# Patient Record
Sex: FEMALE | Race: WHITE | NOT HISPANIC OR LATINO | Employment: FULL TIME
[De-identification: names, ages, dates, MRNs, and addresses within clinical notes are randomized per-mention and may not be internally consistent; named-entity substitution may affect disease eponyms.]

---

## 2017-05-27 ENCOUNTER — HEALTH MAINTENANCE LETTER (OUTPATIENT)
Age: 32
End: 2017-05-27

## 2017-12-29 ENCOUNTER — TRANSFERRED RECORDS (OUTPATIENT)
Dept: MULTI SPECIALTY CLINIC | Facility: CLINIC | Age: 32
End: 2017-12-29

## 2017-12-29 LAB
HPV ABSTRACT: NORMAL
PAP-ABSTRACT: NORMAL

## 2019-09-28 ENCOUNTER — HEALTH MAINTENANCE LETTER (OUTPATIENT)
Age: 34
End: 2019-09-28

## 2020-02-26 ENCOUNTER — OFFICE VISIT (OUTPATIENT)
Dept: FAMILY MEDICINE | Facility: CLINIC | Age: 35
End: 2020-02-26
Payer: COMMERCIAL

## 2020-02-26 VITALS
SYSTOLIC BLOOD PRESSURE: 130 MMHG | WEIGHT: 186 LBS | RESPIRATION RATE: 14 BRPM | OXYGEN SATURATION: 97 % | TEMPERATURE: 98.7 F | DIASTOLIC BLOOD PRESSURE: 78 MMHG | BODY MASS INDEX: 25.76 KG/M2

## 2020-02-26 DIAGNOSIS — B02.9 HERPES ZOSTER WITHOUT COMPLICATION: Primary | ICD-10-CM

## 2020-02-26 PROCEDURE — 99203 OFFICE O/P NEW LOW 30 MIN: CPT | Performed by: FAMILY MEDICINE

## 2020-02-26 NOTE — PROGRESS NOTES
"Subjective     Coty Doyle is a 35 year old female who presents to clinic today for the following health issues:    HPI   Shingles evaluation           {additonal problems for provider to add (Optional):797743}    {HIST REVIEW/ LINKS 2 (Optional):149500}    {Additional problems for the provider to add (optional):527950}  Reviewed and updated as needed this visit by Provider         Review of Systems   {ROS COMP (Optional):802406}      Objective    There were no vitals taken for this visit.  There is no height or weight on file to calculate BMI.  Physical Exam   {Exam List (Optional):214007}    {Diagnostic Test Results (Optional):588475::\"Diagnostic Test Results:\",\"Labs reviewed in Epic\"}        {PROVIDER CHARTING PREFERENCE:439903}      "

## 2020-02-26 NOTE — PROGRESS NOTES
Subjective:   Coty Doyle is a 35 year old female who presents for   Chief Complaint   Patient presents with     Shingles     Was in [place] when this started.  had a rash coincidentally but that was diagnosed as staph.   This started as a painful rash a week ago then a rash showed up the following day. Saw at an urgent care the day following on Thursday. Small raised bumps have since lowered. Some color changes.     Present on right side and does wrap to the back. Would like exam to ensure this has improved.   Has taken all her doses of the valtrex (TID x 7 days 1 gram)     She had chills at times but no current fevers.     She did have some fatigue for first couple days. Also associated ear ache that is mild.       Patient Active Problem List    Diagnosis Date Noted     Plantar warts 07/06/2012     Priority: Medium     Contraceptive management 04/18/2012     Priority: Medium     CARDIOVASCULAR SCREENING; LDL GOAL LESS THAN 160 04/17/2012     Priority: Medium       Current Outpatient Medications   Medication     calcium carb 1250 mg, 500 mg [place],/vitamin D 200 units (OSCAL WITH D) 500-200 MG-UNIT per tablet     Cyanocobalamin (B-12) 1000 MCG TBCR     fish oil-omega-3 fatty acids (FISH OIL) 1000 MG capsule     Miconazole Nitrate 2 % GEL     Multiple Vitamins-Iron (MULTIVITAMIN/IRON PO)     norgestim-eth estrad triphasic (ORTHO TRI-CYCLEN, 28,) 0.18/0.215/0.25 MG-35 MCG TABS tablet     polyethylene glycol (MIRALAX) powder     VITAMIN E     No current facility-administered medications for this visit.      ROS:  As above per HPI    Objective:   /78   Temp 98.7  F (37.1  C)   Resp 14   Wt 84.4 kg (186 lb)   SpO2 97%   BMI 25.76 kg/m  , Body mass index is 25.76 kg/m .  Gen:  NAD, well-nourished, sitting in chair comfortably  HEENT: EOMI, sclera anicteric, Head normocephalic, ; nares patent; moist mucous membranes, normal TMs  Neck: trachea midline, no thyromegaly  CV:  Hemodynamically  stable  Pulm:  no increased work of breathing   ABD: soft, non-distended  Extrem: no cyanosis, edema or clubbing  Skin: rash of the right flank posterior and anterior - affecting T11-T12 level  Psych: Euthymic, linear thoughts, normal rate of speech    No results found for any visits on 02/26/20.             Assessment & Plan:   Coty Doyle, 35 year old female who presents with:  Herpes zoster without complication  Patient was encouraged to take her medication as prescribed, has missed doses on many days. She has no new pain/swelling/drainage. Most of the lesions have resolved but a few lesions seen < 0.5x 0.75cm that are still slightly elevated but are beginning to deflate. No superimposed infection believed at this time - she has concerns as prior to this rash starting her significant other developed a staph infection. Close monitoring of symptoms - return as needed if worsening.       Barrie Meyers MD   [facility]    The use of Dragon/GreenBytes dictation services may have been used to construct the content in this note; any grammatical or spelling errors are non-intentional. Please contact the author of this note directly if you are in need of any clarification.

## 2020-03-23 ENCOUNTER — TELEPHONE (OUTPATIENT)
Dept: FAMILY MEDICINE | Facility: CLINIC | Age: 35
End: 2020-03-23

## 2020-03-23 ENCOUNTER — VIRTUAL VISIT (OUTPATIENT)
Dept: FAMILY MEDICINE | Facility: CLINIC | Age: 35
End: 2020-03-23
Payer: COMMERCIAL

## 2020-03-23 DIAGNOSIS — J45.20 MILD INTERMITTENT ASTHMA WITHOUT COMPLICATION: Primary | ICD-10-CM

## 2020-03-23 PROCEDURE — 99441 ZZC PHYSICIAN TELEPHONE EVALUATION 5-10 MIN: CPT | Performed by: PHYSICIAN ASSISTANT

## 2020-03-23 RX ORDER — ALBUTEROL SULFATE 90 UG/1
2 AEROSOL, METERED RESPIRATORY (INHALATION) EVERY 6 HOURS PRN
Qty: 1 INHALER | Refills: 4 | Status: SHIPPED | OUTPATIENT
Start: 2020-03-23 | End: 2021-12-31

## 2020-03-23 NOTE — PROGRESS NOTES
"Coty Doyle is a 35 year old female who is being evaluated via a billable telephone visit.      The patient has been notified of following:     \"This telephone visit will be conducted via a call between you and your physician/provider. We have found that certain health care needs can be provided without the need for a physical exam.  This service lets us provide the care you need with a short phone conversation.  If a prescription is necessary we can send it directly to your pharmacy.  If lab work is needed we can place an order for that and you can then stop by our lab to have the test done at a later time.    If during the course of the call the physician/provider feels a telephone visit is not appropriate, you will not be charged for this service.\"     Coty Doyle complains of    Chief Complaint   Patient presents with     Asthma       I have reviewed and updated the patient's Past Medical History, Social History, Family History and Medication List.    ALLERGIES  Sulfa drugs     35 year old female with a history of asthma  Current albuterol inhaler is   Would like refills today  Using sparingly, primarily needs when cold air triggers cough/wheeze    Assessment/Plan:  1. Mild intermittent asthma without complication  - Well controlled at present. Using albuterol sparingly. Primarily triggered by cold air.   - albuterol (PROAIR HFA/PROVENTIL HFA/VENTOLIN HFA) 108 (90 Base) MCG/ACT inhaler; Inhale 2 puffs into the lungs every 6 hours as needed for shortness of breath / dyspnea or wheezing  Dispense: 1 Inhaler; Refill: 4    Phone call duration:  7 minutes    Case Patrick PA-C      "

## 2020-07-15 ENCOUNTER — OFFICE VISIT (OUTPATIENT)
Dept: FAMILY MEDICINE | Facility: CLINIC | Age: 35
End: 2020-07-15
Payer: COMMERCIAL

## 2020-07-15 VITALS
HEART RATE: 115 BPM | BODY MASS INDEX: 25.62 KG/M2 | RESPIRATION RATE: 18 BRPM | SYSTOLIC BLOOD PRESSURE: 110 MMHG | DIASTOLIC BLOOD PRESSURE: 79 MMHG | OXYGEN SATURATION: 98 % | WEIGHT: 183 LBS | HEIGHT: 71 IN | TEMPERATURE: 97.6 F

## 2020-07-15 DIAGNOSIS — J45.20 MILD INTERMITTENT ASTHMA WITHOUT COMPLICATION: ICD-10-CM

## 2020-07-15 DIAGNOSIS — Z00.00 ROUTINE GENERAL MEDICAL EXAMINATION AT A HEALTH CARE FACILITY: Primary | ICD-10-CM

## 2020-07-15 DIAGNOSIS — Z11.59 SCREENING FOR VIRAL DISEASE: ICD-10-CM

## 2020-07-15 DIAGNOSIS — Z13.29 SCREENING FOR THYROID DISORDER: ICD-10-CM

## 2020-07-15 DIAGNOSIS — E61.1 LOW IRON: ICD-10-CM

## 2020-07-15 DIAGNOSIS — Z13.1 SCREENING FOR DIABETES MELLITUS: ICD-10-CM

## 2020-07-15 DIAGNOSIS — K90.49 DAIRY PRODUCT INTOLERANCE: ICD-10-CM

## 2020-07-15 DIAGNOSIS — Z13.220 SCREENING FOR HYPERLIPIDEMIA: ICD-10-CM

## 2020-07-15 LAB
BASOPHILS # BLD AUTO: 0 10E9/L (ref 0–0.2)
BASOPHILS NFR BLD AUTO: 0.6 %
CHOLEST SERPL-MCNC: 202 MG/DL
DIFFERENTIAL METHOD BLD: NORMAL
EOSINOPHIL # BLD AUTO: 0.3 10E9/L (ref 0–0.7)
EOSINOPHIL NFR BLD AUTO: 4.6 %
ERYTHROCYTE [DISTWIDTH] IN BLOOD BY AUTOMATED COUNT: 13.5 % (ref 10–15)
GLUCOSE SERPL-MCNC: 95 MG/DL (ref 70–99)
HCT VFR BLD AUTO: 40.8 % (ref 35–47)
HDLC SERPL-MCNC: 52 MG/DL
HGB BLD-MCNC: 13.4 G/DL (ref 11.7–15.7)
IRON SATN MFR SERPL: 20 % (ref 15–46)
IRON SERPL-MCNC: 54 UG/DL (ref 35–180)
LDLC SERPL CALC-MCNC: 137 MG/DL
LYMPHOCYTES # BLD AUTO: 1.3 10E9/L (ref 0.8–5.3)
LYMPHOCYTES NFR BLD AUTO: 18.7 %
MCH RBC QN AUTO: 29.5 PG (ref 26.5–33)
MCHC RBC AUTO-ENTMCNC: 32.8 G/DL (ref 31.5–36.5)
MCV RBC AUTO: 90 FL (ref 78–100)
MONOCYTES # BLD AUTO: 0.5 10E9/L (ref 0–1.3)
MONOCYTES NFR BLD AUTO: 6.9 %
NEUTROPHILS # BLD AUTO: 4.8 10E9/L (ref 1.6–8.3)
NEUTROPHILS NFR BLD AUTO: 69.2 %
NONHDLC SERPL-MCNC: 150 MG/DL
PLATELET # BLD AUTO: 213 10E9/L (ref 150–450)
RBC # BLD AUTO: 4.54 10E12/L (ref 3.8–5.2)
TIBC SERPL-MCNC: 264 UG/DL (ref 240–430)
TRIGL SERPL-MCNC: 64 MG/DL
TSH SERPL DL<=0.005 MIU/L-ACNC: 2.05 MU/L (ref 0.4–4)
WBC # BLD AUTO: 6.9 10E9/L (ref 4–11)

## 2020-07-15 PROCEDURE — 99000 SPECIMEN HANDLING OFFICE-LAB: CPT | Performed by: PHYSICIAN ASSISTANT

## 2020-07-15 PROCEDURE — 36415 COLL VENOUS BLD VENIPUNCTURE: CPT | Performed by: PHYSICIAN ASSISTANT

## 2020-07-15 PROCEDURE — 83550 IRON BINDING TEST: CPT | Performed by: PHYSICIAN ASSISTANT

## 2020-07-15 PROCEDURE — 84443 ASSAY THYROID STIM HORMONE: CPT | Performed by: PHYSICIAN ASSISTANT

## 2020-07-15 PROCEDURE — 85025 COMPLETE CBC W/AUTO DIFF WBC: CPT | Performed by: PHYSICIAN ASSISTANT

## 2020-07-15 PROCEDURE — 99214 OFFICE O/P EST MOD 30 MIN: CPT | Mod: 25 | Performed by: PHYSICIAN ASSISTANT

## 2020-07-15 PROCEDURE — 82947 ASSAY GLUCOSE BLOOD QUANT: CPT | Performed by: PHYSICIAN ASSISTANT

## 2020-07-15 PROCEDURE — 83540 ASSAY OF IRON: CPT | Performed by: PHYSICIAN ASSISTANT

## 2020-07-15 PROCEDURE — 86769 SARS-COV-2 COVID-19 ANTIBODY: CPT | Mod: 90 | Performed by: PHYSICIAN ASSISTANT

## 2020-07-15 PROCEDURE — 80061 LIPID PANEL: CPT | Performed by: PHYSICIAN ASSISTANT

## 2020-07-15 PROCEDURE — 99395 PREV VISIT EST AGE 18-39: CPT | Performed by: PHYSICIAN ASSISTANT

## 2020-07-15 ASSESSMENT — MIFFLIN-ST. JEOR: SCORE: 1622.24

## 2020-07-15 NOTE — PROGRESS NOTES
SUBJECTIVE:   CC: Coty Blankenship Yanira is an 35 year old woman who presents for preventive health visit.     Healthy Habits:    Do you get at least three servings of calcium containing foods daily (dairy, green leafy vegetables, etc.)? yes    Amount of exercise or daily activities, outside of work: waling every other day     Problems taking medications regularly No    Medication side effects: No    Have you had an eye exam in the past two years? yes    Do you see a dentist twice per year? Yes, next appt is delay     Do you have sleep apnea, excessive snoring or daytime drowsiness?yes, snoring     Additional: thyroid  Testing, and vaccine. Had shingles 2018 and have couple scars from it. Allergy testing      Here for routine health physical.   Overall feeling good about health.  Staying healthy during pandemic. She and  working from home.   Stress has been higher but trying to remedy with wellness activities.   She has a history of asthma that is triggered by seasonal allergies -- managed with PRN albuterol.   She would like a referral to allergist for food allergy evaluation     She had  are planning to try to conceive first child   No history of abnormal pap. Last pap done in [facility] 12/29/17 results normal.  Menstrual periods regular. Last cycle heavy which she attributes due to stress.  Mother had no problems with pregnancies. Aunts have had some fertility issues.   There is a family history of thyroid dysfunction.    Would like COVID-19 antibody testing.       Today's PHQ-2 Score:   PHQ-2 ( 1999 Pfizer) 7/15/2020 7/6/2012   Q1: Little interest or pleasure in doing things 0 0   Q2: Feeling down, depressed or hopeless 0 0   PHQ-2 Score 0 0     Abuse: Current or Past(Physical, Sexual or Emotional)- No  Do you feel safe in your environment? Yes    Social History     Tobacco Use     Smoking status: Never Smoker     Smokeless tobacco: Never Used   Substance Use Topics     Alcohol use: Yes      "Comment: rarely     If you drink alcohol do you typically have >3 drinks per day or >7 drinks per week? No                     Reviewed orders with patient.  Reviewed health maintenance and updated orders accordingly - Yes        Pertinent mammograms are reviewed under the imaging tab.  History of abnormal Pap smear: NO - age 30-65 PAP every 5 years with negative HPV co-testing recommended  PAP / HPV 4/17/2012   PAP NIL     Reviewed and updated as needed this visit by clinical staff  Tobacco  Allergies  Meds  Med Hx  Surg Hx  Fam Hx  Soc Hx        Reviewed and updated as needed this visit by Provider            ROS:  CONSTITUTIONAL: NEGATIVE for fever, chills, change in weight  INTEGUMENTARU/SKIN: NEGATIVE for worrisome rashes, moles or lesions  EYES: NEGATIVE for vision changes or irritation  ENT: NEGATIVE for ear, mouth and throat problems  RESP: NEGATIVE for significant cough or SOB  BREAST: NEGATIVE for masses, tenderness or discharge  CV: NEGATIVE for chest pain, palpitations or peripheral edema  GI: NEGATIVE for nausea, abdominal pain, heartburn, or change in bowel habits  : NEGATIVE for unusual urinary or vaginal symptoms. Periods are regular.  MUSCULOSKELETAL: NEGATIVE for significant arthralgias or myalgia  NEURO: NEGATIVE for weakness, dizziness or paresthesias  PSYCHIATRIC: NEGATIVE for changes in mood or affect    OBJECTIVE:   /79 (BP Location: Left arm, Patient Position: Sitting, Cuff Size: Adult Regular)   Pulse 115   Temp 97.6  F (36.4  C) (Oral)   Resp 18   Ht 1.797 m (5' 10.75\")   Wt 83 kg (183 lb)   LMP 07/01/2020 (Approximate)   SpO2 98%   BMI 25.70 kg/m    EXAM:  GENERAL: healthy, alert and no distress  EYES: Eyes grossly normal to inspection, PERRL and conjunctivae and sclerae normal  HENT: ear canals and TM's normal, nose and mouth without ulcers or lesions  NECK: no adenopathy, no asymmetry, masses, or scars and thyroid normal to palpation  RESP: lungs clear to " auscultation - no rales, rhonchi or wheezes  BREAST: normal without masses, tenderness or nipple discharge and no palpable axillary masses or adenopathy  CV: regular rate and rhythm, normal S1 S2, no S3 or S4, no murmur, click or rub, no peripheral edema and peripheral pulses strong  ABDOMEN: soft, nontender, no hepatosplenomegaly, no masses and bowel sounds normal  MS: no gross musculoskeletal defects noted, no edema  SKIN: no suspicious lesions or rashes  NEURO: Normal strength and tone, mentation intact and speech normal  PSYCH: mentation appears normal, affect normal/bright    Diagnostic Test Results:  Labs reviewed in Epic  Results for orders placed or performed in visit on 07/15/20 (from the past 24 hour(s))   CBC with platelets and differential   Result Value Ref Range    WBC 6.9 4.0 - 11.0 10e9/L    RBC Count 4.54 3.8 - 5.2 10e12/L    Hemoglobin 13.4 11.7 - 15.7 g/dL    Hematocrit 40.8 35.0 - 47.0 %    MCV 90 78 - 100 fl    MCH 29.5 26.5 - 33.0 pg    MCHC 32.8 31.5 - 36.5 g/dL    RDW 13.5 10.0 - 15.0 %    Platelet Count 213 150 - 450 10e9/L    Diff Method Automated Method     % Neutrophils 69.2 %    % Lymphocytes 18.7 %    % Monocytes 6.9 %    % Eosinophils 4.6 %    % Basophils 0.6 %    Absolute Neutrophil 4.8 1.6 - 8.3 10e9/L    Absolute Lymphocytes 1.3 0.8 - 5.3 10e9/L    Absolute Monocytes 0.5 0.0 - 1.3 10e9/L    Absolute Eosinophils 0.3 0.0 - 0.7 10e9/L    Absolute Basophils 0.0 0.0 - 0.2 10e9/L   Iron and iron binding capacity   Result Value Ref Range    Iron 54 35 - 180 ug/dL    Iron Binding Cap 264 240 - 430 ug/dL    Iron Saturation Index 20 15 - 46 %   Glucose   Result Value Ref Range    Glucose 95 70 - 99 mg/dL       ASSESSMENT/PLAN:   (Z00.00) Routine general medical examination at a health care facility  (primary encounter diagnosis)  Comment: routine health physical today. Overall doing well. Family planning in process.  Plan: Routine labs today. Continue to get regular exercise and practice  "stress reduction.     (J45.20) Mild intermittent asthma without complication  Comment: Chronic, stable. Triggered by seasonal allergies or extreme temperature changes. Managed currently with albuterol.   Plan: ALLERGY/ASTHMA ADULT REFERRAL          (K90.9) Dairy product intolerance  Comment: Patient would like allergy referral for possible dairy intolerance evaluation  Plan: ALLERGY/ASTHMA ADULT REFERRAL          (Z13.29) Screening for thyroid disorder  Comment: Family history of thyroid dysfunction.   Plan: TSH with free T4 reflex          (Z11.59) Screening for viral disease  Comment:   Plan: COVID-19 Virus (Coronavirus) Antibody & Titer         Reflex          (E61.1) Low iron  Comment: history of low iron, recheck today  Plan: CBC with platelets and differential, Iron and         iron binding capacity          (Z13.220) Screening for hyperlipidemia  Comment: Routine screening  Plan: Lipid panel reflex to direct LDL Fasting          (Z13.1) Screening for diabetes mellitus  Comment: Routine screening   Plan: Glucose            COUNSELING:   Reviewed preventive health counseling, as reflected in patient instructions       Regular exercise       Healthy diet/nutrition       Family planning    Estimated body mass index is 25.7 kg/m  as calculated from the following:    Height as of this encounter: 1.797 m (5' 10.75\").    Weight as of this encounter: 83 kg (183 lb).         reports that she has never smoked. She has never used smokeless tobacco.    Counseling Resources:  ATP IV Guidelines  Pooled Cohorts Equation Calculator  Breast Cancer Risk Calculator  FRAX Risk Assessment  ICSI Preventive Guidelines  Dietary Guidelines for Americans, 2010  USDA's MyPlate  ASA Prophylaxis  Lung CA Screening    Case Patrick PA-C  [facility]  "

## 2020-07-16 LAB
COVID-19 SPIKE RBD ABY TITER: NORMAL
COVID-19 SPIKE RBD ABY: NEGATIVE

## 2020-09-03 ENCOUNTER — E-VISIT (OUTPATIENT)
Dept: FAMILY MEDICINE | Facility: CLINIC | Age: 35
End: 2020-09-03
Payer: COMMERCIAL

## 2020-09-03 DIAGNOSIS — N39.0 ACUTE UTI (URINARY TRACT INFECTION): Primary | ICD-10-CM

## 2020-09-03 PROCEDURE — 99421 OL DIG E/M SVC 5-10 MIN: CPT | Performed by: FAMILY MEDICINE

## 2020-09-04 DIAGNOSIS — N39.0 ACUTE UTI (URINARY TRACT INFECTION): ICD-10-CM

## 2020-09-04 LAB
ALBUMIN UR-MCNC: NEGATIVE MG/DL
APPEARANCE UR: NORMAL
BACTERIA #/AREA URNS HPF: ABNORMAL /HPF
BILIRUB UR QL STRIP: NEGATIVE
COLOR UR AUTO: YELLOW
GLUCOSE UR STRIP-MCNC: NEGATIVE MG/DL
HGB UR QL STRIP: NEGATIVE
KETONES UR STRIP-MCNC: NEGATIVE MG/DL
LEUKOCYTE ESTERASE UR QL STRIP: NEGATIVE
NITRATE UR QL: NEGATIVE
NON-SQ EPI CELLS #/AREA URNS LPF: ABNORMAL /LPF
PH UR STRIP: 5.5 PH (ref 5–7)
RBC #/AREA URNS AUTO: ABNORMAL /HPF
SOURCE: NORMAL
SP GR UR STRIP: 1.02 (ref 1–1.03)
UROBILINOGEN UR STRIP-ACNC: 0.2 EU/DL (ref 0.2–1)
WBC #/AREA URNS AUTO: ABNORMAL /HPF

## 2020-09-04 PROCEDURE — 81001 URINALYSIS AUTO W/SCOPE: CPT | Performed by: FAMILY MEDICINE

## 2020-09-04 RX ORDER — NITROFURANTOIN 25; 75 MG/1; MG/1
100 CAPSULE ORAL 2 TIMES DAILY
Qty: 14 CAPSULE | Refills: 0 | Status: SHIPPED | OUTPATIENT
Start: 2020-09-04 | End: 2021-12-30

## 2020-09-04 NOTE — PATIENT INSTRUCTIONS
Thank you for choosing us for your care. I have placed an order for a prescription so that you can start treatment. View your full visit summary for details by clicking on the link below. Your pharmacist will able to address any questions you may have about the medication.     If you re not feeling better within 2-3 days, please schedule an appointment.  You can schedule an appointment right here in [employer], or call [phone]  If the visit is for the same symptoms as your e-visit, we ll refund the cost of your e-visit if seen within seven days.    Thank you for choosing us for your care. Given your symptoms, I would like you to do a lab-only visit to determine what is causing them.  I have placed the orders.  Please schedule an appointment with the lab right here in [employer], or call [phone].  I will let you know when the results are back and next steps to take.    Urinary Tract Infections in Women    Urinary tract infections (UTIs) are most often caused by bacteria. These bacteria enter the urinary tract. The bacteria may come from outside the body. Or they may travel from the skin outside the rectum or vagina into the urethra. Female anatomy makes it easy for bacteria from the bowel to enter a woman s urinary tract, which is the most common source of UTI. This means women develop UTIs more often than men. Pain in or around the urinary tract is a common UTI symptom. But the only way to know for sure if you have a UTI for the healthcare provider to test your urine. The two tests that may be done are the urinalysis and urine culture.  Types of UTIs    Cystitis. A bladder infection (cystitis) is the most common UTI in women. You may have urgent or frequent urination. You may also have pain, burning when you urinate, and bloody urine.    Urethritis. This is an inflamed urethra, which is the tube that carries urine from the bladder to outside the body. You may have lower stomach or back pain. You may also have  urgent or frequent urination.    Pyelonephritis. This is a kidney infection. If not treated, it can be serious and damage your kidneys. In severe cases, you may need to stay in the hospital. You may have a fever and lower back pain.  Medicines to treat a UTI  Most UTIs are treated with antibiotics. These kill the bacteria. The length of time you need to take them depends on the type of infection. It may be as short as 3 days. If you have repeated UTIs, you may need a low-dose antibiotic for several months. Take antibiotics exactly as directed. Don t stop taking them until all of the medicine is gone. If you stop taking the antibiotic too soon, the infection may not go away. You may also develop a resistance to the antibiotic. This can make it much harder to treat.  Lifestyle changes to treat and prevent UTIs  The lifestyle changes below will help get rid of your UTI. They may also help prevent future UTIs.    Drink plenty of fluids. This includes water, juice, or other caffeine-free drinks. Fluids help flush bacteria out of your body.    Empty your bladder. Always empty your bladder when you feel the urge to urinate. And always urinate before going to sleep. Urine that stays in your bladder can lead to infection. Try to urinate before and after sex as well.    Practice good personal hygiene. Wipe yourself from front to back after using the toilet. This helps keep bacteria from getting into the urethra.    Use condoms during sex. These help prevent UTIs caused by sexually transmitted bacteria. Also don't use spermicides during sex. These can increase the risk for UTIs. Choose other forms of birth control instead. For women who tend to get UTIs after sex, a low-dose of a preventive antibiotic may be used. Be sure to discuss this option with your healthcare provider.    Follow up with your healthcare provider as directed. He or she may test to make sure the infection has cleared. If needed, more treatment may be  started.  Date Last Reviewed: 1/1/2017 2000-2019 [employer]. [address] [place] [address], [place] [address]. All rights reserved. This information is not intended as a substitute for professional medical care. Always follow your healthcare professional's instructions.

## 2020-11-30 ENCOUNTER — E-VISIT (OUTPATIENT)
Dept: URGENT CARE | Facility: URGENT CARE | Age: 35
End: 2020-11-30
Payer: COMMERCIAL

## 2020-11-30 DIAGNOSIS — Z20.822 SUSPECTED COVID-19 VIRUS INFECTION: Primary | ICD-10-CM

## 2020-11-30 PROCEDURE — 99421 OL DIG E/M SVC 5-10 MIN: CPT | Performed by: PHYSICIAN ASSISTANT

## 2020-12-01 NOTE — PATIENT INSTRUCTIONS
Dear Coty Doyle,    Your symptoms show that you may have coronavirus (COVID-19). This illness can cause fever, cough and trouble breathing. Many people get a mild case and get better on their own. Some people can get very sick.    Will I be tested for COVID-19?  We would like to test you for Covid-19 virus. I have placed orders for this test.   To schedule: go to your [employer] home page and scroll down to the section that says  You have an appointment that needs to be scheduled  and click the large green button that says  Schedule Now  and follow the steps to find the next available openings.    If you are unable to complete these [employer] scheduling steps, please call [phone] to schedule your testing.     When it's time for your COVID test:  Stay at least 6 feet away from others. (If someone will drive you to your test, stay in the backseat, as far away from the  as you can.)  Cover your mouth and nose with a mask, tissue or washcloth.  Go straight to the testing site. Don't make any stops on the way there or back.    Starting now:     Do not go to work. Follow your usual processes for taking time away from work.  o If you receive a negative COVID-19 test result and were NOT exposed to someone with a known positive COVID-19 test, you can return to work once you're free of fever for 24 hours without fever-reducing medication and your symptoms are improving or resolved.  o If you receive a positive COVID-19 test result, return to work should be at least 10 days from symptom onset (20 days if people have immune compromise) and people should be fever-free for 24 hours without medications, and the respiratory symptoms should be improved significantly before returning to work or school  o If you were exposed to someone who has tested positive for COVID-19, you can return to work 14 days after your last contact with the positive individual, provided you do not have symptoms at all during that  "time.    During this time, don't leave the house except for testing or medical care.  o Stay in your own room, even for meals. Use your own bathroom if you can.  o Stay away from others in your home. No hugging, kissing or shaking hands. No visitors.  o Don't go to work, school or anywhere else.    Clean \"high touch\" surfaces often (doorknobs, counters, handles, etc.). Use a household cleaning spray or wipes. You'll find a full list of  on the EPA website: www.epa.gov/pesticide-registration/list-n-disinfectants-use-against-sars-cov-2.    Cover your mouth and nose with a mask, tissue or washcloth to avoid spreading germs.    Wash your hands and face often. Use soap and water.    People in these groups are at risk for severe illness due to COVID-19:  o People 65 years and older  o People who live in a nursing home or long-term care facility  o People with chronic disease (lung, heart, cancer, diabetes, kidney, liver, immunologic)  o People who have a weakened immune system, including those who:  - Are in cancer treatment  - Take medicine that weakens the immune system, such as corticosteroids  - Had a bone marrow or organ transplant  - Have an immune deficiency  - Have poorly controlled HIV or AIDS  - Are obese (body mass index of 40 or higher)  - Smoke regularly      Caregivers should wear gloves while washing dishes, handling laundry and cleaning bedrooms and bathrooms.    Use caution when washing and drying laundry: Don't shake dirty laundry, and use the warmest water setting that you can.    For more tips, go to www.cdc.gov/coronavirus/2019-ncov/downloads/10Things.pdf.    Sign up for [employer]. We know it's scary to hear that you might have COVID-19. We want to track your symptoms to make sure you're okay over the next 2 weeks. Please look for an email from [facility]--this is a free, online program that we'll use to keep in touch. To sign up, follow the link in the email you will receive. Learn more " at [email or web address]    How can I take care of myself?    Get lots of rest. Drink extra fluids (unless a doctor has told you not to)    Take Tylenol (acetaminophen) for fever or pain. If you have liver or kidney problems, ask your family doctor if it's okay to take Tylenol.  Adults can take either:    650 mg (two 325 mg pills) every 4 to 6 hours, or     1,000 mg (two 500 mg pills) every 8 hours as needed.    Note: Don't take more than 3,000 mg in one day. Acetaminophen is found in many medicines (both prescribed and over-the-counter medicines). Read all labels to be sure you don't take too much.  For children, check the Tylenol bottle for the right dose. The dose is based on the child's age or weight.    If you have other health problems (like cancer, heart failure, an organ transplant or severe kidney disease): Call your specialty clinic if you don't feel better in the next 2 days.    Know when to call 911. Emergency warning signs include:  Trouble breathing or shortness of breath  Pain or pressure in the chest that doesn't go away  Feeling confused like you haven't felt before, or not being able to wake up  Bluish-colored lips or face    Where can I get more information?    [facility] - About COVID-19: [email or web address]  CDC - What to Do If You're Sick: www.cdc.gov/coronavirus/2019-ncov/about/steps-when-sick.html

## 2020-12-09 ENCOUNTER — MYC MEDICAL ADVICE (OUTPATIENT)
Dept: FAMILY MEDICINE | Facility: CLINIC | Age: 35
End: 2020-12-09

## 2020-12-09 ENCOUNTER — E-VISIT (OUTPATIENT)
Dept: FAMILY MEDICINE | Facility: CLINIC | Age: 35
End: 2020-12-09
Payer: COMMERCIAL

## 2020-12-09 DIAGNOSIS — H01.139 ECZEMATOUS DERMATITIS OF EYELID, UNSPECIFIED LATERALITY: Primary | ICD-10-CM

## 2020-12-09 DIAGNOSIS — H01.133 ECZEMA OF EYELID, RIGHT: Primary | ICD-10-CM

## 2020-12-09 PROCEDURE — 99421 OL DIG E/M SVC 5-10 MIN: CPT | Performed by: PHYSICIAN ASSISTANT

## 2020-12-09 NOTE — TELEPHONE ENCOUNTER
Case/Covering provider-Please review and advise.  Patient attached photo.  1. Given area of concern involves skin surrounding eye, should patient pursue evaluation with Dermatology and/or Ophthalmology?  Referrals pended  2. Any over-the-counter options in the meantime?    Thank you!  MADDIE RoseN, RN

## 2021-01-10 ENCOUNTER — HEALTH MAINTENANCE LETTER (OUTPATIENT)
Age: 36
End: 2021-01-10

## 2021-06-14 ENCOUNTER — VIRTUAL VISIT (OUTPATIENT)
Dept: FAMILY MEDICINE | Facility: CLINIC | Age: 36
End: 2021-06-14
Payer: COMMERCIAL

## 2021-06-14 DIAGNOSIS — F43.23 ADJUSTMENT REACTION WITH ANXIETY AND DEPRESSION: Primary | ICD-10-CM

## 2021-06-14 PROCEDURE — 99214 OFFICE O/P EST MOD 30 MIN: CPT | Mod: 95 | Performed by: PHYSICIAN ASSISTANT

## 2021-06-14 RX ORDER — LORAZEPAM 0.5 MG/1
0.5 TABLET ORAL EVERY 6 HOURS PRN
Qty: 10 TABLET | Refills: 0 | Status: SHIPPED | OUTPATIENT
Start: 2021-06-14 | End: 2021-12-30

## 2021-06-14 NOTE — PROGRESS NOTES
Coty is a 36 year old who is being evaluated via a billable telephone visit.      What phone number would you like to be contacted at? [phone]   How would you like to [facility] your [facility]? [facility]    Assessment & Plan     Adjustment reaction with anxiety and depression  Coty is a pleasant 36 year old female with a history of BALBINA diagnosed 6 years ago who is presenting via telephone to discuss mental health concerns. Specifically her family recently held an intervention expressing their concern for her mental health and her distancing from them during the pandemic. Coty relates this was largely due to trying to prevent spread of COVID amongst family members, especially those who are elderly. She does follow with a therapist who has been monitoring her PHQ weekly which has been at times in the mild to moderate depression range. No SI. Symptoms were worse over winter months, thinks there is seasonal component, however did not think of herself as depressed. Did adopt a puppy which has been helpful. She works full time and has been productive at work. No issues with motivation or staying on task. She has good support at home from her . Generally has been able to regulate mood but lately emotions have been more unstable. Does follow with therapist, acupuncture, meditation, yoga, and planning CBT-I workshop. Today requesting referral to psychiatry. Also interested in Genesight testing. Not wanting to start a daily medication right now but might consider in the future. We agreed to try PRN ativan for severe anxiety. Coty is hoping to get some natural/herbal recs from outside provider and will plan to follow up with me in July for routine physical at which time we will discuss progress, Genesight, and ongoing symptoms. She will schedule with psychiatry but anticipates there will be a delay in getting in with them. Return to care sooner if any new or worsening symptoms.   - MENTAL HEALTH REFERRAL  - Adult; Psychiatry;  Psychiatry; [facility]: Collaborative Care Psychiatry Service/Bridge to Long-Term Psychiatry as indicated ([phone]); Yes; Chronic Mental Health without improvement; Yes; We will contact you to schedule ...  - LORazepam (ATIVAN) 0.5 MG tablet; Take 1 tablet (0.5 mg) by mouth every 6 hours as needed for anxiety    35 minutes spent on the date of the encounter doing chart review, history and exam, documentation and further activities per the note    Return in about 4 weeks (around 7/12/2021) for Physical Exam, Routine Visit.    Case Patrick PA-C  [facility]    Alex Ansari is a 36 year old who presents for the following health issues     HPI     Depression and Anxiety Follow-Up    How are you doing with your depression since your last visit? Worsened     How are you doing with your anxiety since your last visit?  Worsened     Are you having other symptoms that might be associated with depression or anxiety? Yes:  family worried more withdrawn    Have you had a significant life event? No     Do you have any concerns with your use of alcohol or other drugs? No     Social History     Tobacco Use     Smoking status: Never Smoker     Smokeless tobacco: Never Used   Substance Use Topics     Alcohol use: Yes     Comment: rarely     Drug use: No     PHQ 7/16/2020   PHQ-9 Total Score 2   Q9: Thoughts of better off dead/self-harm past 2 weeks Not at all     No flowsheet data found.  Last PHQ-9 7/16/2020   1.  Little interest or pleasure in doing things 0   2.  Feeling down, depressed, or hopeless 0   3.  Trouble falling or staying asleep, or sleeping too much 1   4.  Feeling tired or having little energy 0   5.  Poor appetite or overeating 1   6.  Feeling bad about yourself 0   7.  Trouble concentrating 0   8.  Moving slowly or restless 0   Q9: Thoughts of better off dead/self-harm past 2 weeks 0   PHQ-9 Total Score 2     Suicide Assessment Five-step Evaluation and Treatment (SAFE-T)    How many  servings of fruits and vegetables do you eat daily?  2-3    On average, how many sweetened beverages do you drink each day (Examples: soda, juice, sweet tea, etc.  Do NOT count diet or artificially sweetened beverages)?   0    How many days per week do you exercise enough to make your heart beat faster? 3 or less    How many minutes a day do you exercise enough to make your heart beat faster? 20 - 29    How many days per week do you miss taking your medication? 0      Review of Systems   Constitutional, HEENT, cardiovascular, pulmonary, gi and gu systems are negative, except as otherwise noted.      Objective           Vitals:  No vitals were obtained today due to virtual visit.    Physical Exam   healthy, alert and no distress  PSYCH: Alert and oriented times 3; coherent speech, normal   rate and volume, able to articulate logical thoughts, able   to abstract reason, no tangential thoughts, no hallucinations   or delusions  Her affect is normal and pleasant  RESP: No cough, no audible wheezing, able to talk in full sentences  Remainder of exam unable to be completed due to telephone visits            Phone call duration: 30 minutes

## 2021-08-28 ENCOUNTER — HEALTH MAINTENANCE LETTER (OUTPATIENT)
Age: 36
End: 2021-08-28

## 2021-10-23 ENCOUNTER — HEALTH MAINTENANCE LETTER (OUTPATIENT)
Age: 36
End: 2021-10-23

## 2021-12-26 ENCOUNTER — E-VISIT (OUTPATIENT)
Dept: URGENT CARE | Facility: URGENT CARE | Age: 36
End: 2021-12-26
Payer: COMMERCIAL

## 2021-12-26 DIAGNOSIS — N39.0 ACUTE UTI (URINARY TRACT INFECTION): Primary | ICD-10-CM

## 2021-12-26 PROCEDURE — 99421 OL DIG E/M SVC 5-10 MIN: CPT | Performed by: PHYSICIAN ASSISTANT

## 2021-12-26 RX ORDER — NITROFURANTOIN 25; 75 MG/1; MG/1
100 CAPSULE ORAL 2 TIMES DAILY
Qty: 10 CAPSULE | Refills: 0 | Status: SHIPPED | OUTPATIENT
Start: 2021-12-26 | End: 2021-12-31

## 2021-12-26 ASSESSMENT — ENCOUNTER SYMPTOMS
ARTHRALGIAS: 1
FREQUENCY: 1
BREAST MASS: 0
HEADACHES: 1
NERVOUS/ANXIOUS: 1

## 2021-12-26 NOTE — PATIENT INSTRUCTIONS
Dear Coty Doyle    After reviewing your responses, I've been able to diagnose you with a urinary tract infection, which is a common infection of the bladder with bacteria.  This is not a sexually transmitted infection, though urinating immediately after intercourse can help prevent infections.  Drinking lots of fluids is also helpful to clear your current infection and prevent the next one.      I have sent a prescription for antibiotics to your pharmacy to treat this infection.    It is important that you take all of your prescribed medication even if your symptoms are improving after a few doses.  Taking all of your medicine helps prevent the symptoms from returning.     If your symptoms worsen, you develop pain in your back or stomach, develop fevers, or are not improving in 5 days, please contact your primary care provider for an appointment or visit any of our convenient Walk-in or Urgent Care Centers to be seen, which can be found on our website here.    Thanks again for choosing us as your health care partner,    Case Patrick PA-C    Urinary Tract Infections in Women  Urinary tract infections (UTIs) are most often caused by bacteria. These bacteria enter the urinary tract. The bacteria may come from inside the body. Or they may travel from the skin outside the rectum or vagina into the urethra. Female anatomy makes it easy for bacteria from the bowel to enter a woman s urinary tract, which is the most common source of UTI. This means women develop UTIs more often than men. Pain in or around the urinary tract is a common UTI symptom. But the only way to know for sure if you have a UTI for the healthcare provider to test your urine. The two tests that may be done are the urinalysis and urine culture.     Types of UTIs    Cystitis. A bladder infection (cystitis) is the most common UTI in women. You may have urgent or frequent need to pee. You may also have pain, burning when you pee, and bloody  urine.    Urethritis. This is an inflamed urethra, which is the tube that carries urine from the bladder to outside the body. You may have lower stomach or back pain. You may also have urgent or frequent need to pee.    Pyelonephritis. This is a kidney infection. If not treated, it can be serious and damage your kidneys. In severe cases, you may need to stay in the hospital. You may have a fever and lower back pain.    Medicines to treat a UTI  Most UTIs are treated with antibiotics. These kill the bacteria. The length of time you need to take them depends on the type of infection. It may be as short as 3 days. If you have repeated UTIs, you may need a low-dose antibiotic for several months. Take antibiotics exactly as directed. Don t stop taking them until all of the medicine is gone. If you stop taking the antibiotic too soon, the infection may not go away. You may also develop a resistance to the antibiotic. This can make it much harder to treat.   Lifestyle changes to treat and prevent UTIs   The lifestyle changes below will help get rid of your UTI. They may also help prevent future UTIs.     Drink plenty of fluids. This includes water, juice, or other caffeine-free drinks. Fluids help flush bacteria out of your body.    Empty your bladder. Always empty your bladder when you feel the urge to pee. And always pee before going to sleep. Urine that stays in your bladder can lead to infection. Try to pee before and after sex as well.    Practice good personal hygiene. Wipe yourself from front to back after using the toilet. This helps keep bacteria from getting into the urethra.    Use condoms during sex. These help prevent UTIs caused by sexually transmitted bacteria. Also don't use spermicides during sex. These can increase the risk for UTIs. Choose other forms of birth control instead. For women who tend to get UTIs after sex, a low-dose of a preventive antibiotic may be used. Be sure to discuss this option with  your healthcare provider.    Follow up with your healthcare provider as directed. He or she may test to make sure the infection has cleared. If needed, more treatment may be started.  Anny last reviewed this educational content on 7/1/2019 2000-2021 [employer]. All rights reserved. This information is not intended as a substitute for professional medical care. Always follow your healthcare professional's instructions.

## 2021-12-31 ENCOUNTER — MYC MEDICAL ADVICE (OUTPATIENT)
Dept: FAMILY MEDICINE | Facility: CLINIC | Age: 36
End: 2021-12-31

## 2021-12-31 ENCOUNTER — OFFICE VISIT (OUTPATIENT)
Dept: FAMILY MEDICINE | Facility: CLINIC | Age: 36
End: 2021-12-31
Payer: COMMERCIAL

## 2021-12-31 VITALS
HEART RATE: 115 BPM | TEMPERATURE: 97.3 F | SYSTOLIC BLOOD PRESSURE: 122 MMHG | RESPIRATION RATE: 16 BRPM | BODY MASS INDEX: 28.84 KG/M2 | OXYGEN SATURATION: 99 % | WEIGHT: 206 LBS | DIASTOLIC BLOOD PRESSURE: 84 MMHG | HEIGHT: 71 IN

## 2021-12-31 DIAGNOSIS — Z23 HIGH PRIORITY FOR COVID-19 VACCINATION: ICD-10-CM

## 2021-12-31 DIAGNOSIS — G44.219 EPISODIC TENSION-TYPE HEADACHE, NOT INTRACTABLE: ICD-10-CM

## 2021-12-31 DIAGNOSIS — Z13.0 SCREENING, ANEMIA, DEFICIENCY, IRON: ICD-10-CM

## 2021-12-31 DIAGNOSIS — Z13.6 ENCOUNTER FOR LIPID SCREENING FOR CARDIOVASCULAR DISEASE: ICD-10-CM

## 2021-12-31 DIAGNOSIS — Z23 NEED FOR 23-POLYVALENT PNEUMOCOCCAL POLYSACCHARIDE VACCINE: ICD-10-CM

## 2021-12-31 DIAGNOSIS — Z11.4 SCREENING FOR HIV (HUMAN IMMUNODEFICIENCY VIRUS): ICD-10-CM

## 2021-12-31 DIAGNOSIS — Z13.220 ENCOUNTER FOR LIPID SCREENING FOR CARDIOVASCULAR DISEASE: ICD-10-CM

## 2021-12-31 DIAGNOSIS — M26.609 TMJ (TEMPOROMANDIBULAR JOINT SYNDROME): ICD-10-CM

## 2021-12-31 DIAGNOSIS — R06.83 SNORING: ICD-10-CM

## 2021-12-31 DIAGNOSIS — J45.20 MILD INTERMITTENT ASTHMA WITHOUT COMPLICATION: ICD-10-CM

## 2021-12-31 DIAGNOSIS — Z80.0 FAMILY HISTORY OF COLON CANCER: ICD-10-CM

## 2021-12-31 DIAGNOSIS — Z00.00 ROUTINE HISTORY AND PHYSICAL EXAMINATION OF ADULT: Primary | ICD-10-CM

## 2021-12-31 DIAGNOSIS — Z00.00 HEALTH CARE MAINTENANCE: ICD-10-CM

## 2021-12-31 DIAGNOSIS — Z80.8 FAMILY HISTORY OF MELANOMA: ICD-10-CM

## 2021-12-31 DIAGNOSIS — D22.9 MULTIPLE PIGMENTED NEVI: ICD-10-CM

## 2021-12-31 DIAGNOSIS — Z23 NEED FOR PROPHYLACTIC VACCINATION AND INOCULATION AGAINST INFLUENZA: ICD-10-CM

## 2021-12-31 DIAGNOSIS — Z87.898 HISTORY OF DYSURIA: ICD-10-CM

## 2021-12-31 DIAGNOSIS — L30.9 ECZEMA, UNSPECIFIED TYPE: ICD-10-CM

## 2021-12-31 DIAGNOSIS — Z11.59 NEED FOR HEPATITIS C SCREENING TEST: ICD-10-CM

## 2021-12-31 DIAGNOSIS — R35.0 URINARY FREQUENCY: ICD-10-CM

## 2021-12-31 DIAGNOSIS — R09.81 NASAL CONGESTION: ICD-10-CM

## 2021-12-31 DIAGNOSIS — N89.8 VAGINAL DISCHARGE: ICD-10-CM

## 2021-12-31 DIAGNOSIS — F41.9 ANXIETY: ICD-10-CM

## 2021-12-31 DIAGNOSIS — R39.15 URINARY URGENCY: ICD-10-CM

## 2021-12-31 PROBLEM — R68.84 JAW PAIN: Status: ACTIVE | Noted: 2021-12-31

## 2021-12-31 PROBLEM — R68.84 JAW PAIN: Status: RESOLVED | Noted: 2021-12-31 | Resolved: 2021-12-31

## 2021-12-31 LAB
ALBUMIN UR-MCNC: NEGATIVE MG/DL
APPEARANCE UR: CLEAR
BACTERIA #/AREA URNS HPF: ABNORMAL /HPF
BASOPHILS # BLD AUTO: 0.1 10E3/UL (ref 0–0.2)
BASOPHILS NFR BLD AUTO: 1 %
BILIRUB UR QL STRIP: NEGATIVE
CLUE CELLS: NORMAL
COLOR UR AUTO: YELLOW
EOSINOPHIL # BLD AUTO: 0.3 10E3/UL (ref 0–0.7)
EOSINOPHIL NFR BLD AUTO: 3 %
ERYTHROCYTE [DISTWIDTH] IN BLOOD BY AUTOMATED COUNT: 13.6 % (ref 10–15)
GLUCOSE UR STRIP-MCNC: NEGATIVE MG/DL
HCT VFR BLD AUTO: 39.9 % (ref 35–47)
HGB BLD-MCNC: 13.2 G/DL (ref 11.7–15.7)
HGB UR QL STRIP: NEGATIVE
IMM GRANULOCYTES # BLD: 0 10E3/UL
IMM GRANULOCYTES NFR BLD: 0 %
KETONES UR STRIP-MCNC: ABNORMAL MG/DL
LEUKOCYTE ESTERASE UR QL STRIP: ABNORMAL
LYMPHOCYTES # BLD AUTO: 1.7 10E3/UL (ref 0.8–5.3)
LYMPHOCYTES NFR BLD AUTO: 18 %
MCH RBC QN AUTO: 28.7 PG (ref 26.5–33)
MCHC RBC AUTO-ENTMCNC: 33.1 G/DL (ref 31.5–36.5)
MCV RBC AUTO: 87 FL (ref 78–100)
MONOCYTES # BLD AUTO: 0.6 10E3/UL (ref 0–1.3)
MONOCYTES NFR BLD AUTO: 7 %
MUCOUS THREADS #/AREA URNS LPF: PRESENT /LPF
NEUTROPHILS # BLD AUTO: 6.7 10E3/UL (ref 1.6–8.3)
NEUTROPHILS NFR BLD AUTO: 72 %
NITRATE UR QL: NEGATIVE
PH UR STRIP: 5.5 [PH] (ref 5–7)
PLATELET # BLD AUTO: 213 10E3/UL (ref 150–450)
RBC # BLD AUTO: 4.6 10E6/UL (ref 3.8–5.2)
RBC #/AREA URNS AUTO: ABNORMAL /HPF
SP GR UR STRIP: >=1.03 (ref 1–1.03)
SQUAMOUS #/AREA URNS AUTO: ABNORMAL /LPF
TRICHOMONAS, WET PREP: NORMAL
UROBILINOGEN UR STRIP-ACNC: 0.2 E.U./DL
WBC # BLD AUTO: 9.4 10E3/UL (ref 4–11)
WBC #/AREA URNS AUTO: ABNORMAL /HPF
WBC'S/HIGH POWER FIELD, WET PREP: NORMAL
YEAST, WET PREP: NORMAL

## 2021-12-31 PROCEDURE — 81001 URINALYSIS AUTO W/SCOPE: CPT | Performed by: FAMILY MEDICINE

## 2021-12-31 PROCEDURE — 87210 SMEAR WET MOUNT SALINE/INK: CPT | Performed by: FAMILY MEDICINE

## 2021-12-31 PROCEDURE — 36415 COLL VENOUS BLD VENIPUNCTURE: CPT | Performed by: FAMILY MEDICINE

## 2021-12-31 PROCEDURE — 86803 HEPATITIS C AB TEST: CPT | Performed by: FAMILY MEDICINE

## 2021-12-31 PROCEDURE — 87086 URINE CULTURE/COLONY COUNT: CPT | Performed by: FAMILY MEDICINE

## 2021-12-31 PROCEDURE — 96127 BRIEF EMOTIONAL/BEHAV ASSMT: CPT | Mod: 59 | Performed by: FAMILY MEDICINE

## 2021-12-31 PROCEDURE — 80050 GENERAL HEALTH PANEL: CPT | Performed by: FAMILY MEDICINE

## 2021-12-31 PROCEDURE — 80061 LIPID PANEL: CPT | Performed by: FAMILY MEDICINE

## 2021-12-31 PROCEDURE — 99395 PREV VISIT EST AGE 18-39: CPT | Performed by: FAMILY MEDICINE

## 2021-12-31 PROCEDURE — 99214 OFFICE O/P EST MOD 30 MIN: CPT | Mod: 25 | Performed by: FAMILY MEDICINE

## 2021-12-31 PROCEDURE — 87389 HIV-1 AG W/HIV-1&-2 AB AG IA: CPT | Performed by: FAMILY MEDICINE

## 2021-12-31 RX ORDER — CHLORAL HYDRATE 500 MG
2 CAPSULE ORAL DAILY
COMMUNITY
End: 2022-12-12

## 2021-12-31 RX ORDER — EAR PLUGS
EACH OTIC (EAR)
COMMUNITY
End: 2022-09-23

## 2021-12-31 RX ORDER — CALCIUM CARBONATE/VITAMIN D3 500-10/5ML
LIQUID (ML) ORAL
COMMUNITY
End: 2022-09-23

## 2021-12-31 RX ORDER — ALBUTEROL SULFATE 90 UG/1
2 AEROSOL, METERED RESPIRATORY (INHALATION) EVERY 6 HOURS PRN
Qty: 18 G | Refills: 1 | Status: SHIPPED | OUTPATIENT
Start: 2021-12-31 | End: 2022-12-12

## 2021-12-31 ASSESSMENT — ANXIETY QUESTIONNAIRES
GAD7 TOTAL SCORE: 4
6. BECOMING EASILY ANNOYED OR IRRITABLE: NOT AT ALL
4. TROUBLE RELAXING: NOT AT ALL
2. NOT BEING ABLE TO STOP OR CONTROL WORRYING: SEVERAL DAYS
GAD7 TOTAL SCORE: 4
7. FEELING AFRAID AS IF SOMETHING AWFUL MIGHT HAPPEN: NOT AT ALL
5. BEING SO RESTLESS THAT IT IS HARD TO SIT STILL: SEVERAL DAYS
3. WORRYING TOO MUCH ABOUT DIFFERENT THINGS: SEVERAL DAYS
7. FEELING AFRAID AS IF SOMETHING AWFUL MIGHT HAPPEN: NOT AT ALL
1. FEELING NERVOUS, ANXIOUS, OR ON EDGE: SEVERAL DAYS
GAD7 TOTAL SCORE: 4

## 2021-12-31 ASSESSMENT — PATIENT HEALTH QUESTIONNAIRE - PHQ9
SUM OF ALL RESPONSES TO PHQ QUESTIONS 1-9: 2
SUM OF ALL RESPONSES TO PHQ QUESTIONS 1-9: 2

## 2021-12-31 ASSESSMENT — ENCOUNTER SYMPTOMS
FREQUENCY: 1
NERVOUS/ANXIOUS: 1
BREAST MASS: 0
ARTHRALGIAS: 1
HEADACHES: 1

## 2021-12-31 ASSESSMENT — MIFFLIN-ST. JEOR: SCORE: 1721.57

## 2021-12-31 NOTE — PROGRESS NOTES
SUBJECTIVE:   CC: Coty LEE Pattie Doyle is an 36 year old woman who presents for preventive health visit.     Healthy Habits:     Getting at least 3 servings of Calcium per day:  Yes    Bi-annual eye exam:  Yes    Dental care twice a year:  Yes    Sleep apnea or symptoms of sleep apnea:  None    Diet:  Gluten-free/reduced and Other    Frequency of exercise:  2-3 days/week    Duration of exercise:  15-30 minutes    Taking medications regularly:  Yes    Medication side effects:  Not applicable    PHQ-2 Total Score: 1    Additional concerns today:  Yes  Answers for HPI/ROS submitted by the patient on 12/31/2021  PHQ9 TOTAL SCORE: 2  BALBINA 7 TOTAL SCORE: 4    36 yr old , with Hx of situational anxiety, given benzo in 6/2021 but never filled, mild intermittent asthma on albuterol prn, hx of TMJ, prior Mandibular surgery x 2 as an 19 yr old, hx of plantar warts in 2012, prior contraception, PE tubes, on Mv, B 12, fish oil, zinc, Calm 50 mg supp and herbs by her herbalist/ naturopath, intolerant to lactose, tolerates a more gluten reduced diet, allergic to sulfa noted rash asa young child,   Seen previously by PCP Case Patrick on 7/15/20 for a physical and referred to the allergist for hx of allergies,   Did evisit 9/3/20 for dysuria & given antibiotics, did e visit nov 2029 for Covid symptoms & test ordered no result in epic noted . Did e visit on 12/9/20 for eczema & advised skin cares & OTC Hydrocortisone ointment prn, video visit done 6/14/21 for situational anxiety, had a therapist was doing acupuncture, self cares, yoga , meditation and CBT, seeing a herbalist, declined a daily med, given lorazepam to use prn & referred to a community psychiatrist to consider gene sight testing.   On 12/26 did an e visit for dysuria & given nitrofurantoin, [place]  negative.     Here for a preventive physical and additional concerns. New to this provider.  FH of colon cancer in a great maternal gm( mothers grand mother)  Sister had  fibroids  FH of thyroid and osteoporosis & HTN   Maternal Grandparents had dm 2   Agreeable to a  referral  Pap due in   One partner in adult life, monogamous with , no indication for STD testing  Ok with HIV / hep C screen part of routine preventive care per CDC recommendations  Health care maintenance reviewed.  Will be working on health care directives as thinking of trying to get pregnant in near future.    Notes anxiety was situational anxiety. Was anxious about Covid and was affecting sleep. Has a therapist. Does self cares, is under care of a herbalist. Prescribed lorazepam in  to use as needed. Not filled as not required yet. Finally got in to see a psychiatrist and plans to discuss gene sight testing with them.     Asthma stable on albuterol prn, hospitalized when was a child, not since, never intubated, calmed down as grew up. Mostly  Occurs after a resp infection. Hx of allergies last couple yr, more environmental like pollen in the spring and fall, not needing any meds. Needs albuterol refilled as last one  in .     ? Nasal congestion note don questions answered in tablet. Clarified wakes up with a dry mouth and dry throat. Think snores and may sleep with mouth open. BMI 28 but has had prior mandible surgery for hx of TMJ issues since 2018 and wears a mouth guard that helps in a addition to a humidifier. Wakes up refreshed after sleeping 8 hrs a day. When anxiety was high was waking up in the middle of the night. Her herbalist gave her a herb to use as needed. No known apnea. Sleeping on her side causes arm to fall asleep. Worried about circulation issue. Got a new pillow that's been helping shoulder pain too.     Hx of TMJ pains. S/p surgery x 2 of mandible and now uses a mouth guard at night. Hx of Carpal tunnel symptoms if on lap top long. Better ergonomics helps & sees a chiro who keeps an eye on it too. Has been working from home, improved after changed her desk  set up.    Pressure headaches, thinks related to TMJ. Better with use of mouth guard and focusing on improved hydration    Hx of dysuria recently, did an evist and given nitrofurantoin on 12/26  but not started yet.,  hx of urinary freq & urgency post menstruation cyclically.  vaginal discharge mentioned but feels more related to normal midcycle mucus.  LMP 12/10/21  Hx of giardia in past & read it can stay in body and cause symptoms but tested couple years ago & was neg    Curious if has IBS. Gut better with controlling diet & stress better. Got lactose intolerant as got older and now avoids dairy. Also did an elimination diet, noted some sensitivity to gluten & feels better on a gluten reduced diet.     Hx of eczema, flare up a lot in winter when dry  FH of melanoma. Hx of multiple moles. Has had one removed right breast area that was benign. In particular would like moles on right underwear line and one on posterior neck, & prior removed site rechecked today. Would like to be monitored for moles. Not seen a derm in a long time.     Vaccines reviewed. Notes had shingles disease few yrs ago, wonders when can get shingles vaccine  Never gets flu shot , as got it once and got ill and hesitant to get.   Tdap utd  Hesitant to get pneumovax today, will consider for another time.   Covid vaccine x 3 utd, reports got from [facility], no records in [facility], will send us a Pic of he Covid vaccine card with dates to update her chart by my chart later    Today's PHQ-2 Score:   PHQ-2 ( 1999 Pfizer) 12/26/2021   Q1: Little interest or pleasure in doing things 0   Q2: Feeling down, depressed or hopeless 1   PHQ-2 Score 1   PHQ-2 Total Score (12-17 Years)- Positive if 3 or more points; Administer PHQ-A if positive -   Q1: Little interest or pleasure in doing things Not at all   Q2: Feeling down, depressed or hopeless Several days   PHQ-2 Score 1     Abuse: Current or Past (Physical, Sexual or Emotional) - No  Do you feel safe in  your environment? Yes    Have you ever done Advance Care Planning? (For example, a Health Directive, POLST, or a discussion with a medical provider or your loved ones about your wishes): No, advance care planning information given to patient to review.  Patient plans to discuss their wishes with loved ones or provider.      Social History     Tobacco Use     Smoking status: Never Smoker     Smokeless tobacco: Never Used     Tobacco comment: Never smoked   Substance Use Topics     Alcohol use: Yes     Comment: rarely     If you drink alcohol do you typically have >3 drinks per day or >7 drinks per week? No    Alcohol Use 12/31/2021   Prescreen: >3 drinks/day or >7 drinks/week? -   Prescreen: >3 drinks/day or >7 drinks/week? No   No flowsheet data found.    Reviewed orders with patient.  Reviewed health maintenance and updated orders accordingly - Yes  Lab work is in process  Labs reviewed in EPIC  BP Readings from Last 3 Encounters:   12/31/21 122/84   07/15/20 110/79   02/26/20 130/78    Wt Readings from Last 3 Encounters:   12/31/21 93.4 kg (206 lb)   07/15/20 83 kg (183 lb)   02/26/20 84.4 kg (186 lb)                  Patient Active Problem List   Diagnosis     Anxiety     Mild intermittent asthma without complication     Family history of melanoma     Multiple pigmented nevi     Eczema, unspecified type     TMJ (temporomandibular joint syndrome)     BMI 28.0-28.9,adult     Snoring     Nonintractable episodic headache, unspecified headache type     Past Surgical History:   Procedure Laterality Date     MANDIBLE SURGERY  08/2004     ORTHOPEDIC SURGERY  2004    Jaw surgery     PE TUBES      as child.       Social History     Tobacco Use     Smoking status: Never Smoker     Smokeless tobacco: Never Used     Tobacco comment: Never smoked   Substance Use Topics     Alcohol use: Yes     Comment: rarely     Family History   Problem Relation Age of Onset     Osteoporosis Mother      Hypertension Father      Allergies  Father      Eye Disorder Father         Glasses     Anxiety Disorder Sister      Anxiety Disorder Sister      Thyroid Disease Maternal Grandmother      Heart Disease Maternal Grandfather         Pace Maker     Diabetes Maternal Grandfather         Type 2 late in life     Prostate Cancer Maternal Grandfather         Prostate     Eye Disorder Maternal Grandfather         Glasses     Hypertension Paternal Grandfather         Recently passed in December 2021     Colon Cancer Other         Mothers grandmother and great grandmother     Mental Illness Other         Bipolar     Osteoporosis Other         Aunt     Thyroid Disease Other         Maternal aunt     Obesity Other         Maternal cousin         Current Outpatient Medications   Medication Sig Dispense Refill     albuterol (PROAIR HFA/PROVENTIL HFA/VENTOLIN HFA) 108 (90 Base) MCG/ACT inhaler Inhale 2 puffs into the lungs every 6 hours as needed for shortness of breath / dyspnea or wheezing 18 g 1     Cyanocobalamin (VITAMIN B-12) 1000 MCG/15ML LIQD        fish oil-omega-3 fatty acids 1000 MG capsule Take 2 g by mouth daily       Multiple Vitamins-Iron (MULTIVITAMIN/IRON PO) Take  by mouth daily.       UNABLE TO FIND 50 mg MEDICATION NAME: Calm 50 mg       Zinc 30 MG CAPS        Allergies   Allergen Reactions     Lactose GI Disturbance and Itching     Sulfa Drugs Rash     As a child     Recent Labs   Lab Test 07/15/20  0847   *   HDL 52   TRIG 64   TSH 2.05        Breast Cancer Screening:    FHS-7:   Breast CA Risk Assessment (FHS-7) 12/31/2021   Did any of your first-degree relatives have breast or ovarian cancer? No   Did any man in your family have breast cancer? Unknown   Did any woman in your family have breast and ovarian cancer? Unknown   Did any woman in your family have breast cancer before age 50 y? Unknown   Do you have 2 or more relatives with breast and/or ovarian cancer? Unknown   Do you have 2 or more relatives with breast and/or bowel cancer?  "Unknown     Patient under 40 years of age: Routine Mammogram Screening not recommended.   Pertinent mammograms are reviewed under the imaging tab.    History of abnormal Pap smear:   NO - age 30-65 PAP every 5 years with negative HPV co-testing recommended  Last 3 Pap and HPV Results:   PAP / HPV 2012   PAP (Historical) NIL     PAP / HPV 2012   PAP (Historical) NIL     Reviewed and updated as needed this visit by clinical staff  Tobacco  Allergies  Meds   Med Hx  Surg Hx  Fam Hx  Soc Hx       Reviewed and updated as needed this visit by Provider  Tobacco  Allergies  Meds   Med Hx  Surg Hx  Fam Hx        Past Medical History:   Diagnosis Date     CARDIOVASCULAR SCREENING; LDL GOAL LESS THAN 160 2012     Contraceptive management 2012     Jaw pain 2021     Plantar warts 2012     Uncomplicated asthma       Past Surgical History:   Procedure Laterality Date     MANDIBLE SURGERY  2004     ORTHOPEDIC SURGERY      Jaw surgery     PE TUBES      as child.     OB History    Para Term  AB Living   0 0 0 0 0 0   SAB IAB Ectopic Multiple Live Births   0 0 0 0 0       Review of Systems   HENT: Positive for congestion.    Breasts:  Negative for tenderness, breast mass and discharge.   Genitourinary: Positive for frequency, urgency and vaginal discharge. Negative for pelvic pain and vaginal bleeding.   Musculoskeletal: Positive for arthralgias.   Neurological: Positive for headaches.   Psychiatric/Behavioral: The patient is nervous/anxious.       OBJECTIVE:   /84 (BP Location: Left arm, Patient Position: Sitting, Cuff Size: Adult Regular)   Pulse 115   Temp 97.3  F (36.3  C) (Temporal)   Resp 16   Ht 1.797 m (5' 10.75\")   Wt 93.4 kg (206 lb)   LMP 12/10/2021 (Exact Date)   SpO2 99%   BMI 28.93 kg/m    Physical Exam  GENERAL: healthy, alert, no distress and over weight  EYES: Eyes grossly normal to inspection, PERRL and conjunctivae and sclerae normal, wears " glasses  HENT: ear canals and TM's normal, nose and mouth without ulcers or lesions  NECK: no adenopathy, no asymmetry, masses, or scars and thyroid normal to palpation  RESP: lungs clear to auscultation - no rales, rhonchi or wheezes  BREAST: normal without masses, tenderness or nipple discharge and no palpable axillary masses or adenopathy  CV: regular rate and rhythm, normal S1 S2, no S3 or S4, no murmur, click or rub, no peripheral edema and peripheral pulses strong  ABDOMEN: soft, non tender, no hepatosplenomegaly, no masses and bowel sounds normal  MS: no gross musculoskeletal defects noted, no edema  SKIN: no suspicious lesions or rashes, multiple pigmented nevi, no suspicious findings prior right breast mole removal site, has a Seborrheic keratosis posterior neck, multiple lentigines, cherry angiomas and right inguinal area a 1 cm compound nevus with choe growing out of it that looks benign.   NEURO: Normal strength and tone, mentation intact and speech normal  PSYCH: mentation appears normal, affect normal/bright  LYMPH: no cervical, supraclavicular, axillary, or inguinal adenopathy    Diagnostic Test Results:  Labs reviewed in Epic  Results for orders placed or performed in visit on 12/31/21 (from the past 24 hour(s))   CBC with platelets and differential    Narrative    The following orders were created for panel order CBC with platelets and differential.  Procedure                               Abnormality         Status                     ---------                               -----------         ------                     CBC with platelets and d...[[id number]]                      Final result                 Please view results for these tests on the individual orders.   UA Macro with Reflex to Micro and Culture - lab collect    Specimen: Urine, Midstream   Result Value Ref Range    Color Urine Yellow Colorless, Straw, Light Yellow, Yellow    Appearance Urine Clear Clear    Glucose Urine Negative  Negative mg/dL    Bilirubin Urine Negative Negative    Ketones Urine Trace (A) Negative mg/dL    Specific Gravity Urine >=1.030 1.003 - 1.035    Blood Urine Negative Negative    pH Urine 5.5 5.0 - 7.0    Protein Albumin Urine Negative Negative mg/dL    Urobilinogen Urine 0.2 0.2, 1.0 E.U./dL    Nitrite Urine Negative Negative    Leukocyte Esterase Urine Trace (A) Negative   CBC with platelets and differential   Result Value Ref Range    WBC Count 9.4 4.0 - 11.0 10e3/uL    RBC Count 4.60 3.80 - 5.20 10e6/uL    Hemoglobin 13.2 11.7 - 15.7 g/dL    Hematocrit 39.9 35.0 - 47.0 %    MCV 87 78 - 100 fL    MCH 28.7 26.5 - 33.0 pg    MCHC 33.1 31.5 - 36.5 g/dL    RDW 13.6 10.0 - 15.0 %    Platelet Count 213 150 - 450 10e3/uL    % Neutrophils 72 %    % Lymphocytes 18 %    % Monocytes 7 %    % Eosinophils 3 %    % Basophils 1 %    % Immature Granulocytes 0 %    Absolute Neutrophils 6.7 1.6 - 8.3 10e3/uL    Absolute Lymphocytes 1.7 0.8 - 5.3 10e3/uL    Absolute Monocytes 0.6 0.0 - 1.3 10e3/uL    Absolute Eosinophils 0.3 0.0 - 0.7 10e3/uL    Absolute Basophils 0.1 0.0 - 0.2 10e3/uL    Absolute Immature Granulocytes 0.0 <=0.4 10e3/uL   Urine Microscopic   Result Value Ref Range    Bacteria Urine Moderate (A) None Seen /HPF    RBC Urine 0-2 0-2 /HPF /HPF    WBC Urine 0-5 0-5 /HPF /HPF    Squamous Epithelials Urine Moderate (A) None Seen /LPF    Mucus Urine Present (A) None Seen /LPF    Narrative    Urine Culture not indicated   Wet prep - Clinic Collect    Specimen: Vagina; Swab   Result Value Ref Range    Trichomonas Absent Absent    Yeast Absent Absent    Clue Cells Absent Absent    WBCs/high power field None None       ASSESSMENT/PLAN:       ICD-10-CM    1. Routine history and physical examination of adult  Z00.00 Comprehensive metabolic panel (BMP + Alb, Alk Phos, ALT, AST, Total. Bili, TP)     Lipid Profile (Chol, Trig, HDL, LDL calc)     HIV Antigen Antibody Combo     Hepatitis C Screen Reflex to HCV RNA Quant and Genotype      Comprehensive metabolic panel (BMP + Alb, Alk Phos, ALT, AST, Total. Bili, TP)     Lipid Profile (Chol, Trig, HDL, LDL calc)     HIV Antigen Antibody Combo     Hepatitis C Screen Reflex to HCV RNA Quant and Genotype   2. Family history of colon cancer  Z80.0 Adult Genetics & Metabolism Referral   3. Anxiety  F41.9 TSH with free T4 reflex     TSH with free T4 reflex   4. Mild intermittent asthma without complication  J45.20 Asthma Action Plan (AAP)     CBC with platelets and differential     albuterol (PROAIR HFA/PROVENTIL HFA/VENTOLIN HFA) 108 (90 Base) MCG/ACT inhaler     CBC with platelets and differential   5. Nasal congestion  R09.81    6. Snoring  R06.83    7. BMI 28.0-28.9,adult  Z68.28    8. TMJ (temporomandibular joint syndrome)  M26.609    9. Nonintractable episodic headache, unspecified headache type  R51.9    10. History of dysuria  Z87.898 UA Macro with Reflex to Micro and Culture - lab collect     UA Macro with Reflex to Micro and Culture - lab collect     Urine Microscopic   11. Urinary frequency  R35.0    12. Urinary urgency  R39.15    13. Vaginal discharge  N89.8 Wet prep - Clinic Collect   14. Family history of melanoma  Z80.8 Adult Dermatology Referral   15. Multiple pigmented nevi  D22.9 Adult Dermatology Referral   16. Eczema, unspecified type  L30.9 Adult Dermatology Referral   17. Health care maintenance  Z00.00 REVIEW OF HEALTH MAINTENANCE PROTOCOL ORDERS   18. Need for prophylactic vaccination and inoculation against influenza  Z23    19. Need for 23-polyvalent pneumococcal polysaccharide vaccine  Z23    20. High priority for COVID-19 vaccination  Z23    21. Screening, anemia, deficiency, iron  Z13.0 Comprehensive metabolic panel (BMP + Alb, Alk Phos, ALT, AST, Total. Bili, TP)     Comprehensive metabolic panel (BMP + Alb, Alk Phos, ALT, AST, Total. Bili, TP)   22. Encounter for lipid screening for cardiovascular disease  Z13.220 Lipid Profile (Chol, Trig, HDL, LDL calc)    Z13.6 Lipid  Profile (Chol, Trig, HDL, LDL calc)   23. Screening for HIV (human immunodeficiency virus)  Z11.4 HIV Antigen Antibody Combo     HIV Antigen Antibody Combo   24. Need for hepatitis C screening test  Z11.59 Hepatitis C Screen Reflex to HCV RNA Quant and Genotype     Hepatitis C Screen Reflex to HCV RNA Quant and Genotype     Seen for preventive health and additional concerns today   Self breast check regularly   Referral given to the  given family history of colon cancer and melanoma.  Mammogram not due till age 40  Pelvic / PAP due in 2024  Health care maintenance reviewed.  Labs today and will make further recommendations once reviewed  Consider the flu vaccine currently opts to defer.  Consider pneumonia vaccine given history of asthma will consider it in the new year.  Covid vaccination discussed. Reported had Pfizer Covid April 2 week and then the second shot 3 to 4 weeks later and got her booster beginning of December. Got it at [facility]. We will look forward to her uploading a copy of her Covid vaccination card by my chart and I can update it in to Epic.    Situational anxiety currently manageable with self cares. Has a therapist. Currently does not feel need for daily medication recently set up to see a psychiatrist. Has not started the benzodiazepines given by another provider previously. Discussed while benzodiazepines very effectively reduce anxiety immediately, they are not recommended for long term use as they may cause more harm than good (memory problems, addiction, and not helping people actually deal with anxiety). They can be addictive, can be abused, and there are better options for long term management of anxiety, with the most effective combining medication and therapy.  I try to recommend therapies that help the patient use their resources to overcome their symptoms successfully, rather than using a medication that may prevent someone from developing the ability to cope with stressors.  Given is planning pregnancy would avoid this medication. Encouraged to discuss more with her new community psychiatrist when sees them as they would be the best person to assess what med is best for her & can do Gene Sight testing if not done by the  and discuss options. Would recommend her community psychiatrist manage any medications for you.     Counseled that there is no actual test for irritable bowel syndrome, it is a diagnosis of exclusion. Given her history and exam and no concerning findings do not suspect anything alarming going on with her bowels. There is a brain gut connection and its possible she  does have irritable bowel. Self cares and avoiding triggers helps. If avoiding dairy and gluten helps her gut, to continue to do so.    Mild intermittent asthma currently asymptomatic and doing well. Albuterol refilled and asthma action plan given in my chart.    Clarified does not have nasal congestion but may be sleeping with mouth open and some snoring related to TMJ though improved with the mouthguard. A humidifier will help. May try saline spray to the nose at bedtime to decongest nose so can sleep with mouth closed and this may help prevent waking up with a dry mouth  Notes is waking up refreshed. Currently sleep evaluation not needed. Try to sleep on side if possible or using a wedge pillow given hx of snoring. Losing some weight will also help.    BMI 28 and discussed briefly diet, aerobic exercise and some weight loss.    For TMJ and prior mandibular surgery using a mouthguard. To continue care with her dentist.    Tension/pressure headaches likely related to stress and TMJ. Mouthguard helps. Continue with self cares & staying hydrated.     History of dysuria improved. Did an Evisit few days ago given nitrofurantoin not started as symptoms improving. Has history of urinary frequency, urgency post menstruation. Recommend when having symptoms to get a urine test done for appropriate  antibiotic stewardship, rather than jump to getting an antibiotic to prevent resistance and super gut infections.  Void after intercourse, avoid underwear at night, avoid tight clothing, reduce detergent or increase water in wash cycle when cleaning underwear to prevent chemical irritation. We will check wet prep and UA today    Vaginal discharge normal physiologic midcycle. Wet prep done as a precaution.    Family history of melanoma, multiple moles currently look benign on exam today. Referral given to dermatology for mole mapping skin check.    History of eczema goes along with the triad of allergies and asthma. Cold weather and long hot showers can make it worse. Recommend lubricating skin well with a good emollient like  Aquaphor, Sarna, cerave or Eucerin, applying to skin immediately after shower to lock in moisture and may use over-the-counter hydrocortisone ointment to any flare ups. May also discuss further with dermatology when sees them.    Planning to get pregnant. Recommend starting prenatal vitamin with folic acid at least 0.4 mg and avoiding alcohol for at least 3 months prior to wanting pregnancy. If after active trying during peak ovulation time & 6 months go by and still struggling to conceive can refer to OB    We will send the results to my chart.  Return in 1 year for preventive physical and sooner in an office visit for any new or persistent or worsening concerns.    Patient has been advised of split billing requirements and indicates understanding: Yes  COUNSELING:  Reviewed preventive health counseling, as reflected in patient instructions       Regular exercise       Healthy diet/nutrition       Vision screening       Immunizations    Declined: Influenza and Pneumococcal due to Concerns about side effects/safety           Alcohol Use       Contraception       Family planning       Osteoporosis prevention/bone health       Colon cancer screening       Consider Hep C screening for all patients  "one time for ages 18-79 years       HIV screenin x in teen years, 1 x in adult years, and at intervals if high risk       The ASCVD Risk score (Florian YORK Jr., et al., 2013) failed to calculate for the following reasons:    The 2013 ASCVD risk score is only valid for ages 40 to 79       Advance Care Planning    Estimated body mass index is 28.93 kg/m  as calculated from the following:    Height as of this encounter: 1.797 m (5' 10.75\").    Weight as of this encounter: 93.4 kg (206 lb).    Weight management plan: Discussed healthy diet and exercise guidelines    She reports that she has never smoked. She has never used smokeless tobacco.      Counseling Resources:  ATP IV Guidelines  Pooled Cohorts Equation Calculator  Breast Cancer Risk Calculator  BRCA-Related Cancer Risk Assessment: FHS-7 Tool  FRAX Risk Assessment  ICSI Preventive Guidelines  Dietary Guidelines for Americans,   USDA's MyPlate  ASA Prophylaxis  Lung CA Screening    Mary Ann Taylor MD  [facility]        "

## 2021-12-31 NOTE — PATIENT INSTRUCTIONS
Seen for preventive health and additional concerns today   Self breast check regularly   Referral given to the  given family history of colon cancer and melanoma.  Mammogram not due till age 40  Pelvic / PAP due in 2024  Health care maintenance reviewed.  Labs today and will make further recommendations once reviewed  Consider the flu vaccine currently opts to defer.  Consider pneumonia vaccine given history of asthma will consider it in the new year.  Covid vaccination discussed. Reported had Pfizer Covid April 2 week and then the second shot 3 to 4 weeks later and got her booster beginning of December. Got it at [facility]. We will look forward to you uploading a copy of your Covid vaccination card by my chart and I can update it in to your electronic chart.    Situational anxiety currently manageable with self cares. Has a therapist. Currently does not feel need for daily medication recently set up to see a psychiatrist. Has not started the benzodiazepines given by another provider previously. Discussed while benzodiazepines very effectively reduce anxiety immediately, they are not recommended for long term use as they may cause more harm than good (memory problems, addiction, and not helping people actually deal with anxiety). They can be addictive, can be abused, and there are better options for long term management of anxiety, with the most effective combining medication and therapy.  I try to recommend therapies that help the patient use their resources to overcome their symptoms successfully, rather than using a medication that may prevent someone from developing the ability to cope with stressors. Given you are planning pregnancy would avoid this medication. Discuss more with your psychiatrist when you see them as they would be the best person to assess what med is best for you when they see you & can do GeneSight if not done by the  and discuss options. Would recommend your community  psychiatrist to manage any medications for you.     There is no actual test for irritable bowel syndrome is a diagnosis of exclusion. Given your history and exam and no concerning findings do not suspect anything alarming going on with your bowels. There is a brain gut connection and its possible you do have irritable bowel. Self cares and avoiding triggers helps. If avoiding dairy and gluten helps your gut, continue to do so.    Mild intermittent asthma currently asymptomatic and doing well. Albuterol refilled and asthma action plan given in my chart.    Clarified does not have nasal congestion but may be sleeping with mouth open and some snoring related to TMJ though improved with the mouthguard. A humidifier will help. May try saline spray to the nose at bedtime to decongest nose so can sleep with mouth closed and this may help prevent waking up with a dry mouth  Waking up refreshed. Currently sleep evaluation not needed. Try to sleep on side if possible or using a wedge pillow. Losing some weight will also help.    BMI 28 and discussed briefly diet, aerobic exercise and some weight loss.    For TMJ and prior mandibular surgery using a mouthguard. continue care with your dentist.    Tension/pressure headaches likely related to stress and TMJ. Mouthguard helps. Continue with self cares.  & staying hydrated.     History of dysuria improved. Did an Evisit few days ago given nitrofurantoin not started as symptoms improving. Has history of urinary frequency urgency post menstruation. Recommend when having symptoms to get a urine test done for appropriate antibiotic stewardship, rather than jump to getting an antibiotic to prevent resistance and super gut infections.  Void after intercourse, avoid underwear at night, avoid tight clothing, reduce detergent or increase water in wash cycle when cleaning underwear to prevent chemical irritation. We will check wet prep and UA today    Vaginal discharge normal physiologic  midcycle. Wet prep done as a precaution.    Family history of melanoma, multiple moles currently look benign on exam today. Referral given to dermatology for mole mapping skin check.    History of eczema goes along with the triad of allergies and asthma. Cold weather and long hot showers can make it worse. Recommend lubricating skin well with a good emollient like  aquaphor, sarna, creavae or eucerin, applying to skin immediately after shower to lock in moisture and may use over-the-counter hydrocortisone ointment to any flareups. May also discuss further with dermatology when you see them.    Planning to get pregnant. Recommend starting prenatal vitamin with folic acid at least 0.4 mg and avoiding alcohol for at least 3 months prior to wanting pregnancy. If after active trying during peak ovulation time & 6 months go by and still struggling to conceive can refer to OB    We will send the results to my chart.  Return in 1 year for preventive physical and sooner in an office visit for any new or persistent or worsening concerns.    Preventive Health Recommendations  Female Ages 26 - 39  Yearly exam:   See your health care provider every year in order to    Review health changes.     Discuss preventive care.      Review your medicines if you your doctor has prescribed any.    Until age 30: Get a Pap test every three years (more often if you have had an abnormal result).    After age 30: Talk to your doctor about whether you should have a Pap test every 3 years or have a Pap test with HPV screening every 5 years.   You do not need a Pap test if your uterus was removed (hysterectomy) and you have not had cancer.  You should be tested each year for STDs (sexually transmitted diseases), if you're at risk.   Talk to your provider about how often to have your cholesterol checked.  If you are at risk for diabetes, you should have a diabetes test (fasting glucose).  Shots: Get a flu shot each year. Get a tetanus shot every 10  years.   Nutrition:     Eat at least 5 servings of fruits and vegetables each day.    Eat whole-grain bread, whole-wheat pasta and brown rice instead of white grains and rice.    Get adequate Calcium and Vitamin D.     Lifestyle    Exercise at least 150 minutes a week (30 minutes a day, 5 days of the week). This will help you control your weight and prevent disease.    Limit alcohol to one drink per day.    No smoking.     Wear sunscreen to prevent skin cancer.    See your dentist every six months for an exam and cleaning.

## 2021-12-31 NOTE — LETTER
My Asthma Action Plan    Name: Coty Doyle   YOB: 1985  Date: 12/31/2021   My doctor: Mary Ann Taylor MD   My clinic: [facility]        My Rescue Medicine:   Albuterol inhaler (Proair/Ventolin/Proventil HFA)  2-4 puffs EVERY 4 HOURS as needed. Use a spacer if recommended by your provider.   My Asthma Severity:   Intermittent / Exercise Induced  Know your asthma triggers: upper respiratory infections             GREEN ZONE   Good Control    I feel good    No cough or wheeze    Can work, sleep and play without asthma symptoms       Take your asthma control medicine every day.     1. If exercise triggers your asthma, take your rescue medication    15 minutes before exercise or sports, and    During exercise if you have asthma symptoms  2. Spacer to use with inhaler: If you have a spacer, make sure to use it with your inhaler             YELLOW ZONE Getting Worse  I have ANY of these:    I do not feel good    Cough or wheeze    Chest feels tight    Wake up at night   1. Keep taking your Green Zone medications  2. Start taking your rescue medicine:    every 20 minutes for up to 1 hour. Then every 4 hours for 24-48 hours.  3. If you stay in the Yellow Zone for more than 12-24 hours, contact your doctor.  4. If you do not return to the Green Zone in 12-24 hours or you get worse, start taking your oral steroid medicine if prescribed by your provider.           RED ZONE Medical Alert - Get Help  I have ANY of these:    I feel awful    Medicine is not helping    Breathing getting harder    Trouble walking or talking    Nose opens wide to breathe       1. Take your rescue medicine NOW  2. If your provider has prescribed an oral steroid medicine, start taking it NOW  3. Call your doctor NOW  4. If you are still in the Red Zone after 20 minutes and you have not reached your doctor:    Take your rescue medicine again and    Call 911 or go to the emergency room right away    See  your regular doctor within 2 weeks of an Emergency Room or Urgent Care visit for follow-up treatment.          Annual Reminders:  Meet with Asthma Educator,  Flu Shot in the Fall, consider Pneumonia Vaccination for patients with asthma (aged 19 and older).    Pharmacy: [id number] - [place], [place] - [address]    Electronically signed by Mary Ann Taylor MD   Date: 12/31/21                    Asthma Triggers  How To Control Things That Make Your Asthma Worse    Triggers are things that make your asthma worse.  Look at the list below to help you find your triggers and   what you can do about them. You can help prevent asthma flare-ups by staying away from your triggers.      Trigger                                                          What you can do   Cigarette Smoke  Tobacco smoke can make asthma worse. Do not allow smoking in your home, car or around you.  Be sure no one smokes at a child s day care or school.  If you smoke, ask your health care provider for ways to help you quit.  Ask family members to quit too.  Ask your health care provider for a referral to Quit Plan to help you quit smoking, or call 5-705-830-PLAN.     Colds, Flu, Bronchitis  These are common triggers of asthma. Wash your hands often.  Don t touch your eyes, nose or mouth.  Get a flu shot every year.     Dust Mites  These are tiny bugs that live in cloth or carpet. They are too small to see. Wash sheets and blankets in hot water every week.   Encase pillows and mattress in dust mite proof covers.  Avoid having carpet if you can. If you have carpet, vacuum weekly.   Use a dust mask and HEPA vacuum.   Pollen and Outdoor Mold  Some people are allergic to trees, grass, or weed pollen, or molds. Try to keep your windows closed.  Limit time out doors when pollen count is high.   Ask you health care provider about taking medicine during allergy season.     Animal Dander  Some people are allergic to skin flakes, urine or saliva from pets  with fur or feathers. Keep pets with fur or feathers out of your home.    If you can t keep the pet outdoors, then keep the pet out of your bedroom.  Keep the bedroom door closed.  Keep pets off cloth furniture and away from stuffed toys.     Mice, Rats, and Cockroaches  Some people are allergic to the waste from these pests.   Cover food and garbage.  Clean up spills and food crumbs.  Store grease in the refrigerator.   Keep food out of the bedroom.   Indoor Mold  This can be a trigger if your home has high moisture. Fix leaking faucets, pipes, or other sources of water.   Clean moldy surfaces.  Dehumidify basement if it is damp and smelly.   Smoke, Strong Odors, and Sprays  These can reduce air quality. Stay away from strong odors and sprays, such as perfume, powder, hair spray, paints, smoke incense, paint, cleaning products, candles and new carpet.   Exercise or Sports  Some people with asthma have this trigger. Be active!  Ask your doctor about taking medicine before sports or exercise to prevent symptoms.    Warm up for 5-10 minutes before and after sports or exercise.     Other Triggers of Asthma  Cold air:  Cover your nose and mouth with a scarf.  Sometimes laughing or crying can be a trigger.  Some medicines and food can trigger asthma.

## 2021-12-31 NOTE — RESULT ENCOUNTER NOTE
Keron Doyle,  Some of your results came back and are within acceptable limits. -Normal red blood cell (hgb) levels, normal white blood cell count and normal platelet levels.. If you have any further concerns please do not hesitate to contact us by message, phone or making an appointment.  Have a good day   Dr Brandon WEINSTEIN

## 2021-12-31 NOTE — RESULT ENCOUNTER NOTE
Keron Doyle,  Some of your results came back and are within acceptable limits. -No signs of bacteria or yeast vaginal infections.. If you have any further concerns please do not hesitate to contact us by message, phone or making an appointment.  Have a good day   Dr Brandon WEINSTEIN

## 2022-01-01 PROBLEM — M26.609 TMJ (TEMPOROMANDIBULAR JOINT SYNDROME): Status: ACTIVE | Noted: 2022-01-01

## 2022-01-01 PROBLEM — R51.9 NONINTRACTABLE EPISODIC HEADACHE, UNSPECIFIED HEADACHE TYPE: Status: ACTIVE | Noted: 2022-01-01

## 2022-01-01 PROBLEM — R06.83 SNORING: Status: ACTIVE | Noted: 2022-01-01

## 2022-01-01 LAB
ALBUMIN SERPL-MCNC: 4 G/DL (ref 3.4–5)
ALP SERPL-CCNC: 45 U/L (ref 40–150)
ALT SERPL W P-5'-P-CCNC: 16 U/L (ref 0–50)
ANION GAP SERPL CALCULATED.3IONS-SCNC: 5 MMOL/L (ref 3–14)
AST SERPL W P-5'-P-CCNC: 15 U/L (ref 0–45)
BILIRUB SERPL-MCNC: 0.5 MG/DL (ref 0.2–1.3)
BUN SERPL-MCNC: 11 MG/DL (ref 7–30)
CALCIUM SERPL-MCNC: 9 MG/DL (ref 8.5–10.1)
CHLORIDE BLD-SCNC: 105 MMOL/L (ref 94–109)
CHOLEST SERPL-MCNC: 219 MG/DL
CO2 SERPL-SCNC: 27 MMOL/L (ref 20–32)
CREAT SERPL-MCNC: 0.86 MG/DL (ref 0.52–1.04)
FASTING STATUS PATIENT QL REPORTED: ABNORMAL
GFR SERPL CREATININE-BSD FRML MDRD: 90 ML/MIN/1.73M2
GLUCOSE BLD-MCNC: 81 MG/DL (ref 70–99)
HDLC SERPL-MCNC: 55 MG/DL
LDLC SERPL CALC-MCNC: 150 MG/DL
NONHDLC SERPL-MCNC: 164 MG/DL
POTASSIUM BLD-SCNC: 3.9 MMOL/L (ref 3.4–5.3)
PROT SERPL-MCNC: 7.6 G/DL (ref 6.8–8.8)
SODIUM SERPL-SCNC: 137 MMOL/L (ref 133–144)
TRIGL SERPL-MCNC: 68 MG/DL
TSH SERPL DL<=0.005 MIU/L-ACNC: 1.77 MU/L (ref 0.4–4)

## 2022-01-01 ASSESSMENT — PATIENT HEALTH QUESTIONNAIRE - PHQ9: SUM OF ALL RESPONSES TO PHQ QUESTIONS 1-9: 2

## 2022-01-01 ASSESSMENT — ASTHMA QUESTIONNAIRES: ACT_TOTALSCORE: 24

## 2022-01-01 ASSESSMENT — ANXIETY QUESTIONNAIRES: GAD7 TOTAL SCORE: 4

## 2022-01-02 ENCOUNTER — MYC MEDICAL ADVICE (OUTPATIENT)
Dept: FAMILY MEDICINE | Facility: CLINIC | Age: 37
End: 2022-01-02
Payer: COMMERCIAL

## 2022-01-02 LAB
BACTERIA UR CULT: NORMAL
HCV AB SERPL QL IA: NONREACTIVE
HIV 1+2 AB+HIV1 P24 AG SERPL QL IA: NONREACTIVE

## 2022-01-03 NOTE — RESULT ENCOUNTER NOTE
Keron Ms. Pattie Doyle,  Your results came back  showing   -LDL(bad) cholesterol level is elevated which can increase your heart disease risk.  A diet high in fat and simple carbohydrates, genetics and being overweight can contribute to this. ADVISE: exercising 150 minutes of aerobic exercise per week (30 minutes for 5 days per week or 50 minutes for 3 days per week are options) and eating a low saturated fat/low carbohydrate diet are helpful to improve this. In 12 months, you should recheck your cholesterol panel.   -Liver and gallbladder tests are normal (ALT,AST, Alk phos, bilirubin), kidney function is normal (Cr, GFR), sodium is normal, potassium is normal, calcium is normal, glucose is normal.  -TSH (thyroid stimulating hormone) level is normal which indicates normal thyroid function.  -Hepatitis C antibody screen test shows no signs of a previous hepatitis C infection.  -HIV test is normal.  -Urine culture is normal.  There is no need for antibiotics at this point.  If new, worsening or persistent symptoms occur, then you should call or return for a recheck.. If you have any further concerns please do not hesitate to contact us by message, phone or making an appointment.  Have a good day   Dr Brandon WEINSTEIN

## 2022-01-27 ENCOUNTER — MYC MEDICAL ADVICE (OUTPATIENT)
Dept: FAMILY MEDICINE | Facility: CLINIC | Age: 37
End: 2022-01-27
Payer: COMMERCIAL

## 2022-04-06 PROBLEM — G44.219 EPISODIC TENSION-TYPE HEADACHE, NOT INTRACTABLE: Status: ACTIVE | Noted: 2022-04-06

## 2022-04-06 PROBLEM — K58.9 IRRITABLE BOWEL SYNDROME, UNSPECIFIED TYPE: Status: ACTIVE | Noted: 2022-04-06

## 2022-06-02 ENCOUNTER — MYC MEDICAL ADVICE (OUTPATIENT)
Dept: FAMILY MEDICINE | Facility: CLINIC | Age: 37
End: 2022-06-02
Payer: COMMERCIAL

## 2022-06-02 DIAGNOSIS — Z31.69 PRE-CONCEPTION COUNSELING: Primary | ICD-10-CM

## 2022-06-03 NOTE — TELEPHONE ENCOUNTER
Put in a referral to gyn for preconception counseling  Can see Dr Baugh here in clinic or one of her partners  Also sits in [place]  Looked at both tea bags  Fennel increases risk of bleeding and burdock can stimulate uterine activity as would passion flower extract so would recommend avoiding these if pregnant and ask gyn when you see them for safer alternatives if any

## 2022-06-03 NOTE — TELEPHONE ENCOUNTER
Would like to see gyn for pre pregnancy visit and question about tea's she can have while pregnant.    Thank you

## 2022-08-09 ENCOUNTER — E-VISIT (OUTPATIENT)
Dept: FAMILY MEDICINE | Facility: CLINIC | Age: 37
End: 2022-08-09
Payer: COMMERCIAL

## 2022-08-09 DIAGNOSIS — G47.00 INSOMNIA, UNSPECIFIED TYPE: Primary | ICD-10-CM

## 2022-08-09 PROCEDURE — 99421 OL DIG E/M SVC 5-10 MIN: CPT | Performed by: FAMILY MEDICINE

## 2022-09-09 ENCOUNTER — THERAPY VISIT (OUTPATIENT)
Dept: PHYSICAL THERAPY | Facility: CLINIC | Age: 37
End: 2022-09-09
Payer: COMMERCIAL

## 2022-09-09 DIAGNOSIS — G89.29 CHRONIC SHOULDER PAIN, UNSPECIFIED LATERALITY: ICD-10-CM

## 2022-09-09 DIAGNOSIS — M25.511 SHOULDER PAIN, RIGHT: ICD-10-CM

## 2022-09-09 DIAGNOSIS — M25.519 CHRONIC SHOULDER PAIN, UNSPECIFIED LATERALITY: ICD-10-CM

## 2022-09-09 PROCEDURE — 97110 THERAPEUTIC EXERCISES: CPT | Mod: GP | Performed by: PHYSICAL THERAPIST

## 2022-09-09 PROCEDURE — 97161 PT EVAL LOW COMPLEX 20 MIN: CPT | Mod: GP | Performed by: PHYSICAL THERAPIST

## 2022-09-09 NOTE — PROGRESS NOTES
Physical Therapy Initial Evaluation  Subjective:    Patient Health History  Coty LEE Pattie Yanira being seen for To review some chronic shoulder pain chayo had the last six months..     Problem began: 12/15/2021.   Problem occurred: I fell on ice this winter while walking the dog. Fell forward. I also felk down a small flight of stairs in april   Pain is reported as 0/10 on pain scale.  General health as reported by patient is good.  Pertinent medical history includes: asthma, mental illness, migraines/headaches and sleep disorder/apnea. Other medical history details: Na.     Medical allergies: other. Other medical allergies details: Sulfa.   Surgeries include:  Other. Other surgery history details: Jaw surgery 2004.    Current medications:  Other. Other medications details: Take a herb to help with sleep, also taking magnesium to help with muscle relaxation.    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   Primary job tasks include:  Computer work, driving, lifting/carrying, prolonged sitting, pushing/pulling and repetitive tasks.   Other job/home tasks details: Na.                Therapist Generated HPI Evaluation  Problem details: Pt presents to PT with a chief complaint of R>L shoulder pain with initial onset in 12/2021 after a fall on an outstretched hand with a subsequent fall down the stairs in 04/2022 (R shoulder in 90/90 position). Pain has slowly improved over time with chiropractic, massage, and activity modification. However, patient continues to have pain with reaching/lifting overhead and sleeping on involved side..         Type of problem:  Bilateral shoulders (R>L shoulder pain).    This is a chronic condition.  Condition occurred with:  A fall.  Where condition occurred: in the community.  Patient reports pain:  Anterior.  Pain is described as aching and is intermittent.  Pain radiates to:  Upper arm. Pain is worse during the night.  Since onset symptoms are unchanged.  Associated  symptoms:  Catching. Symptoms are exacerbated by carrying, lifting, using arm at shoulder level and using arm overhead (weight bearing)  Relieved by: magnesium   Imaging testing: n/a.  Previous treatment includes chiropractic. There was moderate improvement following previous treatment.  Restrictions due to condition include:  Working in normal job without restrictions.  Barriers include:  None as reported by patient.                        Objective:  Standing Alignment:      Shoulder/UE:  Rounded shoulders                                  Cervical/Thoracic Evaluation    AROM:  AROM Cervical:    Flexion:            WNL  Extension:       Min loss, pain + R cervical   Rotation:         Left: WNL     Right: WNL  Side Bend:      Left: Min loss     Right:  WNL        Cervical Myotomes:  normal                                       Shoulder Evaluation:  ROM:  AROM:    Flexion:  Left:  145    Right:  135    Abduction:  Left: 150   Right:  140    Internal Rotation:  Left:  WNL    Right:  Min loss  External Rotation:  Left:  WNL    Right:  WNL                  Pain: Pain + and stiffness with end range R shoulder elevation PROM=AROM    Strength:    Flexion: Left:5/5   Pain:    Right: 4+/5      Pain:  +    Abduction:  Left: 5-/5  Pain:    Right: 5-/5     Pain:    Internal Rotation:  Left:5/5     Pain:    Right: 5/5     Pain:  External Rotation:   Left:5-/5     Pain:   Right:4+/5     Pain:              Special Tests:      Right shoulder positive for the following special tests:Impingement  Palpation:      Right shoulder tenderness present at: Biceps                                     General     ROS    Assessment/Plan:    Patient is a 37 year old female with both sides shoulder complaints.    Patient has the following significant findings with corresponding treatment plan.                Diagnosis 1:  B shoulder pain  Pain -  manual therapy, splint/taping/bracing/orthotics, self management, education and home  program  Decreased ROM/flexibility - manual therapy and therapeutic exercise  Decreased strength - therapeutic exercise and therapeutic activities  Impaired muscle performance - neuro re-education  Impaired posture - neuro re-education    Therapy Evaluation Codes:     Cumulative Therapy Evaluation is: Low complexity.    Previous and current functional limitations:  (See Goal Flow Sheet for this information)    Short term and Long term goals: (See Goal Flow Sheet for this information)     Communication ability:  Patient appears to be able to clearly communicate and understand verbal and written communication and follow directions correctly.  Treatment Explanation - The following has been discussed with the patient:   RX ordered/plan of care  Anticipated outcomes  Possible risks and side effects  This patient would benefit from PT intervention to resume normal activities.   Rehab potential is good.    Frequency:  1 X week, once daily  Duration:  for 6 weeks  Discharge Plan:  Achieve all LTG.  Independent in home treatment program.  Reach maximal therapeutic benefit.    Please refer to the daily flowsheet for treatment today, total treatment time and time spent performing 1:1 timed codes.

## 2022-09-15 ENCOUNTER — THERAPY VISIT (OUTPATIENT)
Dept: PHYSICAL THERAPY | Facility: CLINIC | Age: 37
End: 2022-09-15
Payer: COMMERCIAL

## 2022-09-15 DIAGNOSIS — M25.511 SHOULDER PAIN, RIGHT: Primary | ICD-10-CM

## 2022-09-15 PROCEDURE — 97110 THERAPEUTIC EXERCISES: CPT | Mod: GP | Performed by: PHYSICAL THERAPIST

## 2022-09-15 PROCEDURE — 97112 NEUROMUSCULAR REEDUCATION: CPT | Mod: GP | Performed by: PHYSICAL THERAPIST

## 2022-09-15 PROCEDURE — 97140 MANUAL THERAPY 1/> REGIONS: CPT | Mod: GP | Performed by: PHYSICAL THERAPIST

## 2022-09-23 ENCOUNTER — VIRTUAL VISIT (OUTPATIENT)
Dept: FAMILY MEDICINE | Facility: CLINIC | Age: 37
End: 2022-09-23
Payer: COMMERCIAL

## 2022-09-23 DIAGNOSIS — G47.00 INSOMNIA, UNSPECIFIED TYPE: Primary | ICD-10-CM

## 2022-09-23 DIAGNOSIS — F41.9 ANXIETY: ICD-10-CM

## 2022-09-23 DIAGNOSIS — R06.83 SNORING: ICD-10-CM

## 2022-09-23 PROBLEM — G47.9 SLEEP DISORDER: Status: ACTIVE | Noted: 2022-09-23

## 2022-09-23 PROCEDURE — 99215 OFFICE O/P EST HI 40 MIN: CPT | Mod: 95 | Performed by: FAMILY MEDICINE

## 2022-09-23 RX ORDER — LANOLIN ALCOHOL/MO/W.PET/CERES
3 CREAM (GRAM) TOPICAL
Qty: 30 TABLET | Refills: 1 | Status: SHIPPED | OUTPATIENT
Start: 2022-09-23 | End: 2023-03-02

## 2022-09-23 ASSESSMENT — ASTHMA QUESTIONNAIRES
QUESTION_4 LAST FOUR WEEKS HOW OFTEN HAVE YOU USED YOUR RESCUE INHALER OR NEBULIZER MEDICATION (SUCH AS ALBUTEROL): NOT AT ALL
QUESTION_3 LAST FOUR WEEKS HOW OFTEN DID YOUR ASTHMA SYMPTOMS (WHEEZING, COUGHING, SHORTNESS OF BREATH, CHEST TIGHTNESS OR PAIN) WAKE YOU UP AT NIGHT OR EARLIER THAN USUAL IN THE MORNING: NOT AT ALL
QUESTION_1 LAST FOUR WEEKS HOW MUCH OF THE TIME DID YOUR ASTHMA KEEP YOU FROM GETTING AS MUCH DONE AT WORK, SCHOOL OR AT HOME: NONE OF THE TIME
ACT_TOTALSCORE: 25
QUESTION_2 LAST FOUR WEEKS HOW OFTEN HAVE YOU HAD SHORTNESS OF BREATH: NOT AT ALL
QUESTION_5 LAST FOUR WEEKS HOW WOULD YOU RATE YOUR ASTHMA CONTROL: COMPLETELY CONTROLLED
ACT_TOTALSCORE: 25

## 2022-09-23 NOTE — PROGRESS NOTES
Coty is a 37 year old who is being evaluated via a billable video visit.      How would you like to obtain your AVS? MyChart  If the video visit is dropped, the invitation should be resent by: Text to cell phone: [phone]  Will anyone else be joining your video visit? No     Assessment & Plan     Insomnia, unspecified type- Adult Sleep Eval & Management  Referral; Future  - doxylamine (UNISOM) 25 MG TABS tablet; Take 1 tablet (25 mg) by mouth nightly as needed for sleep  - melatonin 3 MG tablet; Take 1 tablet (3 mg) by mouth nightly as needed for sleep  - Adult Mental Health  Referral; Future    Snoring- Adult Sleep Eval & Management  Referral; Future    Anxiety- in therapy and sees a psychiatrist, not ready for a daily med yet. In DBT.     Insomnia, trouble falling asleep, snoring , anxiety etc discussed  Continue with sleep hygiene as doing   Options discussed  Ok to continue herbals for now and stop when pregnant or if advised otherwise by gyn in future  Melatonin is safe to take long term & sent in 3 mg at bedtime to use as needed, may increase to 6 mg bedtime if need be  Sent in Unisom 25 mg may use safely now and during pregnancy as needed  Referred to sleep for a sleep specialist and sleep psychologist ( sleep CBT)   Continue with her individual  therapist for DBT  Maybe the sleep psychologist can suggest who she can see for a psychiatrist but continue with current one till then  Continue with self management cares as doing for anxiety as that can affect sleep   Can refer to gyn when pregnant about next steps  Return in dec for an in person physical and pap and can get flu and new Covid bivalent omicron vaccine anytime before that   We can do labs at physical but fact that she is menstruating suggests no hormonal issues     Also included some resources in AVS as below    General recommendations for sleep problems (Insomnia)  Allow 2-4 weeks to see results     Establish a regular  sleep schedule   Go to bed at same time each night, get up at same time each day, avoid sleeping-in on weekends.  Cut down time in bed (if not asleep, get up, go in other room, do something calming and boring such as sorting papers, making warm milk, knitting, dusting)   Avoid trying to force yourself to sleep.  Use your bed only for sleep. Do not read or watch Television in bed.     Make the bedroom comfortable  Keep the temperature in your bedroom comfortable, keep bedroom quiet when sleeping, and consider ear plugs (silicon) especially if you have partner who snores.  Keep bedroom dark enough:  use dark blinds or wear an eye mask if needed.    Relax at bedtime.    Do not watch TV or play video games or surf on computer right before bed; the full spectrum light actually stimulates your brain!  Relax each muscle group individually ; begin with your feet, work toward your head. Deal with your worries before bedtime by setting aside a worry time for 30 minutes earlier or journal your thoughts.  Listen to relaxation tapes such as Classical Music or Nature sounds or imagine a tranquil scene (e.G. waterfall or beach)   Back Massage : Gentle 5-minute back rub prior to bedtime can help relax you.    Perform measures to make you tired at bedtime.   Get regular Exercise each day (at least 6 hours before bedtime)   Take medications only as directed   Eat a light bedtime snack or warm drink, such as milk or herbal tea (non-caffeinated)   No Napping during day     Stimulus Control   Do not lie awake for more than 30 minutes.   Get out of bed and perform a quiet activity, then return to bed when sleepy.  Repeat as many times per night as needed     Things to avoid   Do not Exercise just before bedtime   No overstimulating activities just before bed, such as competitive games or exciting Television programs  Avoid caffeine (coffee, tea, soda), chocolate after lunchtime   Do not use alcohol to induce sleep (worsens Insomnia)   Do  not take someone else's sleeping pills   Do not look at the clock when awakening   Do not turn on light when getting up to use bathroom     Instructions for treating Delayed Sleep Phase Syndrome:    Delayed Sleep Phase Syndrome (DSPS) means that your body's internal timing is set late compared to the 24 hour day. Therefore, it is often difficult to get up on time for work in the morning and sometimes difficult to fall asleep on time, in order to get enough sleep. People with DSPS often tend to like to stay up late on weekends and sleep in until between 10 AM and noon, sometimes even later.This is actually a bad habit that will perpetuate the problem. It reinforces your body's tendency to be on that later schedule.    You should go to bed when you are sleepy and ready to sleep. During this entire process, you should not engage in activities that may make it worse, such as watching TV in bed, leaving the TV on all night, drinking any caffeine 6 hours before bed or exercising 1-2 hours before bed.     Start taking Melatonin, 1 mg tablet 3-5 hours before the time that you fall asleep on average (not your desired bedtime or time that you get in bed, but the time you normally fall asleep on your own).      Upon awakening, get exposure to sun-light for about 30-45 minutes. You do not need to look at the sun, in fact, this is dangerous. Reading the paper with the sun shining on you is adequate.  Alternatively, you may use a Seasonal Affective Disorder Lamp (intensity 10,000 Lux) instead of the sun. The lamp should be positioned 1-2 arms lengths away from you. They lamps are sold at Home Medical Companies such as [employer] or [employer]. A prescription can be written to get insurance coverage in some cases. They are also sold on [employer].com.    Using the light and melatonin should help march your internal clock (known as your circadian rhythms) gradually earlier. As your bedtime advances, remember to take your  melatonin earlier, keeping it 5 hours before your fall asleep time.    Avoid naps and sleeping in because sleeping during the day will delay your body's clock and you will have to start from scratch.     More information about light therapy:  If the cost of any light box is too much, you can also purchase a compact fluorescent all spectrum light bulb at a local hardware store for about $8.  The most commonly available bulb is 1400 lumen.  You would need two of these positioned within 1 meter of yourself to be equivalent to 2,500 lux.  The bulbs can be placed in a standard light fixture.  Additionally, they can be placed in a mountable fixture that is used in greenhouses.  Mountable fixtures are also available at hardware stores for about $9.  Do not look directly at the light.  If you have any concerns regarding the safety of bright light therapy for you, it is recommended that you consult an ophthalmologist before using a light box.  If you have a condition that makes your eyes very sensitive to light, macular degeneration, a family history of such problems, or diabetic changes to your eyes, consult an ophthalmologist before using a light box. If you have anxiety disorder and have an increase in anxiety discontinue use.  Make a relaxing routine prior to bedtime  Relaxation exercises:              Progressive muscle relaxation: Relax each muscle group individually                Begin with your feet, flex, then relax. Try to imagine your feet feeling heavy and sinking into the bed. Move to your calves, do the same thing. Work through each muscle group toward your head.              Relaxing Mental Imagery: Try to imagine a trip that you took and found relaxing, or imagine a day at the beach. Try to walk yourself through the day in your mind as if you were dreaming it. Try to imagine sensing the different experiences, such as feeling sand between your toes, the heat of the sun on your skin, seeing the waves crashing  the shore, the smell of the salt water, etc.     Deal with your worries before bedtime                Set aside a worry time around dinner time for 10-15 minutes. Write down the                things that are on your mind. Plan time in the coming days to address those                issues. Brainstorm ideas on how you will deal with them. Try to identify issues                that are out of your control, and try to let those issues go.  Listen to relaxation tapes    Classical Music or Nature sounds    Back Massage    Get regular exercise each day (at least 1-2 hours before bedtime)    Take medications only as directed    Eat a light bedtime snack or warm drink    Warm milk    Warm herbal tea (non-caffeinated)       Things to avoid    No overstimulating activities just before bed    No competitive games before bedtime    No exciting television programs before bedtime    Avoid caffeine after lunchtime    Avoid chocolate    Do not use alcohol to induce sleep (worsens Insomnia)    Do not take someone else's sleeping pills    Do not look at the clock when awakening    Do not turn on light when getting up to use bathroom, use a nightlight     Online Programs     www.[employer] (pronounced shut eye). There is a fee for this program. Enter the code  [place]  if you decide to enroll in this program.        www.sleepIO.com (pronounced sleep ee oh). There is a fee for this program. Enter the code  [place]  if you decide to enroll in this program.    Suggested Resources  Insomnia Treatment Books      Overcoming Insomnia by Fernando Cervantes and Marylin Michel (2008)    No More Sleepless Nights by Tigre Matos and Gillian Clark (1996)    Say Kelsy to Insomnia by Mauricio Landaverde (2009)    The Insomnia Workbook by Sita Nguyễn and Cassius Willis (2009)    The Insomnia Answer by Brandon Vieira and Dank Harvey (2006)      Stress Management and Relaxation Books    The Relaxation and Stress Reduction Workbook by Vonda  Katerine Garland and Zelalem Napier (2008)    Stress Management Workbook: Techniques and Self-Assessment Procedures by Brunilda Dexter and Joby Cruz (1997)    A Mindfulness-Based Stress Reduction Workbook by Roland Ramirez and Samira Waldron (2010)    The Complete Stress Management Workbook by Juan Tijerina, Esteban Adams and Juan Garcia (1996)    Assert Yourself by Stacy Adhikari and Dash Ahdikari (1977)    Relaxation Resources for Computer Download   These websites offer resources to help you relax. This list is for information only. [facility] is not responsible for the quality of services or the actions of any person or organization.  Progressive Muscle Relaxation (PMR):      http://www.[employer]ark/progressive-muscle-relaxation-exercise.html    http://studentsupport.[facility]/counseling/resources/self-help/relaxation-and-stress-management/  Deep Breathing Exercises:    http://www.[employer]ark/breathing-awareness.html    Meditation:         [employer]    www.[employer] Raisemeditation-site.[employer] You may have to pay for some of these resources.    Guided Imagery:    http://www.[employer]ark/guided-imagery-scripts.html      http://Anad[employer]/library/pwrloqihxd-qrnqcy-nmupmsx/    Counseling / Behavioral Health  [facility]  Visit [email or web address] or call [phone] to find a clinic close to you.  Or call [phone] for [facility].    Review of the result(s) of each unique test - labs since dec 2021  Diagnosis or treatment significantly limited by social determinants of health - anxiety  Ordering of each unique test  Prescription drug management  42 minutes spent on the date of the encounter doing chart review, history and exam, documentation and further activities per the note     BMI:   Estimated body mass index is 28.93 kg/m  as calculated from the following:    Height as of 12/31/21:  "1.797 m (5' 10.75\").    Weight as of 12/31/21: 93.4 kg (206 lb).   Weight management plan: Discussed healthy diet and exercise guidelines    Regular exercise  See Patient Instructions    Return in about 3 months (around 12/23/2022) for Routine preventive, with me, in person.    Mary Ann Taylor MD  [facility] EVER Ansari is a 37 year old, presenting for the following health issues:  Recheck Medication      History of Present Illness       Reason for visit:  Discuss sleep problems  Symptom onset:  More than a month  Symptoms include:  Difficulty falling asleep, easily wake up in the middle of night, sensistive to all sound and light, frequent bathroom breaks, dry mouth, restlessness, anxiety  Symptom intensity:  Severe  Symptom progression:  Worsening  Had these symptoms before:  Yes  Has tried/received treatment for these symptoms:  Yes  Previous treatment was successful:  No  What makes it worse:  Not going to bed rekha it worse. Anxiety makes it worse.  What makes it better:  Reduction of caffine and alcohol have helped, meditation, yoga, muscle relaxation, stretches, taking my hervs, tea, consistent bed times    She eats 2-3 servings of fruits and vegetables daily.She consumes 1 sweetened beverage(s) daily.She exercises with enough effort to increase her heart rate 30 to 60 minutes per day.  She exercises with enough effort to increase her heart rate 3 or less days per week.   She is taking medications regularly.     37 yr old , with Hx of situational anxiety, given benzo in 6/2021 but never filled, IBS, mild intermittent asthma on albuterol prn, hx of TMJ, prior Mandibular surgery x 2 as an 19 yr old, hx of plantar warts in 2012, hx of snoring, sleep dsturbances, episodic tension headaches, right shoulder pain, multiple pigmented nevi, eczema, FH of melanoma BMI 28, prior contraception, PE tubes, on Mv, B 12, fish oil, zinc, Calm 50 mg supp and herbs by her herbalist/ " naturopath, intolerant to lactose, tolerates a more gluten reduced diet, allergic to sulfa, (noted rash as a young child),     Seen previously by PCP Case Patrick on 7/15/20 for a physical and referred to the allergist for hx of allergies,   Did evisit 9/3/20 for dysuria & given antibiotics, did e visit nov 2029 for Covid symptoms & test ordered no result in epic noted . Did e visit on 12/9/20 for eczema & advised skin cares & OTC Hydrocortisone ointment prn, video visit done 6/14/21 for situational anxiety, had a therapist was doing acupuncture, self cares, yoga , meditation and CBT, seeing a herbalist, declined a daily med, given lorazepam to use prn & referred to a community psychiatrist to consider gene sight testing.   On 12/26/21 did an e visit for dysuria & given nitrofurantoin, [place]  negative.     Seen 12/31/21 for preventive health and additional concerns. Referral given to the  given family history of colon cancer and melanoma. Mammogram not due till age 41. Pelvic / PAP due in 2022. Health care maintenance reviewed. Discussed vaccines, opted to defer flu shot, consider pneumovax in 2022. Reported had Pfizer Covid April 2 week and then the second shot 3 to 4 weeks later and got her booster beginning of December.   Noted situational anxiety manageable with self cares. Had a therapist. Did not feel need for daily medication & had recently set up to see a psychiatrist. Had not started the benzodiazepines given by another provider previously. Discussed while benzodiazepines very effectively reduce anxiety immediately, they were not recommended for long term use as they could cause more harm than good (memory problems, addiction, and not helping people actually deal with anxiety). They could be addictive, abused, and there were better options for long term management of anxiety, with the most effective combining medication and therapy. Noted was  planning pregnancy & would avoid this medication.  Encouraged to discuss more with her new community psychiatrist when saw them as they would be the best person to assess what med was best for her & could do Gene Sight testing if not done by the  and discuss options. Would recommend her community psychiatrist manage any medications for her.   Counseled that there was no actual test for irritable bowel syndrome, it was a diagnosis of exclusion. Given her history and exam and no concerning findings did not suspect anything alarming going on with her bowels. Discussed the brain gut connection and possibility of having irritable bowel. Self cares and avoiding triggers helped. If avoiding dairy and gluten helped her gut, to continue to do so.     Noted asymptomatic mild intermittent asthma. Albuterol refilled and asthma action plan given in my chart.  Clarified did  not have nasal congestion but may be sleeping with mouth open and some snoring related to TMJ though improved with the mouthguard. A humidifier would help. To try saline spray to the nose at bedtime to decongest nose so could sleep with mouth closed and this could help prevent waking up with a dry mouth  Noted was waking up refreshed. [place] then a sleep evaluation was not needed. Was to try to sleep on side if possible or using a wedge pillow given hx of snoring. & some weight loss could be helpful. BMI 28 and discussed briefly diet, & aerobic exercise     For TMJ and prior mandibular surgery was using a mouthguard. To continue care with her dentist. Tension/pressure headaches likely related to stress and TMJ. Mouthguard helped. To continue with self cares & staying hydrated.      History of dysuria improved. Had done an Evisit few days prior & given nitrofurantoin but had not started as symptoms were improving. Had history of urinary frequency, urgency post menstruation. Recommend when having symptoms to get a urine test done for appropriate antibiotic stewardship, rather than jump to getting an  antibiotic to prevent resistance and super gut infections. To void after intercourse, avoid underwear at night, avoid tight clothing, reduce detergent or increase water in wash cycle when cleaning underwear to prevent chemical irritation. Wet prep and UA repeated turned out normal.   Noted normal physiologic midcycle Vaginal discharge       Had family history of melanoma, multiple moles looked benign on exam & Referral given to dermatology for mole mapping skin check.  History of eczema went along with the triad of allergies and asthma. Cold weather and long hot showers could make it worse. Recommended lubricating skin well with a good emollient like  Aquaphor, Sarna, cerave or Eucerin, applying to skin immediately after shower to lock in moisture and may use over-the-counter hydrocortisone ointment to any flare ups sparingly. Could also discuss further with dermatology when saw them.     Was planning to get pregnant. Recommended starting prenatal vitamin with folic acid at least 0.4 mg and avoiding alcohol for at least 3 months prior to pregnancy. If after active trying during peak ovulation time & 6 months go by and still struggling to conceive can refer to OB    Labs came back showing mildly elevated LDL, normal CMP, TSH, Hep c, HIV, UA & UCx neg, UA showed some ketones from fasting. Wet prep and cbc were normal.     Referred for TMJ t PT of her choice. & to gyn for preconception counseling in 2 to 4/2022.   E visit done with Dr Yepez 8/9/22 for insomnia.     CURRENTLY  Virtual apt made  To discuss insomnia.  This summer started trying to not be on her usual herbs taken for sleep in anticipation of not being able to use them when pregnant. It has been very difficult off them. Has had   lots of disturbances at night time routine as  has a different schedule & worked with puppy again with improvement in kenneling puppy at night. Has always been very sensitive to light and sound and movement at night. Met  with psychiatrist & med options given and the one recommended was not recommended to be continued when pregnant. Hoping to get pregnant this fall and just would like to know her options so can be mentally prepared has never taken meds OTC or prescription before, more used herbal remedies in the past. Wondering if needs to see another psyche or gyn etc.     Can fall asleep fine as long as it is quiet and dark. Tries to go to bed the same time. But not always able to do so as lives with another human and can only do so much to control her . Sleeping in two different bedrooms is not conducive to newly  life and typically has never taken any med for anxiety in the past. Has anxiety. Wakes up and cant go back to sleep. Mind is racing. Has emotional dysregulation. The difficulty with sleep may contribute to anxiety too as knows poor sleep can affect getting pregnant too. Working with her thoughts with her therapist and doing her yoga and meditation but feels would be nice to have something to take when things are bad but not every day. Has tried melatonin when Covid first started and was very anxious not sure what best dose is do her. Felt then was a short term option when was very anxious does not know if can continue this long term or not.  Has already stopped coffee and alcohol earlier this year and it all makes a difference but still struggling with sleep at times.   Is curious if something hormonal is gong on  Will be meeting with a nutritionist & review her diet   Her herbalist/ naturopath did tell her the herbs she had bene on are only effective for 4 hrs or so has resumed taking them for now with plans to stop once pregnant and a short term solution   Has anxiety mostly at night but during the day is not anxious. Notes is doing DBT right now & learning a lot of skills  Would be open to a sleep evaluation.     Review of Systems   Constitutional, HEENT, cardiovascular, pulmonary, GI, ,  musculoskeletal, neuro, skin, endocrine and psych systems are negative, except as otherwise noted.      Objective    Vitals - Patient Reported  Weight (Patient Reported): 93 kg (205 lb)      Vitals:  No vitals were obtained today due to virtual visit.    Physical Exam   GENERAL: alert and no distress  RESP: No audible wheeze, cough, or visible cyanosis.   NEURO: Mentation and speech appropriate for age.  PSYCH: mentation appears normal, anxious and judgement and insight intact    No results found for any visits on 09/23/22.          Video-Visit Details    Video Start Time: 1539    Type of service:  Video Visit    Video End Time:1634    Originating Location (pt. Location): Home    Distant Location (provider location):  [facility]     Platform used for Video Visit: Unable to complete video visit ended up doing a phone visit

## 2022-09-23 NOTE — PATIENT INSTRUCTIONS
Thank you for your patience  Insomnia, trouble falling asleep, snoring etc discussed  Continue with sleep hygiene as you are doing best you can   Options discussed  Ok to continue herbals for now and stop when pregnant or if advised otherwise by gyn in future  Melatonin is safe to take long term sent in 3 mg at bedtime to use as needed, may increase to 6 mg bedtime if need be  Sent in unisom 25 mg may use safely now and during pregnancy as needed  Referred to sleep for a sleep specialist and sleep psychologist ( sleep CBT)   Continue with your therapist   Maybe the sleep psychologist can suggest who you can see for a psychiatrist but continue with your current one till then  Continue with self management cares as you are doing for anxiety as that can affect sleep   Can refer to gyn when your pregnant about next steps  Seen you in dec for a physical and pap and can get flu and new covid bivalent omicron vaccine anytime before that   We can do labs at physical but fact your menstruating suggest no hormonal issues     Also included some resources below    General recommendations for sleep problems (Insomnia)  Allow 2-4 weeks to see results     Establish a regular sleep schedule   Go to bed at same time each night, get up at same time each day, avoid sleeping-in on weekends.  Cut down time in bed (if not asleep, get up, go in other room, do something calming and boring such as sorting papers, making warm milk, knitting, dusting)   Avoid trying to force yourself to sleep.  Use your bed only for sleep. Do not read or watch Television in bed.     Make the bedroom comfortable  Keepthe temperature in your bedroom comfortable, keep bedroom quiet when sleeping, and consider ear plugs (silicon) especially if you have partner who snores.  Keep bedroom dark enough:  use dark blinds or wear an eye mask if needed.    Relax at bedtime.    Do not watch tv or play video games or surf on computer right before bed; the full spectrum  light actually stimulates your brain!  Relax each muscle group individually ; begin with your feet, work toward your head. Deal with your worries before bedtime by setting aside a worry time for 30 minutes earlier or journal your thoughts.  Listen to relaxation tapes such as Classical Music or Nature sounds or imagine a tranquil scene (e.g. waterfall or beach)   Back Massage : Gentle 5-minute back rub prior to bedtime can help relax you.    Perform measures to make you tired at bedtime.   Get regular Exercise each day (at least 6 hours before bedtime)   Take medications only as directed   Eat a light bedtime snack or warm drink, such as milk or herbal tea (non-caffeinated)   No Napping during day     Stimulus Control   Do not lie awake for more than 30 minutes.   Get out of bed and perform a quiet activity, then return to bed when sleepy.  Repeat as many times per night as needed     Things to avoid   Do not Exercise just before bedtime   No overstimulating activities just before bed, such as competitive games or exciting Television programs  Avoid caffeine (coffee, tea, soda), chocolate after lunchtime   Do not use alcohol to induce sleep (worsens Insomnia)   Do not take someone else's sleeping pills   Do not look at the clock when awakening   Do not turn on light when getting up to use bathroom     Instructions for treating Delayed Sleep Phase Syndrome:    Delayed Sleep Phase Syndrome (DSPS) means that your body's internal timing is set late compared to the 24 hour day. Therefore, it is often difficult to get up on time for work in the morning and sometimes difficult to fall asleep on time, in order to get enough sleep. People with DSPS often tend to like to stay up late on weekends and sleep in until between 10 AM and noon, sometimes even later.This is actually a bad habit that will perpetuate the problem. It reinforces your body's tendency to be on that later schedule.    You should go to bed when you are sleepy  and ready to sleep. During this entire process, you should not engage in activities that may make it worse, such as watching TV in bed, leaving the TV on all night, drinking any caffeine 6 hours before bed or exercising 1-2 hours before bed.     Start taking Melatonin, 1 mg tablet 3-5 hours before the time that you fall asleep on average (not your desired bedtime or time that you get in bed, but the time you normally fall asleep on your own).      Upon awakening, get exposure to sun-light for about 30-45 minutes. You do not need to look at the sun, in fact, this is dangerous. Reading the paper with the sun shining on you is adequate.  Alternatively, you may use a Seasonal Affective Disorder Lamp (intensity 10,000 Lux) instead of the sun. The lamp should be positioned 1-2 arms lengths away from you. They lamps are sold at [facility] such as [employer] or [employer]. A prescription can be written to get insurance coverage in some cases. They are also sold on [employer].com.    Using the light and melatonin should help march your internal clock (known as your circadian rhythms) gradually earlier. As your bedtime advances, remember to take your melatonin earlier, keeping it 5 hours before your fall asleep time.    Avoid naps and sleeping in because sleeping during the day will delay your body's clock and you will have to start from scratch.     More information about light therapy:  If the cost of any light box is too much, you can also purchase a compact fluorescent all spectrum light bulb at a local hardware store for about $8.  The most commonly available bulb is 1400 lumen.  You would need two of these positioned within 1 meter of yourself to be equivalent to 2,500 lux.  The bulbs can be placed in a standard light fixture.  Additionally, they can be placed in a mountable fixture that is used in greenhouses.  Mountable fixtures are also available at hardware stores for about $9.  Do not look  directly at the light.  If you have any concerns regarding the safety of bright light therapy for you, it is recommended that you consult an ophthalmologist before using a light box.  If you have a condition that makes your eyes very sensitive to light, macular degeneration, a family history of such problems, or diabetic changes to your eyes, consult an ophthalmologist before using a light box. If you have anxiety disorder and have an increase in anxiety discontinue use.  Make a relaxing routine prior to bedtime  Relaxation exercises:              Progressive muscle relaxation: Relax each muscle group individually                Begin with your feet, flex, then relax. Try to imagine your feet feeling heavy and sinking into the bed. Move to your calves, do the same thing. Work through each muscle group toward your head.              Relaxing Mental Imagery: Try to imagine a trip that you took and found relaxing, or imagine a day at the beach. Try to walk yourself through the day in your mind as if you were dreaming it. Try to imagine sensing the different experiences, such as feeling sand between your toes, the heat of the sun on your skin, seeing the waves crashing the shore, the smell of the salt water, etc.     Deal with your worries before bedtime                Set aside a worry time around dinner time for 10-15 minutes. Write down the                things that are on your mind. Plan time in the coming days to address those                issues. Brainstorm ideas on how you will deal with them. Try to identify issues                that are out of your control, and try to let those issues go.  Listen to relaxation tapes    Classical Music or Nature sounds    Back Massage    Get regular exercise each day (at least 1-2 hours before bedtime)    Take medications only as directed    Eat a light bedtime snack or warm drink    Warm milk    Warm herbal tea (non-caffeinated)       Things to avoid    No overstimulating  activities just before bed    No competitive games before bedtime    No exciting television programs before bedtime    Avoid caffeine after lunchtime    Avoid chocolate    Do not use alcohol to induce sleep (worsens Insomnia)    Do not take someone else's sleeping pills    Do not look at the clock when awakening    Do not turn on light when getting up to use bathroom, use a nightlight     Online Programs   www.SHUTi.me (pronounced shut eye). There is a fee for this program. Enter the code  [facility]  if you decide to enroll in this program.      www.[employer].com (pronounced sleep ee oh). There is a fee for this program. Enter the code  [facility]  if you decide to enroll in this program.    Suggested Resources  Insomnia Treatment Books    Overcoming Insomnia by Fernando Cervantes and Marylin Michel (2008)  No More Sleepless Nights by Tigre Matos and Gillian Clark (1996)  Say Kelsy to Insomnia by Mauricio Landaverde (2009)  The Insomnia Workbook by Sita Nguyễn and Cassius Willis (2009)  The Insomnia Answer by Brandon Vieira and Dank Harvey (2006)      Stress Management and Relaxation Books  The Relaxation and Stress Reduction Workbook by Katerine Vargas and Zelalem Napier (2008)  Stress Management Workbook: Techniques and Self-Assessment Procedures by Brunilda Dexter and Joby Cruz (1997)  A Mindfulness-Based Stress Reduction Workbook by Roland Ramirez and Samira Waldron (2010)  The Complete Stress Management Workbook by Juan Tijerina, Esteban Adams and Juan Garcia (1996)  Assert Yourself by Stacy Adhikari and Dash Adhikari (1977)    Relaxation Resources for Computer Download   These websites offer resources to help you relax. This list is for information only. [facility] is not responsible for the quality of services or the actions of any person or organization.  Progressive Muscle Relaxation (PMR):     http://www.[employer]/progressive-muscle-relaxation-exercise.html  http://studentsupport.[facility]/counseling/resources/self-help/relaxation-and-stress-management/  Deep Breathing Exercises:  http://www.[employer]/breathing-awareness.html    Meditation:       www.[employer]  www.[employer]itation-site.com You may have to pay for some of these resources.    Guided Imagery:  http://www.[employer]/guided-imagery-scripts.html    http://[employer]/library/kswbizdmbz-fraeku-kziuxyt/    Counseling / Behavioral Health  [facility]  Visit www.[email or web address].org or call [phone] to find a clinic close to you.  Or call [phone] for [facility].

## 2022-09-28 PROBLEM — R51.9 NONINTRACTABLE EPISODIC HEADACHE, UNSPECIFIED HEADACHE TYPE: Status: RESOLVED | Noted: 2022-01-01 | Resolved: 2022-09-28

## 2022-10-05 ENCOUNTER — MYC MEDICAL ADVICE (OUTPATIENT)
Dept: FAMILY MEDICINE | Facility: CLINIC | Age: 37
End: 2022-10-05

## 2022-10-06 ENCOUNTER — THERAPY VISIT (OUTPATIENT)
Dept: PHYSICAL THERAPY | Facility: CLINIC | Age: 37
End: 2022-10-06
Payer: COMMERCIAL

## 2022-10-06 DIAGNOSIS — M25.511 SHOULDER PAIN, RIGHT: Primary | ICD-10-CM

## 2022-10-06 PROCEDURE — 97110 THERAPEUTIC EXERCISES: CPT | Mod: GP | Performed by: PHYSICAL THERAPIST

## 2022-10-06 NOTE — TELEPHONE ENCOUNTER
I will ask Charlie Avila if he knows of any resources for sleep CBT to give to the patient     Usually the sleep center would have options too. I thought I sent a referral to them as well. Was it there they told her they dont have one?

## 2022-10-06 NOTE — TELEPHONE ENCOUNTER
Inquired if pt reached out to sleep center    MADDIE BenjaminN RN  [facility]

## 2022-10-06 NOTE — TELEPHONE ENCOUNTER
Dr Taylor- pt tried to schedule with sleep psychologist and they told her they do not have this    Is there another psychology clinic you know of that would have this?    MADDIE BenjaminN RN  [facility]

## 2022-10-07 ENCOUNTER — TELEPHONE (OUTPATIENT)
Dept: BEHAVIORAL HEALTH | Facility: CLINIC | Age: 37
End: 2022-10-07

## 2022-10-07 NOTE — TELEPHONE ENCOUNTER
Dr. Taylor-     Pt scheduled with sleep specialist in early 2023, just not the sleep psychiatrist    Estrella Helm, BSN RN  [facility]

## 2022-10-07 NOTE — TELEPHONE ENCOUNTER
Patient's PCP was requesting support from the [facility] to address patient's insomnia.  According to the notes, an order was placed for sleep psychologist but it seems it did not follow through.    [facility] contacted patient by phone and that the voicemail of his role, that he is trained in CBT-I encouraged patient to contact central scheduling or the clinic to schedule initial visit.  Noted from chart the patient has a sleep study in January 24, 2023

## 2022-10-09 ENCOUNTER — HEALTH MAINTENANCE LETTER (OUTPATIENT)
Age: 37
End: 2022-10-09

## 2022-10-27 ENCOUNTER — THERAPY VISIT (OUTPATIENT)
Dept: PHYSICAL THERAPY | Facility: CLINIC | Age: 37
End: 2022-10-27
Payer: COMMERCIAL

## 2022-10-27 DIAGNOSIS — M25.511 SHOULDER PAIN, RIGHT: Primary | ICD-10-CM

## 2022-10-27 PROCEDURE — 97112 NEUROMUSCULAR REEDUCATION: CPT | Mod: GP | Performed by: PHYSICAL THERAPIST

## 2022-10-27 PROCEDURE — 97110 THERAPEUTIC EXERCISES: CPT | Mod: GP | Performed by: PHYSICAL THERAPIST

## 2022-11-23 ENCOUNTER — MYC MEDICAL ADVICE (OUTPATIENT)
Dept: FAMILY MEDICINE | Facility: CLINIC | Age: 37
End: 2022-11-23

## 2022-11-29 NOTE — TELEPHONE ENCOUNTER
Pneumonia vaccine is recommended given history of asthma.  May get Prevnar 20.  Other vaccines to consider would be the flu shot, COVID booster and hepatitis B vaccination if never immunized with this

## 2022-11-29 NOTE — TELEPHONE ENCOUNTER
Dr. Taylor-     See pts myChart, I do not see she is due for pneumonia vaccine?    Estrella Helm, BSN RN  [facility]

## 2022-12-06 ASSESSMENT — ENCOUNTER SYMPTOMS
NAUSEA: 0
BREAST MASS: 0
ARTHRALGIAS: 0
CONSTIPATION: 0
EYE PAIN: 0
PALPITATIONS: 0
DYSURIA: 0
WEAKNESS: 0
HEMATOCHEZIA: 0
DIZZINESS: 0
HEADACHES: 0
PARESTHESIAS: 0
SHORTNESS OF BREATH: 0
NERVOUS/ANXIOUS: 1
COUGH: 0
SORE THROAT: 0
JOINT SWELLING: 0
FEVER: 0
HEARTBURN: 0
CHILLS: 0
MYALGIAS: 1
FREQUENCY: 0
ABDOMINAL PAIN: 0
HEMATURIA: 0
DIARRHEA: 0

## 2022-12-12 ENCOUNTER — MYC MEDICAL ADVICE (OUTPATIENT)
Dept: FAMILY MEDICINE | Facility: CLINIC | Age: 37
End: 2022-12-12

## 2022-12-12 ENCOUNTER — OFFICE VISIT (OUTPATIENT)
Dept: FAMILY MEDICINE | Facility: CLINIC | Age: 37
End: 2022-12-12
Payer: COMMERCIAL

## 2022-12-12 VITALS
TEMPERATURE: 97.8 F | BODY MASS INDEX: 28.84 KG/M2 | HEIGHT: 71 IN | SYSTOLIC BLOOD PRESSURE: 89 MMHG | DIASTOLIC BLOOD PRESSURE: 63 MMHG | RESPIRATION RATE: 12 BRPM | WEIGHT: 206 LBS | HEART RATE: 97 BPM | OXYGEN SATURATION: 96 %

## 2022-12-12 DIAGNOSIS — G47.9 SLEEP DISORDER: ICD-10-CM

## 2022-12-12 DIAGNOSIS — Z23 NEED FOR PROPHYLACTIC VACCINATION AND INOCULATION AGAINST INFLUENZA: ICD-10-CM

## 2022-12-12 DIAGNOSIS — Z00.00 HEALTH CARE MAINTENANCE: ICD-10-CM

## 2022-12-12 DIAGNOSIS — Z23 NEED FOR HEPATITIS B VACCINATION: ICD-10-CM

## 2022-12-12 DIAGNOSIS — Z13.1 SCREENING FOR DIABETES MELLITUS: ICD-10-CM

## 2022-12-12 DIAGNOSIS — Z80.8 FAMILY HISTORY OF MELANOMA: ICD-10-CM

## 2022-12-12 DIAGNOSIS — R06.83 SNORING: ICD-10-CM

## 2022-12-12 DIAGNOSIS — L30.9 ECZEMA, UNSPECIFIED TYPE: ICD-10-CM

## 2022-12-12 DIAGNOSIS — K58.9 IRRITABLE BOWEL SYNDROME, UNSPECIFIED TYPE: ICD-10-CM

## 2022-12-12 DIAGNOSIS — M25.511 RIGHT SHOULDER PAIN, UNSPECIFIED CHRONICITY: ICD-10-CM

## 2022-12-12 DIAGNOSIS — J45.20 MILD INTERMITTENT ASTHMA WITHOUT COMPLICATION: ICD-10-CM

## 2022-12-12 DIAGNOSIS — Z91.09 ENVIRONMENTAL ALLERGIES: ICD-10-CM

## 2022-12-12 DIAGNOSIS — Z23 NEED FOR COVID-19 VACCINE: ICD-10-CM

## 2022-12-12 DIAGNOSIS — M26.609 TMJ (TEMPOROMANDIBULAR JOINT SYNDROME): ICD-10-CM

## 2022-12-12 DIAGNOSIS — Z23 NEED FOR PNEUMOCOCCAL VACCINE: ICD-10-CM

## 2022-12-12 DIAGNOSIS — G44.219 EPISODIC TENSION-TYPE HEADACHE, NOT INTRACTABLE: ICD-10-CM

## 2022-12-12 DIAGNOSIS — Z00.00 ROUTINE HISTORY AND PHYSICAL EXAMINATION OF ADULT: Primary | ICD-10-CM

## 2022-12-12 DIAGNOSIS — F41.9 ANXIETY: ICD-10-CM

## 2022-12-12 DIAGNOSIS — Z71.89 ADVANCED DIRECTIVES, COUNSELING/DISCUSSION: ICD-10-CM

## 2022-12-12 DIAGNOSIS — D22.9 MULTIPLE PIGMENTED NEVI: ICD-10-CM

## 2022-12-12 DIAGNOSIS — N89.8 VAGINAL ITCHING: ICD-10-CM

## 2022-12-12 DIAGNOSIS — Z01.84 IMMUNITY STATUS TESTING: ICD-10-CM

## 2022-12-12 DIAGNOSIS — E78.00 ELEVATED LDL CHOLESTEROL LEVEL: ICD-10-CM

## 2022-12-12 LAB
ALBUMIN SERPL BCG-MCNC: 4.2 G/DL (ref 3.5–5.2)
ALP SERPL-CCNC: 36 U/L (ref 35–104)
ALT SERPL W P-5'-P-CCNC: 8 U/L (ref 10–35)
ANION GAP SERPL CALCULATED.3IONS-SCNC: 8 MMOL/L (ref 7–15)
AST SERPL W P-5'-P-CCNC: 20 U/L (ref 10–35)
BASOPHILS # BLD AUTO: 0.1 10E3/UL (ref 0–0.2)
BASOPHILS NFR BLD AUTO: 1 %
BILIRUB SERPL-MCNC: 0.3 MG/DL
BUN SERPL-MCNC: 11 MG/DL (ref 6–20)
CALCIUM SERPL-MCNC: 9.1 MG/DL (ref 8.6–10)
CHLORIDE SERPL-SCNC: 105 MMOL/L (ref 98–107)
CHOLEST SERPL-MCNC: 226 MG/DL
CLUE CELLS: ABNORMAL
CREAT SERPL-MCNC: 0.97 MG/DL (ref 0.51–0.95)
DEPRECATED HCO3 PLAS-SCNC: 27 MMOL/L (ref 22–29)
EOSINOPHIL # BLD AUTO: 0.2 10E3/UL (ref 0–0.7)
EOSINOPHIL NFR BLD AUTO: 3 %
ERYTHROCYTE [DISTWIDTH] IN BLOOD BY AUTOMATED COUNT: 13.9 % (ref 10–15)
GFR SERPL CREATININE-BSD FRML MDRD: 77 ML/MIN/1.73M2
GLUCOSE SERPL-MCNC: 95 MG/DL (ref 70–99)
HBA1C MFR BLD: 5.4 % (ref 0–5.6)
HBV SURFACE AB SERPL IA-ACNC: 205.43 M[IU]/ML
HBV SURFACE AB SERPL IA-ACNC: REACTIVE M[IU]/ML
HBV SURFACE AG SERPL QL IA: NONREACTIVE
HCT VFR BLD AUTO: 39.4 % (ref 35–47)
HDLC SERPL-MCNC: 47 MG/DL
HGB BLD-MCNC: 12.7 G/DL (ref 11.7–15.7)
IMM GRANULOCYTES # BLD: 0 10E3/UL
IMM GRANULOCYTES NFR BLD: 0 %
LDLC SERPL CALC-MCNC: 164 MG/DL
LYMPHOCYTES # BLD AUTO: 1.2 10E3/UL (ref 0.8–5.3)
LYMPHOCYTES NFR BLD AUTO: 21 %
MCH RBC QN AUTO: 28.3 PG (ref 26.5–33)
MCHC RBC AUTO-ENTMCNC: 32.2 G/DL (ref 31.5–36.5)
MCV RBC AUTO: 88 FL (ref 78–100)
MONOCYTES # BLD AUTO: 0.4 10E3/UL (ref 0–1.3)
MONOCYTES NFR BLD AUTO: 7 %
NEUTROPHILS # BLD AUTO: 4.1 10E3/UL (ref 1.6–8.3)
NEUTROPHILS NFR BLD AUTO: 68 %
NONHDLC SERPL-MCNC: 179 MG/DL
PLATELET # BLD AUTO: 209 10E3/UL (ref 150–450)
POTASSIUM SERPL-SCNC: 4.4 MMOL/L (ref 3.4–5.3)
PROT SERPL-MCNC: 6.9 G/DL (ref 6.4–8.3)
RBC # BLD AUTO: 4.49 10E6/UL (ref 3.8–5.2)
SODIUM SERPL-SCNC: 140 MMOL/L (ref 136–145)
TRICHOMONAS, WET PREP: ABNORMAL
TRIGL SERPL-MCNC: 77 MG/DL
TSH SERPL DL<=0.005 MIU/L-ACNC: 1.22 UIU/ML (ref 0.3–4.2)
WBC # BLD AUTO: 6 10E3/UL (ref 4–11)
WBC'S/HIGH POWER FIELD, WET PREP: ABNORMAL
YEAST, WET PREP: ABNORMAL

## 2022-12-12 PROCEDURE — 99395 PREV VISIT EST AGE 18-39: CPT | Mod: 25 | Performed by: FAMILY MEDICINE

## 2022-12-12 PROCEDURE — 80061 LIPID PANEL: CPT | Performed by: FAMILY MEDICINE

## 2022-12-12 PROCEDURE — 99214 OFFICE O/P EST MOD 30 MIN: CPT | Mod: 25 | Performed by: FAMILY MEDICINE

## 2022-12-12 PROCEDURE — 86003 ALLG SPEC IGE CRUDE XTRC EA: CPT | Performed by: FAMILY MEDICINE

## 2022-12-12 PROCEDURE — G0145 SCR C/V CYTO,THINLAYER,RESCR: HCPCS | Performed by: FAMILY MEDICINE

## 2022-12-12 PROCEDURE — 87340 HEPATITIS B SURFACE AG IA: CPT | Performed by: FAMILY MEDICINE

## 2022-12-12 PROCEDURE — 87624 HPV HI-RISK TYP POOLED RSLT: CPT | Performed by: FAMILY MEDICINE

## 2022-12-12 PROCEDURE — 90471 IMMUNIZATION ADMIN: CPT | Performed by: FAMILY MEDICINE

## 2022-12-12 PROCEDURE — 86706 HEP B SURFACE ANTIBODY: CPT | Performed by: FAMILY MEDICINE

## 2022-12-12 PROCEDURE — 90677 PCV20 VACCINE IM: CPT | Performed by: FAMILY MEDICINE

## 2022-12-12 PROCEDURE — 87210 SMEAR WET MOUNT SALINE/INK: CPT | Performed by: FAMILY MEDICINE

## 2022-12-12 PROCEDURE — 82785 ASSAY OF IGE: CPT | Performed by: FAMILY MEDICINE

## 2022-12-12 PROCEDURE — 36415 COLL VENOUS BLD VENIPUNCTURE: CPT | Performed by: FAMILY MEDICINE

## 2022-12-12 PROCEDURE — 80050 GENERAL HEALTH PANEL: CPT | Performed by: FAMILY MEDICINE

## 2022-12-12 PROCEDURE — 83036 HEMOGLOBIN GLYCOSYLATED A1C: CPT | Performed by: FAMILY MEDICINE

## 2022-12-12 RX ORDER — ALBUTEROL SULFATE 90 UG/1
2 AEROSOL, METERED RESPIRATORY (INHALATION) EVERY 6 HOURS PRN
Qty: 18 G | Refills: 1 | Status: SHIPPED | OUTPATIENT
Start: 2022-12-12 | End: 2024-03-28

## 2022-12-12 ASSESSMENT — ENCOUNTER SYMPTOMS
WEAKNESS: 0
SHORTNESS OF BREATH: 0
SORE THROAT: 0
HEMATURIA: 0
NAUSEA: 0
DIZZINESS: 0
ABDOMINAL PAIN: 0
DIARRHEA: 0
ARTHRALGIAS: 0
PALPITATIONS: 0
HEMATOCHEZIA: 0
NERVOUS/ANXIOUS: 1
HEADACHES: 0
MYALGIAS: 1
HEARTBURN: 0
CHILLS: 0
EYE PAIN: 0
CONSTIPATION: 0
FEVER: 0
FREQUENCY: 0
BREAST MASS: 0
DYSURIA: 0
JOINT SWELLING: 0
COUGH: 0
PARESTHESIAS: 0

## 2022-12-12 ASSESSMENT — PAIN SCALES - GENERAL: PAINLEVEL: NO PAIN (0)

## 2022-12-12 NOTE — PROGRESS NOTES
SUBJECTIVE:   CC: Coty is an 37 year old who presents for preventive health visit.   Patient has been advised of split billing requirements and indicates understanding: Yes  Healthy Habits:     Getting at least 3 servings of Calcium per day:  Yes    Bi-annual eye exam:  Yes    Dental care twice a year:  Yes    Sleep apnea or symptoms of sleep apnea:  None    Diet:  Gluten-free/reduced and Other    Frequency of exercise:  2-3 days/week    Duration of exercise:  30-45 minutes    Taking medications regularly:  Yes    Medication side effects:  Not applicable    PHQ-2 Total Score: 0    Additional concerns today:  Yes    BACKGROUND  37 yr old , with Hx of situational anxiety, given benzo in 6/2021 but never filled, IBS, mild intermittent asthma on albuterol prn, hx of TMJ, prior Mandibular surgery x 2 as an 19 yr old, hx of plantar warts in 2012, hx of snoring, sleep disturbances, episodic tension headaches, right shoulder pain, multiple pigmented nevi, eczema, FH of melanoma BMI 28, prior contraception, PE tubes, on Mv, B 12, fish oil, zinc, Calm 50 mg supp and herbs by her herbalist/ naturopath, melatonin and Unisom to use as needed once completes this supplement and when pregnant, intolerant to lactose, tolerates a more gluten reduced diet, allergic to sulfa, (noted rash as a young child),   Seen previously by PCP Case Patrick on 7/15/20 for a physical and referred to the allergist for hx of allergies,   Did evisit 9/3/20 for dysuria & given antibiotics, did e visit nov 2029 for Covid symptoms & test ordered no result in epic noted . Did e visit on 12/9/20 for eczema & advised skin cares & OTC Hydrocortisone ointment prn, video visit done 6/14/21 for situational anxiety, had a therapist was doing acupuncture, self cares, yoga , meditation and CBT, seeing a herbalist, declined a daily med, given lorazepam to use prn & referred to a community psychiatrist to consider gene sight testing.   On 12/26/21 did an e visit  for dysuria & given nitrofurantoin, [place]  negative.   Seen 12/31/21 for preventive health and additional concerns. Referral given to the  given family history of colon cancer and melanoma. Mammogram not due till age 41. Pelvic / PAP due in 2022. Health care maintenance reviewed. Discussed vaccines, opted to defer flu shot, consider pneumovax in 2022. Reported had Pfizer Covid April 2 week and then the second shot 3 to 4 weeks later and got her booster beginning of December.   Noted situational anxiety manageable with self cares. Had a therapist. Did not feel need for daily medication & had recently set up to see a psychiatrist. Had not started the benzodiazepines given by another provider previously. Discussed while benzodiazepines very effectively reduce anxiety immediately, they were not recommended for long term use as they could cause more harm than good (memory problems, addiction, and not helping people actually deal with anxiety). They could be addictive, abused, and there were better options for long term management of anxiety, with the most effective combining medication and therapy. Noted was  planning pregnancy & would avoid this medication. Encouraged to discuss more with her new community psychiatrist when saw them as they would be the best person to assess what med was best for her & could do Gene Sight testing if not done by the  and discuss options. Would recommend her community psychiatrist manage any medications for her.   Counseled that there was no actual test for irritable bowel syndrome, it was a diagnosis of exclusion. Given her history and exam and no concerning findings did not suspect anything alarming going on with her bowels. Discussed the brain gut connection and possibility of having irritable bowel. Self cares and avoiding triggers helped. If avoiding dairy and gluten helped her gut, to continue to do so.  Noted asymptomatic mild intermittent asthma. Albuterol  refilled and asthma action plan given in my chart.  Clarified did  not have nasal congestion but may be sleeping with mouth open and some snoring related to TMJ though improved with the mouthguard. A humidifier would help. To try saline spray to the nose at bedtime to decongest nose so could sleep with mouth closed and this could help prevent waking up with a dry mouth  Noted was waking up refreshed. Conrad then a sleep evaluation was not needed. Was to try to sleep on side if possible or using a wedge pillow given hx of snoring. & some weight loss could be helpful. BMI 28 and discussed briefly diet, & aerobic exercise  For TMJ and prior mandibular surgery was using a mouthguard. To continue care with her dentist. Tension/pressure headaches likely related to stress and TMJ. Mouthguard helped. To continue with self cares & staying hydrated.   History of dysuria improved. Had done an Evisit few days prior & given nitrofurantoin but had not started as symptoms were improving. Had history of urinary frequency, urgency post menstruation. Recommend when having symptoms to get a urine test done for appropriate antibiotic stewardship, rather than jump to getting an antibiotic to prevent resistance and super gut infections. To void after intercourse, avoid underwear at night, avoid tight clothing, reduce detergent or increase water in wash cycle when cleaning underwear to prevent chemical irritation. Wet prep and UA repeated turned out normal.   Noted normal physiologic midcycle Vaginal discharge    Had family history of melanoma, multiple moles looked benign on exam & Referral given to dermatology for mole mapping skin check.  History of eczema went along with the triad of allergies and asthma. Cold weather and long hot showers could make it worse. Recommended lubricating skin well with a good emollient like  Aquaphor, Sarna, cerave or Eucerin, applying to skin immediately after shower to lock in moisture and may use  over-the-counter hydrocortisone ointment to any flare ups sparingly. Could also discuss further with dermatology when saw them.  Was planning to get pregnant. Recommended starting prenatal vitamin with folic acid at least 0.4 mg and avoiding alcohol for at least 3 months prior to pregnancy. If after active trying during peak ovulation time & 6 months go by and still struggling to conceive can refer to OB  Labs came back showing mildly elevated LDL, normal CMP, TSH, Hep c, HIV, UA & UCx neg, UA showed some ketones from fasting. Wet prep and cbc were normal.   Referred for TMJ t PT of her choice. & to gyn for preconception counseling in 2 to 4/2022.   E visit done with Dr Yepez 8/9/22 for insomnia.    Seen 9/23/22 virtually for insomnia, trouble falling asleep, snoring , anxiety etc discussed. Was to continue with sleep hygiene as doing. Options discussed. Ok to continue herbals for now and stop when pregnant or if advised otherwise by gyn in future. Melatonin was safe to take long term & sent in 3 mg at bedtime to use as needed, could increase to 6 mg bedtime if need be. also sent in Unisom 25 mg to use safely now and during pregnancy as needed. Referred to sleep for a sleep specialist and sleep psychologist ( sleep CBT) & was to continue with her individual  therapist for DBT. Maybe the sleep psychologist could suggest she could see for a psychiatrist but to continue with current one till then. Was to continue with self management cares as doing for anxiety as that could affect sleep. Could refer to gyn when pregnant about next steps. Was to return in dec for an in person physical and pap and can get flu and new Covid bivalent omicron vaccine anytime before that. Counseled that since she was menstruating regularly it suggested no hormonal issues.    CURRENTLY  Here for a physical  No fh of breast, ovarian or colon cancer  Mom did genetic testing was neg  Pap due  LMP 11/30/22    Vaginal itching no risk of std, Will  do wet prep  Sister with a huge fibroid, she herself has no menstrual issues, cycles are regular     Sick early nov 1 week, recovered, Allergies starting up, wondering about getting an allergy panel done. Avoids lactose, has a hx of gi intolerance, wondering about being tested for it. Discussed a simpler solution dn cheaper would be to avoid dairy if causing symptoms and taking lactaid if consuming dairy. Would be interested to do resp allergy panel. Feels has environmental allergies and runs in the family. Will hold off on seeing an allergist for now.    Asthma mild, needs a new albuterol inhaler. In winter asthma acts up when walking in cold, so far not had to use. ACT=23. Recent cold in nov but neg for covid     Anxiety survived layoff but many colleagues leaving, Holiday  Stress,  leaving for [employer] as father in law is being worked up for intestinal cancer & there are many unknowns yet. Has been doing yoga, working with a nutritionist. Has an individual Counsellor, who he meets with monthly.  DBT skills helping.  Nervous as it impacts sleep and then worried about getting pregnant with lack of sleep.  Has a lot of health anxiety.    History of sleep disorder/snoring.  Now to see sleep in March.  Unable to get in anywhere else earlier.  Using magnesium supplement and has melatonin and Unisom to use as needed in the future but has not required that yet.      BMI 29   History of elevated LDL.  We will do lab work today.    Tension headaches going away. Does lots of massage and yoga. Looking forward to beng preg so that acupuncture is covered  Does acupuncture monthly.  Sits all day in front of the computer and trying to pay attention to better posture which also helps.  Wearing the retained for her TMJ helps.  She thinks it is due to TMJ versus dehydration and stress.  Has been trying to keep well-hydrated.    TMJ wears a mouthguard nightly and it helps.  Thinking about doing physical therapy for the jaw once  her shoulder is better.    Right shoulder is improved with physical therapy had to cancel appointments in November due to being ill and work stress but has found them helpful and that helps her sleep at night.    Irritable bowel syndrome has improved with dietary changes and stopped drinking alcohol and eating less sugar.  Is thinking about a liver cleanse and wondering about my thoughts on it.    Eczema currently doing well.  Preparing for to get bad as the winter progresses.  Has lotions to use at home and applies on the hands where it mostly affects her.    Has multiple pigmented nevi [facility] given family history of melanoma & Has apt in feb. Has a couple moles behind neck and underwear line on abdomen she would like checked today    HM reviewed  No ACP on file, honoring choices given  To review, had been planning to put together something when preg for the delivery  Got flu shot 11/23  Covid pfizer booster 11/23  Not had pneumonia vaccine. Will do Prevnar 20 today given asthma hx.   Thinks has had hep B vaccine in [place] [facility]  May be on an  old vaccine card from 208, will look for it. Ok to do titers today to check immune status for hep B.   Will do labs today   Been taking prenatal vitamins with DHA but each dosing consists of 4 different tablets 1 of which looks like fish oil and 3 other tablets.  Urine is yellow due to medication    Today's PHQ-2 Score:   PHQ-2 ( 1999 Pfizer) 12/6/2022   Q1: Little interest or pleasure in doing things 0   Q2: Feeling down, depressed or hopeless 0   PHQ-2 Score 0   PHQ-2 Total Score (12-17 Years)- Positive if 3 or more points; Administer PHQ-A if positive -   Q1: Little interest or pleasure in doing things Not at all   Q2: Feeling down, depressed or hopeless Not at all   PHQ-2 Score 0     Social History     Tobacco Use     Smoking status: Never     Smokeless tobacco: Never     Tobacco comments:     Never smoked   Substance Use Topics      Alcohol use: Not Currently     Comment: rarely     If you drink alcohol do you typically have >3 drinks per day or >7 drinks per week? No    Alcohol Use 12/12/2022   Prescreen: >3 drinks/day or >7 drinks/week? -   Prescreen: >3 drinks/day or >7 drinks/week? No     Reviewed orders with patient.  Reviewed health maintenance and updated orders accordingly - Yes  Lab work is in process  Labs reviewed in EPIC  BP Readings from Last 3 Encounters:   12/12/22 (!) 89/63   12/31/21 122/84   07/15/20 110/79    Wt Readings from Last 3 Encounters:   12/12/22 93.4 kg (206 lb)   12/31/21 93.4 kg (206 lb)   07/15/20 83 kg (183 lb)          Patient Active Problem List   Diagnosis     Anxiety     Mild intermittent asthma without complication     Family history of melanoma     Multiple pigmented nevi     Eczema, unspecified type     TMJ (temporomandibular joint syndrome)     BMI 28.0-28.9,adult     Snoring     Episodic tension-type headache, not intractable     Irritable bowel syndrome, unspecified type     Shoulder pain, right     Sleep disorder     Past Surgical History:   Procedure Laterality Date     MANDIBLE SURGERY  08/2004     ORTHOPEDIC SURGERY  2004    Jaw surgery     PE TUBES      as child.       Social History     Tobacco Use     Smoking status: Never     Smokeless tobacco: Never     Tobacco comments:     Never smoked   Substance Use Topics     Alcohol use: Not Currently     Comment: rarely     Family History   Problem Relation Age of Onset     Osteoporosis Mother      Varicose Veins Mother      Hypertension Father      Allergies Father      Eye Disorder Father         Glasses     Anxiety Disorder Sister      Anxiety Disorder Sister      Thyroid Disease Maternal Grandmother      Heart Disease Maternal Grandfather         Pace Maker     Diabetes Maternal Grandfather         Type 2 late in life     Prostate Cancer Maternal Grandfather         Prostate     Eye Disorder Maternal Grandfather         Glasses     Hypertension  Paternal Grandfather         Recently passed in December 2021     Colon Cancer Other         Mothers grandmother and great grandmother     Mental Illness Other         Bipolar     Osteoporosis Other         Aunt     Thyroid Disease Other         Maternal aunt     Obesity Other         Maternal cousin         Current Outpatient Medications   Medication Sig Dispense Refill     albuterol (PROAIR HFA/PROVENTIL HFA/VENTOLIN HFA) 108 (90 Base) MCG/ACT inhaler Inhale 2 puffs into the lungs every 6 hours as needed for shortness of breath / dyspnea or wheezing 18 g 1     doxylamine (UNISOM) 25 MG TABS tablet Take 1 tablet (25 mg) by mouth nightly as needed for sleep 30 tablet 1     melatonin 3 MG tablet Take 1 tablet (3 mg) by mouth nightly as needed for sleep 30 tablet 1     Prenatal MV-Min-Fe Fum-FA-DHA (PRENATAL 1 PO)        UNABLE TO FIND 50 mg MEDICATION NAME: Calm 50 mg       Allergies   Allergen Reactions     Lactose GI Disturbance and Itching     Other Environmental Allergy      Sulfa Drugs Rash     As a child     Recent Labs   Lab Test 12/12/22  0904 12/31/21  1424 07/15/20  0847   A1C 5.4  --   --    * 150* 137*   HDL 47* 55 52   TRIG 77 68 64   ALT 8* 16  --    CR 0.97* 0.86  --    GFRESTIMATED 77 90  --    POTASSIUM 4.4 3.9  --    TSH 1.22 1.77 2.05        Breast Cancer Screening:    FHS-7:   Breast CA Risk Assessment (FHS-7) 12/31/2021 12/6/2022   Did any of your first-degree relatives have breast or ovarian cancer? No Unknown   Did any of your relatives have bilateral breast cancer? - Unknown   Did any man in your family have breast cancer? Unknown Unknown   Did any woman in your family have breast and ovarian cancer? Unknown Unknown   Did any woman in your family have breast cancer before age 50 y? Unknown Unknown   Do you have 2 or more relatives with breast and/or ovarian cancer? Unknown Unknown   Do you have 2 or more relatives with breast and/or bowel cancer? Unknown Yes     Pertinent mammograms are  reviewed under the imaging tab.    History of abnormal Pap smear:   NO - age 30-65 PAP every 5 years with negative HPV co-testing recommended  Last 3 Pap and HPV Results:   PAP / HPV 2012   PAP (Historical) NIL     PAP / HPV 2012   PAP (Historical) NIL     Reviewed and updated as needed this visit by clinical staff   Tobacco  Allergies  Meds  Problems  Med Hx  Surg Hx  Fam Hx  Soc   Hx        Reviewed and updated as needed this visit by Provider   Tobacco  Allergies  Meds  Problems  Med Hx  Surg Hx  Fam Hx  Soc   Hx       Past Medical History:   Diagnosis Date     CARDIOVASCULAR SCREENING; LDL GOAL LESS THAN 160 2012     Contraceptive management 2012     Jaw pain 2021     Nonintractable episodic headache, unspecified headache type 2022     Plantar warts 2012     Uncomplicated asthma       Past Surgical History:   Procedure Laterality Date     MANDIBLE SURGERY  2004     ORTHOPEDIC SURGERY      Jaw surgery     PE TUBES      as child.     OB History    Para Term  AB Living   0 0 0 0 0 0   SAB IAB Ectopic Multiple Live Births   0 0 0 0 0       Review of Systems   Constitutional: Negative for chills and fever.   HENT: Negative for congestion, ear pain, hearing loss and sore throat.    Eyes: Negative for pain and visual disturbance.   Respiratory: Negative for cough and shortness of breath.    Cardiovascular: Negative for chest pain, palpitations and peripheral edema.   Gastrointestinal: Negative for abdominal pain, constipation, diarrhea, heartburn, hematochezia and nausea.   Breasts:  Negative for tenderness, breast mass and discharge.   Genitourinary: Negative for dysuria, frequency, genital sores, hematuria, pelvic pain, urgency, vaginal bleeding and vaginal discharge.   Musculoskeletal: Positive for myalgias. Negative for arthralgias and joint swelling.   Skin: Negative for rash.   Neurological: Negative for dizziness, weakness, headaches and  "paresthesias.   Psychiatric/Behavioral: Negative for mood changes. The patient is nervous/anxious.       OBJECTIVE:   BP (!) 89/63   Pulse 97   Temp 97.8  F (36.6  C) (Oral)   Resp 12   Ht 1.791 m (5' 10.5\")   Wt 93.4 kg (206 lb)   LMP 11/30/2022 (Exact Date)   SpO2 96%   Breastfeeding No   BMI 29.14 kg/m    Physical Exam  GENERAL: healthy, alert, no distress and over weight  EYES: Eyes grossly normal to inspection, PERRL and conjunctivae and sclerae normal  HENT: ear canals and TM's normal, nose and mouth without ulcers or lesions  NECK: no adenopathy, no asymmetry, masses, or scars and thyroid normal to palpation  RESP: lungs clear to auscultation - no rales, rhonchi or wheezes  BREAST: normal without masses, tenderness or nipple discharge and no palpable axillary masses or adenopathy  CV: regular rate and rhythm, normal S1 S2, no S3 or S4, no murmur, click or rub, no peripheral edema and peripheral pulses strong  ABDOMEN: soft, non tender, no hepatosplenomegaly, no masses and bowel sounds normal   (female): MBR and perirectal area skin appears a bit erythematous.  There is no peeling or drainage.  Normal urethral meatus , vaginal mucosa pink, moist, well rugated, normal cervix, adnexae, and uterus without masses. and Pap and HPV obtained  MS: no gross musculoskeletal defects noted, no edema  SKIN: no suspicious lesions or rashes, some freckles on back, multiple pigmented nevi that look benign, some self colored papule suggestive of seborrheic keratosis dermatofibroma on neck, has a fleshy hyperpigmented mole suggestive of a benign compound nevus versus seborrheic keratosis 1.5 cm right lower abdomen waistline in the midclavicular line.  NEURO: Normal strength and tone, mentation intact and speech normal  PSYCH: mentation appears normal, affect normal/bright  LYMPH: no cervical, supraclavicular, axillary, or inguinal adenopathy    Diagnostic Test Results:  Labs reviewed in Epic  Results for orders " placed or performed in visit on 12/12/22 (from the past 24 hour(s))   Wet prep - Clinic Collect    Specimen: Vagina; Swab   Result Value Ref Range    Trichomonas Absent Absent    Yeast Absent Absent    Clue Cells Absent Absent    WBCs/high power field 1+ (A) None   CBC with platelets and differential    Narrative    The following orders were created for panel order CBC with platelets and differential.  Procedure                               Abnormality         Status                     ---------                               -----------         ------                     CBC with platelets and d...[[id number]]                      Final result                 Please view results for these tests on the individual orders.   Comprehensive metabolic panel (BMP + Alb, Alk Phos, ALT, AST, Total. Bili, TP)   Result Value Ref Range    Sodium 140 136 - 145 mmol/L    Potassium 4.4 3.4 - 5.3 mmol/L    Chloride 105 98 - 107 mmol/L    Carbon Dioxide (CO2) 27 22 - 29 mmol/L    Anion Gap 8 7 - 15 mmol/L    Urea Nitrogen 11.0 6.0 - 20.0 mg/dL    Creatinine 0.97 (H) 0.51 - 0.95 mg/dL    Calcium 9.1 8.6 - 10.0 mg/dL    Glucose 95 70 - 99 mg/dL    Alkaline Phosphatase 36 35 - 104 U/L    AST 20 10 - 35 U/L    ALT 8 (L) 10 - 35 U/L    Protein Total 6.9 6.4 - 8.3 g/dL    Albumin 4.2 3.5 - 5.2 g/dL    Bilirubin Total 0.3 <=1.2 mg/dL    GFR Estimate 77 >60 mL/min/1.73m2   Lipid Profile (Chol, Trig, HDL, LDL calc)   Result Value Ref Range    Cholesterol 226 (H) <200 mg/dL    Triglycerides 77 <150 mg/dL    Direct Measure HDL 47 (L) >=50 mg/dL    LDL Cholesterol Calculated 164 (H) <=100 mg/dL    Non HDL Cholesterol 179 (H) <130 mg/dL    Narrative    Cholesterol  Desirable:  <200 mg/dL    Triglycerides  Normal:  Less than 150 mg/dL  Borderline High:  150-199 mg/dL  High:  200-499 mg/dL  Very High:  Greater than or equal to 500 mg/dL    Direct Measure HDL  Female:  Greater than or equal to 50 mg/dL   Male:  Greater than or equal to 40  mg/dL    LDL Cholesterol  Desirable:  <100mg/dL  Above Desirable:  100-129 mg/dL   Borderline High:  130-159 mg/dL   High:  160-189 mg/dL   Very High:  >= 190 mg/dL    Non HDL Cholesterol  Desirable:  130 mg/dL  Above Desirable:  130-159 mg/dL  Borderline High:  160-189 mg/dL  High:  190-219 mg/dL  Very High:  Greater than or equal to 220 mg/dL   TSH with free T4 reflex   Result Value Ref Range    TSH 1.22 0.30 - 4.20 uIU/mL   Hemoglobin A1c   Result Value Ref Range    Hemoglobin A1C 5.4 0.0 - 5.6 %   CBC with platelets and differential   Result Value Ref Range    WBC Count 6.0 4.0 - 11.0 10e3/uL    RBC Count 4.49 3.80 - 5.20 10e6/uL    Hemoglobin 12.7 11.7 - 15.7 g/dL    Hematocrit 39.4 35.0 - 47.0 %    MCV 88 78 - 100 fL    MCH 28.3 26.5 - 33.0 pg    MCHC 32.2 31.5 - 36.5 g/dL    RDW 13.9 10.0 - 15.0 %    Platelet Count 209 150 - 450 10e3/uL    % Neutrophils 68 %    % Lymphocytes 21 %    % Monocytes 7 %    % Eosinophils 3 %    % Basophils 1 %    % Immature Granulocytes 0 %    Absolute Neutrophils 4.1 1.6 - 8.3 10e3/uL    Absolute Lymphocytes 1.2 0.8 - 5.3 10e3/uL    Absolute Monocytes 0.4 0.0 - 1.3 10e3/uL    Absolute Eosinophils 0.2 0.0 - 0.7 10e3/uL    Absolute Basophils 0.1 0.0 - 0.2 10e3/uL    Absolute Immature Granulocytes 0.0 <=0.4 10e3/uL     Results for orders placed or performed in visit on 12/12/22   Comprehensive metabolic panel (BMP + Alb, Alk Phos, ALT, AST, Total. Bili, TP)     Status: Abnormal   Result Value Ref Range    Sodium 140 136 - 145 mmol/L    Potassium 4.4 3.4 - 5.3 mmol/L    Chloride 105 98 - 107 mmol/L    Carbon Dioxide (CO2) 27 22 - 29 mmol/L    Anion Gap 8 7 - 15 mmol/L    Urea Nitrogen 11.0 6.0 - 20.0 mg/dL    Creatinine 0.97 (H) 0.51 - 0.95 mg/dL    Calcium 9.1 8.6 - 10.0 mg/dL    Glucose 95 70 - 99 mg/dL    Alkaline Phosphatase 36 35 - 104 U/L    AST 20 10 - 35 U/L    ALT 8 (L) 10 - 35 U/L    Protein Total 6.9 6.4 - 8.3 g/dL    Albumin 4.2 3.5 - 5.2 g/dL    Bilirubin Total 0.3 <=1.2  mg/dL    GFR Estimate 77 >60 mL/min/1.73m2   Lipid Profile (Chol, Trig, HDL, LDL calc)     Status: Abnormal   Result Value Ref Range    Cholesterol 226 (H) <200 mg/dL    Triglycerides 77 <150 mg/dL    Direct Measure HDL 47 (L) >=50 mg/dL    LDL Cholesterol Calculated 164 (H) <=100 mg/dL    Non HDL Cholesterol 179 (H) <130 mg/dL    Narrative    Cholesterol  Desirable:  <200 mg/dL    Triglycerides  Normal:  Less than 150 mg/dL  Borderline High:  150-199 mg/dL  High:  200-499 mg/dL  Very High:  Greater than or equal to 500 mg/dL    Direct Measure HDL  Female:  Greater than or equal to 50 mg/dL   Male:  Greater than or equal to 40 mg/dL    LDL Cholesterol  Desirable:  <100mg/dL  Above Desirable:  100-129 mg/dL   Borderline High:  130-159 mg/dL   High:  160-189 mg/dL   Very High:  >= 190 mg/dL    Non HDL Cholesterol  Desirable:  130 mg/dL  Above Desirable:  130-159 mg/dL  Borderline High:  160-189 mg/dL  High:  190-219 mg/dL  Very High:  Greater than or equal to 220 mg/dL   TSH with free T4 reflex     Status: Normal   Result Value Ref Range    TSH 1.22 0.30 - 4.20 uIU/mL   Hemoglobin A1c     Status: Normal   Result Value Ref Range    Hemoglobin A1C 5.4 0.0 - 5.6 %   CBC with platelets and differential     Status: None   Result Value Ref Range    WBC Count 6.0 4.0 - 11.0 10e3/uL    RBC Count 4.49 3.80 - 5.20 10e6/uL    Hemoglobin 12.7 11.7 - 15.7 g/dL    Hematocrit 39.4 35.0 - 47.0 %    MCV 88 78 - 100 fL    MCH 28.3 26.5 - 33.0 pg    MCHC 32.2 31.5 - 36.5 g/dL    RDW 13.9 10.0 - 15.0 %    Platelet Count 209 150 - 450 10e3/uL    % Neutrophils 68 %    % Lymphocytes 21 %    % Monocytes 7 %    % Eosinophils 3 %    % Basophils 1 %    % Immature Granulocytes 0 %    Absolute Neutrophils 4.1 1.6 - 8.3 10e3/uL    Absolute Lymphocytes 1.2 0.8 - 5.3 10e3/uL    Absolute Monocytes 0.4 0.0 - 1.3 10e3/uL    Absolute Eosinophils 0.2 0.0 - 0.7 10e3/uL    Absolute Basophils 0.1 0.0 - 0.2 10e3/uL    Absolute Immature Granulocytes 0.0 <=0.4  10e3/uL   Wet prep - Clinic Collect     Status: Abnormal    Specimen: Vagina; Swab   Result Value Ref Range    Trichomonas Absent Absent    Yeast Absent Absent    Clue Cells Absent Absent    WBCs/high power field 1+ (A) None   CBC with platelets and differential     Status: None    Narrative    The following orders were created for panel order CBC with platelets and differential.  Procedure                               Abnormality         Status                     ---------                               -----------         ------                     CBC with platelets and d...[[id number]]                      Final result                 Please view results for these tests on the individual orders.       ASSESSMENT/PLAN:       ICD-10-CM    1. Routine history and physical examination of adult  Z00.00 Pap screen with HPV - recommended age 30 - 65 years     Hepatitis B surface antigen     Hepatitis B Surface Antibody     PNEUMOCOCCAL 20 VALENT CONJUGATE (PREVNAR 20)     Hepatitis B surface antigen     Hepatitis B Surface Antibody      2. Mild intermittent asthma without complication  J45.20 Asthma Action Plan (AAP)     albuterol (PROAIR HFA/PROVENTIL HFA/VENTOLIN HFA) 108 (90 Base) MCG/ACT inhaler     PNEUMOCOCCAL 20 VALENT CONJUGATE (PREVNAR 20)      3. Anxiety  F41.9 TSH with free T4 reflex     TSH with free T4 reflex      4. Sleep disorder  G47.9 TSH with free T4 reflex     TSH with free T4 reflex      5. Snoring  R06.83 CBC with platelets and differential     TSH with free T4 reflex     CBC with platelets and differential     TSH with free T4 reflex      6. BMI 29.0-29.9,adult  Z68.29 Comprehensive metabolic panel (BMP + Alb, Alk Phos, ALT, AST, Total. Bili, TP)     Lipid Profile (Chol, Trig, HDL, LDL calc)     TSH with free T4 reflex     Comprehensive metabolic panel (BMP + Alb, Alk Phos, ALT, AST, Total. Bili, TP)     Lipid Profile (Chol, Trig, HDL, LDL calc)     TSH with free T4 reflex      7. Episodic  tension-type headache, not intractable  G44.219 CBC with platelets and differential     TSH with free T4 reflex     CBC with platelets and differential     TSH with free T4 reflex      8. Irritable bowel syndrome, unspecified type  K58.9 CBC with platelets and differential     TSH with free T4 reflex     CBC with platelets and differential     TSH with free T4 reflex      9. Right shoulder pain, unspecified chronicity  M25.511       10. TMJ (temporomandibular joint syndrome)  M26.609 TSH with free T4 reflex     TSH with free T4 reflex      11. Eczema, unspecified type  L30.9 CANCELED: Adult Dermatology Referral      12. Multiple pigmented nevi  D22.9 CANCELED: Adult Dermatology Referral      13. Family history of melanoma  Z80.8 CANCELED: Adult Dermatology Referral      14. Vaginal itching  N89.8 Wet prep - Clinic Collect      15. Environmental allergies  Z91.09 [place] Resp Allergen Panel     [place] Resp Allergen Panel      16. Immunity status testing  Z01.84 Hepatitis B surface antigen     Hepatitis B Surface Antibody     Hepatitis B surface antigen     Hepatitis B Surface Antibody      17. Health care maintenance  Z00.00 REVIEW OF HEALTH MAINTENANCE PROTOCOL ORDERS      18. Advanced directives, counseling/discussion  Z71.89       19. Need for prophylactic vaccination and inoculation against influenza  Z23       20. Need for COVID-19 vaccine  Z23       21. Need for pneumococcal vaccine  Z23 PNEUMOCOCCAL 20 VALENT CONJUGATE (PREVNAR 20)      22. Need for hepatitis B vaccination  Z23       23. Screening for diabetes mellitus  Z13.1 Hemoglobin A1c     Hemoglobin A1c        Seen for preventive health and additional concerns today   Self breast check regularly   Consider referral to a  to assess personal risk if should have any family hx of breast, ovarian or bowel cancer  Mammogram starting age 40 and then per recommendations  Pelvic / PAP HPV obtained today     Vaginal itching.  Exam is actually  unremarkable wet prep done just to be safe.  Itching I suspect is related to irritation of the skin in the perirectal labial area.  Recommend avoid harsh soaps.  Decrease detergent amount when washing underclothes.  Avoid underwear at night.  May try Desitin over-the-counter to help the skin heal and decrease itching.    For history of environmental allergies [place] allergy panel to be done today.  If need more work-up can refer to allergist in the future.  Has a history of lactose intolerance historically.  Discussed testing would not  testing could be expensive and the simplest option might be just to avoid dairy if is causing symptoms and if would like to take dairy may take Lactaid over-the-counter prior to consuming dairy products.  Will defer referral to allergist for now.    Mild intermittent asthma well-controlled.  Pneumonia vaccine given, asthma action plan in place and albuterol refilled to use as needed.    Anxiety generalized and related to health.  Continue with counseling and self cares & the calm supplement.    Sleep disorder /history of snoring.  Follow-up with sleep as planned in March.  Unfortunately no earlier appointment available. Continue care with her naturopath and counselor. Has Unisom and melatonin to use as needed, has not needed to try so far. Has been using the calm supp,     BMI 29. & elevated LDL. Continue to stay active eat healthy and will monitor and do lab work today.    Tension headaches likely related to TMJ, stress, dehydration etc. Continue to stay well-hydrated and use the mouthguard which is helping.    TMJ using a mouthguard and consider physical  therapy for this in the future.    Right shoulder pain improved with physical therapy.  Continue with exercises.    Irritable bowel syndrome manageable with dietary modification. There is no good test for lactose intolerance.  There is one test that can be expensive but a simple solution might be just to avoid  lactose or take Lactaid if eating dairy.  There is no test to confirm irritable bowel syndrome.  Its a diagnosis of exclusion based on clinical symptoms and history.  We could check for celiac in the future refer to GI if remains concerned.    Eczema manageable with self cares and use of emollients and gloves on hands as needed.    Continue care with dermatology consultants regarding skin checks for multiple pigmented nevi and family history of melanoma.  The moles seen on back and neck and lower abdomen look benign & to continue to monitor.    Health care maintenance reviewed  Consider working on health care directives  Recommend Flu shot yearly and up-to-date  Recommend Tdap every 10 yrs and up-to-date  COVID-vaccine primary series and booster up-to-date  Prevnar 20 given today given history of asthma.  Hepatitis B vaccination recommended, feels had it when in the [employer].  We will do immune testing with titers to see if immune and if not may get a booster shot  Recommend Shingles vaccine starting at 50   If travelling out of the country recommend seeing the travel clinic to update appropriate shots etc  Labs today and will make further recommendations once reviewed    Recommend just 1 prenatal vitamin over-the-counter which has folic acid and DHA in it rather than one with 4 different meds to swallow as that may be difficult to comply with when pregnant.  Yellow coloration of urine may be normal when on  a vitamin    Return in 1 year for preventive physical and sooner in an office visit for any new concerns    Patient has been advised of split billing requirements and indicates understanding: Yes      COUNSELING:  Reviewed preventive health counseling, as reflected in patient instructions       Regular exercise       Healthy diet/nutrition       Vision screening       Immunizations    Vaccinated for: Pneumococcal         Alcohol Use          Family planning       The ASCVD Risk score (Marion SANCHEZ, et al., 2019)  failed to calculate for the following reasons:    The 2019 ASCVD risk score is only valid for ages 40 to 79       Advance Care Planning  She reports that she has never smoked. She has never used smokeless tobacco.      Mary Ann Taylor MD  [facility]

## 2022-12-12 NOTE — RESULT ENCOUNTER NOTE
Keron Ms. Erwin,  Your results came back showing   -LDL(bad) cholesterol level is elevated, HDL(good) cholesterol level is low.  A diet high in fat and simple carbohydrates, genetics and being overweight can contribute to this. ADVISE: exercising 150 minutes of aerobic exercise per week (30 minutes for 5 days per week or 50 minutes for 3 days per week are options) and eating a low saturated fat/low carbohydrate diet are helpful to improve this. In 12 months, you should recheck your cholesterol panel   -Liver and gallbladder tests are normal (ALT,AST, Alk phos, bilirubin), kidney function is normal (Cr, GFR), sodium is normal, potassium is normal, calcium is normal, glucose is normal.  -TSH (thyroid stimulating hormone) level is normal which indicates normal thyroid function.  . If you have any further concerns please do not hesitate to contact us by message, phone or making an appointment.  Have a good day   Dr Brandon WEINSTEIN

## 2022-12-12 NOTE — RESULT ENCOUNTER NOTE
Keron Ms. Erwin,  Some of your results came back and are within acceptable limits. -Normal red blood cell (hgb) levels, normal white blood cell count and normal platelet levels.  -A1C (diabetic test) is normal and indicates that your blood sugar has been in a normal range the last 3 months.  -No signs of bacteria or yeast vaginal infections.. If you have any further concerns please do not hesitate to contact us by message, phone or making an appointment.  Have a good day   Dr Brandon WEINSTEIN

## 2022-12-12 NOTE — PATIENT INSTRUCTIONS
Seen for preventive health and additional concerns today   Self breast check regularly   Consider referral to a  to assess personal risk if should have any family hx of breast, ovarian or bowel cancer  Mammogram starting age 40 and then per recommendations  Pelvic / PAP HPV obtained today     Mild intermittent asthma well-controlled.  Pneumonia vaccine given asthma action plan in place and albuterol refilled to use as needed.    Anxiety related to health.  Continue with counseling and self cares in the calm supplement.    Sleep disorder history of snoring.  Follow-up with sleep as planned in March.  Unfortunately no earlier appointment available. Continue care with your naturopath and counselor. Has Unisom and melatonin to use as needed has not needed to try so far.    BMI 29.  Continue to stay active eat healthy and will monitor and do lab work today.    Tension headaches likely related to TMJ, stress, dehydration etc. continue to stay well-hydrated and use the mouthguard which is helping.    Irritable bowel syndrome manageable with dietary modification.  There is no good test for lactose intolerance.  There is one test that can be expensive but a simple solution might be just to avoid lactose or take Lactaid if eating dairy.    Right shoulder pain improved with physical therapy.  TMJ using a mouthguard and consider therapy for this in the future.    Eczema manageable with self cares and use of emollients and gloves on hands as needed.  Continue care with dermatology consultants regarding skin checks for multiple pigmented nevi and family history of melanoma.  The mole seen on your back and neck and lower abdomen looks benign continue to monitor.    Vaginal itching.  Exam is actually unremarkable wet prep done just to be safe.  Itching I suspect is related to irritation of the skin in the perirectal labial area.  Recommend avoid harsh soaps.  Decrease detergent amount  Washing underclothes.  Avoid  underwear at night.  May try Desitin over-the-counter to help the skin heal and decrease itching.    For history of environmental allergies [place] allergy panel to be done today.  If need more work-up can refer to allergist in the future.    Health care maintenance reviewed  Consider working on health care directives  Recommend Flu shot yearly and up-to-date  Recommend Tdap every 10 yrs and up-to-date  COVID-vaccine primary series and booster up-to-date  Prevnar 20 given today given history of asthma.  Hepatitis B vaccination recommended, feels had it when in the [employer].  We will do immune testing with titers to see if immune and if not may get a booster shot  Recommend Shingles vaccine starting at 50   If travelling out of the country recommend seeing the travel clinic to update appropriate shots etc  Labs today and will make further recommendations once reviewed    Recommend just 1 prenatal vitamin over-the-counter which has folic acid and DHA in it rather than one with 4 different meds to swallow as that may be difficult to comply with when pregnant.  Yellow coloration of urine may be normal when on  a vitamin    Return in 1 year for preventive physical and sooner in an office visit for any new concerns    Preventive Health Recommendations  Female Ages 26 - 39  Yearly exam:   See your health care provider every year in order to  Review health changes.   Discuss preventive care.    Review your medicines if you your doctor has prescribed any.    Until age 30: Get a Pap test every three years (more often if you have had an abnormal result).    After age 30: Talk to your doctor about whether you should have a Pap test every 3 years or have a Pap test with HPV screening every 5 years.   You do not need a Pap test if your uterus was removed (hysterectomy) and you have not had cancer.  You should be tested each year for STDs (sexually transmitted diseases), if you're at risk.   Talk to your provider about how  often to have your cholesterol checked.  If you are at risk for diabetes, you should have a diabetes test (fasting glucose).  Shots: Get a flu shot each year. Get a tetanus shot every 10 years.   Nutrition:   Eat at least 5 servings of fruits and vegetables each day.  Eat whole-grain bread, whole-wheat pasta and brown rice instead of white grains and rice.  Get adequate Calcium and Vitamin D.     Lifestyle  Exercise at least 150 minutes a week (30 minutes a day, 5 days of the week). This will help you control your weight and prevent disease.  Limit alcohol to one drink per day.  No smoking.   Wear sunscreen to prevent skin cancer.  See your dentist every six months for an exam and cleaning.

## 2022-12-13 ENCOUNTER — THERAPY VISIT (OUTPATIENT)
Dept: PHYSICAL THERAPY | Facility: CLINIC | Age: 37
End: 2022-12-13
Payer: COMMERCIAL

## 2022-12-13 ENCOUNTER — MYC MEDICAL ADVICE (OUTPATIENT)
Dept: FAMILY MEDICINE | Facility: CLINIC | Age: 37
End: 2022-12-13

## 2022-12-13 DIAGNOSIS — M26.609 TMJ (TEMPOROMANDIBULAR JOINT SYNDROME): Primary | ICD-10-CM

## 2022-12-13 DIAGNOSIS — M25.511 RIGHT SHOULDER PAIN, UNSPECIFIED CHRONICITY: Primary | ICD-10-CM

## 2022-12-13 PROCEDURE — 97112 NEUROMUSCULAR REEDUCATION: CPT | Mod: GP | Performed by: PHYSICAL THERAPIST

## 2022-12-13 PROCEDURE — 97110 THERAPEUTIC EXERCISES: CPT | Mod: GP | Performed by: PHYSICAL THERAPIST

## 2022-12-13 NOTE — PROGRESS NOTES
Discharge Note    Progress reporting period is from initial evaluation date (please see noted date below) to Dec 13, 2022.  Linked Episodes   Type: Episode: Status: Noted: Resolved: Last update: Updated by:   PHYSICAL THERAPY B shoulder pain 9/9/22 Active 9/9/2022 12/13/2022  7:19 AM Crow Luque, PT      Comments:       Coty was seen for 5 visits addressing patient's chief complaint of R>L shoulder pain with initial onset in 12/2021 after a fall on an outstretched hand with a subsequent fall down the stairs in 04/2022 (R shoulder in 90/90 position).     Pt has made considerable progress in shoulder ROM and strength and will cont HEP independently    SUBJECTIVE  Subjective changes noted by patient:  Pt reports doing well, improved shoulder ROM. Still has some neck stiffness at work but overall much better  .  Current pain level is 2/10.     Previous pain level was  4/10.   Changes in function:  Yes (See Goal flowsheet attached for changes in current functional level)  Adverse reaction to treatment or activity: None    OBJECTIVE  Changes noted in objective findings: Painfree WNL shoulder AROM/PROM. Painfree resisted shoulder motions. Review cervical ROM exercises and discussed computer ergonomics     ASSESSMENT/PLAN  Diagnosis: B shoulder pain (R>L shoulder); Potential cervical involvement   Updated problem list and treatment plan:   Decreased ROM/flexibility - HEP  STG/LTGs have been met or progress has been made towards goals:  Yes, please see goal flowsheet for most current information  Assessment of Progress: current status is unknown.    Last current status: Pt is progressing well   Self Management Plans:  HEP  I have re-evaluated this patient and find that the nature, scope, duration and intensity of the therapy is appropriate for the medical condition of the patient.  Coty continues to require the following intervention to meet STG and LTG's:  HEP.    Recommendations:  Discharge with current home program.   Patient to follow up with MD as needed.    Please refer to the daily flowsheet for treatment today, total treatment time and time spent performing 1:1 timed codes.

## 2022-12-13 NOTE — RESULT ENCOUNTER NOTE
Keron Ms. Erwin,  Your results came back for hepatitis B infection with immune to it likely from prior vaccination when you were in the [employer].  I will put this information in your epic chart so we do not keep asking you to get immunized.  If you can find the old immunization record that would be wonderful but if not that is okay to.    If you have any further concerns please do not hesitate to contact us by message, phone or making an appointment.  Have a good day   Dr Brandon WEINSTEIN

## 2022-12-14 LAB
BKR LAB AP GYN ADEQUACY: NORMAL
BKR LAB AP GYN INTERPRETATION: NORMAL
BKR LAB AP HPV REFLEX: NORMAL
BKR LAB AP PREVIOUS ABNORMAL: NORMAL
PATH REPORT.COMMENTS IMP SPEC: NORMAL
PATH REPORT.COMMENTS IMP SPEC: NORMAL
PATH REPORT.RELEVANT HX SPEC: NORMAL

## 2022-12-14 NOTE — TELEPHONE ENCOUNTER
Writer responded via [employer].    MADDIE RoseN, RN-BC  [facility]

## 2022-12-14 NOTE — TELEPHONE ENCOUNTER
Sleep issues can affect the body that true   But one night of not sleeping till 2 am wont raise ldl.  Its probably a combo of genetics, diet, sleep , lifestyle etc  Her overall cardiovascular risk very low so try not to worry about it too much but maybe make some changes in diet and stay active and plan to recheck1 1 yr fasting to give time to see what it is over time

## 2022-12-15 ENCOUNTER — MYC MEDICAL ADVICE (OUTPATIENT)
Dept: FAMILY MEDICINE | Facility: CLINIC | Age: 37
End: 2022-12-15

## 2022-12-15 LAB
A ALTERNATA IGE QN: <0.1 KU(A)/L
A FUMIGATUS IGE QN: <0.1 KU(A)/L
BERMUDA GRASS IGE QN: <0.1 KU(A)/L
C HERBARUM IGE QN: <0.1 KU(A)/L
CAT DANDER IGG QN: 0.27 KU(A)/L
CEDAR IGE QN: <0.1 KU(A)/L
COMMON RAGWEED IGE QN: <0.1 KU(A)/L
COTTONWOOD IGE QN: <0.1 KU(A)/L
D FARINAE IGE QN: <0.1 KU(A)/L
D PTERONYSS IGE QN: 0.1 KU(A)/L
DOG DANDER+EPITH IGE QN: <0.1 KU(A)/L
IGE SERPL-ACNC: 13 KU/L (ref 0–114)
MAPLE IGE QN: <0.1 KU(A)/L
MARSH ELDER IGE QN: <0.1 KU(A)/L
MOUSE URINE PROT IGE QN: 4.17 KU(A)/L
NETTLE IGE QN: <0.1 KU(A)/L
P NOTATUM IGE QN: <0.1 KU(A)/L
ROACH IGE QN: <0.1 KU(A)/L
SALTWORT IGE QN: <0.1 KU(A)/L
SILVER BIRCH IGE QN: <0.1 KU(A)/L
TIMOTHY IGE QN: <0.1 KU(A)/L
WHITE ASH IGE QN: <0.1 KU(A)/L
WHITE ELM IGE QN: <0.1 KU(A)/L
WHITE MULBERRY IGE QN: <0.1 KU(A)/L
WHITE OAK IGE QN: <0.1 KU(A)/L

## 2022-12-15 NOTE — TELEPHONE ENCOUNTER
I looked at the bottle & list of ingredients. Has what standard pnv contain across the board. Maybe on the higher levels for some like vit a & iodine but doesnt seem to exceed max limit recommendations. If eating a well balanced diet would not want to inadvertantly get more than required nutrients from diet & supp. Also has some things not in standard pnv benefit of which I dont really know of. If nutritionist said was ok to take should be fine my only concern was it seems has to take 4 a day to achieve requirements per bottle which may be difficult to do with pregnancy sometimes ( nausea etc) . Might be easier to just take a standard otc pnv available everywhere, it would be just one a day & I think easier with similar benefit but will leave that decision up to her to make with her nutritionist based on how feels on taking 4 versus 1 pill a day

## 2022-12-15 NOTE — TELEPHONE ENCOUNTER
Writer responded via [employer].    MISAEL Rosario RN  [facility]     [facility]

## 2022-12-15 NOTE — RESULT ENCOUNTER NOTE
Keron Ms. Erwin,  Based on blood tests for [facility] respiratory allergen panel looks like you are allergic to cat dander, dust mite and mouse urine .  These are low allergies except noted to be higher allergy with the mouse urine.  if you have any further concerns please do not hesitate to contact us by message, phone or making an appointment.  Have a good day   Dr Brandon WEINSTEIN

## 2022-12-16 LAB
HUMAN PAPILLOMA VIRUS 16 DNA: NEGATIVE
HUMAN PAPILLOMA VIRUS 18 DNA: NEGATIVE
HUMAN PAPILLOMA VIRUS FINAL DIAGNOSIS: NORMAL
HUMAN PAPILLOMA VIRUS OTHER HR: NEGATIVE

## 2022-12-27 ENCOUNTER — THERAPY VISIT (OUTPATIENT)
Dept: PHYSICAL THERAPY | Facility: CLINIC | Age: 37
End: 2022-12-27
Payer: COMMERCIAL

## 2022-12-27 DIAGNOSIS — M25.511 RIGHT SHOULDER PAIN, UNSPECIFIED CHRONICITY: Primary | ICD-10-CM

## 2022-12-27 PROCEDURE — 97110 THERAPEUTIC EXERCISES: CPT | Mod: GP | Performed by: PHYSICAL THERAPIST

## 2022-12-27 PROCEDURE — 97112 NEUROMUSCULAR REEDUCATION: CPT | Mod: GP | Performed by: PHYSICAL THERAPIST

## 2022-12-28 ENCOUNTER — OFFICE VISIT (OUTPATIENT)
Dept: SLEEP MEDICINE | Facility: CLINIC | Age: 37
End: 2022-12-28
Attending: FAMILY MEDICINE
Payer: COMMERCIAL

## 2022-12-28 ENCOUNTER — LAB (OUTPATIENT)
Dept: LAB | Facility: CLINIC | Age: 37
End: 2022-12-28
Payer: COMMERCIAL

## 2022-12-28 VITALS
HEIGHT: 71 IN | HEART RATE: 94 BPM | OXYGEN SATURATION: 98 % | BODY MASS INDEX: 28.84 KG/M2 | SYSTOLIC BLOOD PRESSURE: 108 MMHG | RESPIRATION RATE: 14 BRPM | DIASTOLIC BLOOD PRESSURE: 79 MMHG | WEIGHT: 206 LBS

## 2022-12-28 DIAGNOSIS — G47.00 INSOMNIA, UNSPECIFIED TYPE: ICD-10-CM

## 2022-12-28 DIAGNOSIS — G47.33 OSA (OBSTRUCTIVE SLEEP APNEA): Primary | ICD-10-CM

## 2022-12-28 DIAGNOSIS — D50.8 IRON DEFICIENCY ANEMIA DUE TO DIETARY CAUSES: ICD-10-CM

## 2022-12-28 DIAGNOSIS — R06.83 SNORING: ICD-10-CM

## 2022-12-28 LAB
FERRITIN SERPL-MCNC: 22 NG/ML (ref 12–150)
IRON SATN MFR SERPL: 20 % (ref 15–46)
IRON SERPL-MCNC: 50 UG/DL (ref 35–180)
TIBC SERPL-MCNC: 255 UG/DL (ref 240–430)

## 2022-12-28 PROCEDURE — 82728 ASSAY OF FERRITIN: CPT

## 2022-12-28 PROCEDURE — 99205 OFFICE O/P NEW HI 60 MIN: CPT | Performed by: INTERNAL MEDICINE

## 2022-12-28 PROCEDURE — 36415 COLL VENOUS BLD VENIPUNCTURE: CPT

## 2022-12-28 PROCEDURE — 83550 IRON BINDING TEST: CPT

## 2022-12-28 ASSESSMENT — SLEEP AND FATIGUE QUESTIONNAIRES
HOW LIKELY ARE YOU TO NOD OFF OR FALL ASLEEP WHILE SITTING AND READING: WOULD NEVER DOZE
HOW LIKELY ARE YOU TO NOD OFF OR FALL ASLEEP WHILE WATCHING TV: WOULD NEVER DOZE
HOW LIKELY ARE YOU TO NOD OFF OR FALL ASLEEP WHEN YOU ARE A PASSENGER IN A CAR FOR AN HOUR WITHOUT A BREAK: WOULD NEVER DOZE
HOW LIKELY ARE YOU TO NOD OFF OR FALL ASLEEP WHILE LYING DOWN TO REST IN THE AFTERNOON WHEN CIRCUMSTANCES PERMIT: WOULD NEVER DOZE
HOW LIKELY ARE YOU TO NOD OFF OR FALL ASLEEP WHILE SITTING INACTIVE IN A PUBLIC PLACE: WOULD NEVER DOZE
HOW LIKELY ARE YOU TO NOD OFF OR FALL ASLEEP WHILE SITTING QUIETLY AFTER LUNCH WITHOUT ALCOHOL: WOULD NEVER DOZE
HOW LIKELY ARE YOU TO NOD OFF OR FALL ASLEEP IN A CAR, WHILE STOPPED FOR A FEW MINUTES IN TRAFFIC: WOULD NEVER DOZE
HOW LIKELY ARE YOU TO NOD OFF OR FALL ASLEEP WHILE SITTING AND TALKING TO SOMEONE: WOULD NEVER DOZE

## 2022-12-28 NOTE — NURSING NOTE
HST and CBTI appt have been scheduled.  Pt will follow up via [employer] for HST results.    KENAN Grove

## 2022-12-28 NOTE — PATIENT INSTRUCTIONS
"        MY TREATMENT INFORMATION FOR SLEEP APNEA-  Coty Erwin    DOCTOR : RAMO LAZO MD    Am I having a sleep study at a sleep center?  --->Due to normal delays, you will be contacted within 2-4 weeks to schedule    Am I having a home sleep study?  --->Watch the video for the device you are using:    -/drop off device-   https://www.[employer]el[employer].com/watch?v=yGGFBdELGhk    -Disposable device sent out require phone/computer application-   https://www.[employer].com/watch?v=BCce_vbiwxE      Frequently asked questions:  1. What is Obstructive Sleep Apnea (JASON)? JASON is the most common type of sleep apnea. Apnea means, \"without breath.\"  Apnea is most often caused by narrowing or collapse of the upper airway as muscles relax during sleep.   Almost everyone has occasional apneas. Most people with sleep apnea have had brief interruptions at night frequently for many years.  The severity of sleep apnea is related to how frequent and severe the events are.   2. What are the consequences of JASON? Symptoms include: feeling sleepy during the day, snoring loudly, gasping or stopping of breathing, trouble sleeping, and occasionally morning headaches or heartburn at night.  Sleepiness can be serious and even increase the risk of falling asleep while driving. Other health consequences may include development of high blood pressure and other cardiovascular disease in persons who are susceptible. Untreated JASON  can contribute to heart disease, stroke and diabetes.   3. What are the treatment options? In most situations, sleep apnea is a lifelong disease that must be managed with daily therapy. Medications are not effective for sleep apnea and surgery is generally not considered until other therapies have been tried. Your treatment is your choice . Continuous Positive Airway (CPAP) works right away and is the therapy that is effective in nearly everyone. An oral device to hold your jaw forward is usually the next most " reliable option. Other options include postioning devices (to keep you off your back), weight loss, and surgery including a tongue pacing device. There is more detail about some of these options below.  4. Are my sleep studies covered by insurance? Although we will request verification of coverage, we advise you also check in advance of the study to ensure there is coverage.    Important tips for those choosing CPAP and similar devices   Know your equipment:  CPAP is continuous positive airway pressure that prevents obstructive sleep apnea by keeping the throat from collapsing while you are sleeping. In most cases, the device is  smart  and can slowly self-adjusts if your throat collapses and keeps a record every day of how well you are treated-this information is available to you and your care team.  BPAP is bilevel positive airway pressure that keeps your throat open and also assists each breath with a pressure boost to maintain adequate breathing.  Special kinds of BPAP are used in patients who have inadequate breathing from lung or heart disease. In most cases, the device is  smart  and can slowly self-adjusts to assist breathing. Like CPAP, the device keeps a record of how well you are treated.  Your mask is your connection to the device. You get to choose what feels most comfortable and the staff will help to make sure if fits. Here: are some examples of the different masks that are available:       Key points to remember on your journey with sleep apnea:  Sleep study.  PAP devices often need to be adjusted during a sleep study to show that they are effective and adjusted right.  Good tips to remember: Try wearing just the mask during a quiet time during the day so your body adapts to wearing it. A humidifier is recommended for comfort in most cases to prevent drying of your nose and throat. Allergy medication from your provider may help you if you are having nasal congestion.  Getting settled-in. It takes  more than one night for most of us to get used to wearing a mask. Try wearing just the mask during a quiet time during the day so your body adapts to wearing it. A humidifier is recommended for comfort in most cases. Our team will work with you carefully on the first day and will be in contact within 4 days and again at 2 and 4 weeks for advice and remote device adjustments. Your therapy is evaluated by the device each day.   Use it every night. The more you are able to sleep naturally for 7-8 hours, the more likely you will have good sleep and to prevent health risks or symptoms from sleep apnea. Even if you use it 4 hours it helps. Occasionally all of us are unable to use a medical therapy, in sleep apnea, it is not dangerous to miss one night.   Communicate. Call our skilled team on the number provided on the first day if your visit for problems that make it difficult to wear the device. Over 2 out of 3 patients can learn to wear the device long-term with help from our team. Remember to call our team or your sleep providers if you are unable to wear the device as we may have other solutions for those who cannot adapt to mask CPAP therapy. It is recommended that you sleep your sleep provider within the first 3 months and yearly after that if you are not having problems.   Use it for your health. We encourage use of CPAP masks during daytime quiet periods to allow your face and brain to adapt to the sensation of CPAP so that it will be a more natural sensation to awaken to at night or during naps. This can be very useful during the first few weeks or months of adapting to CPAP though it does not help medically to wear CPAP during wakefulness and  should not be used as a strategy just to meet guidelines.  Take care of your equipment. Make sure you clean your mask and tubing using directions every day and that your filter and mask are replaced as recommended or if they are not working.     BESIDES CPAP, WHAT OTHER  THERAPIES ARE THERE?    Positioning Device  Positioning devices are generally used when sleep apnea is mild and only occurs on your back.This example shows a pillow that straps around the waist. It may be appropriate for those whose sleep study shows milder sleep apnea that occurs primarily when lying flat on one's back. Preliminary studies have shown benefit but effectiveness at home may need to be verified by a home sleep test. These devices are generally not covered by medical insurance.  Examples of devices that maintain sleeping on the back to prevent snoring and mild sleep apnea.    Belt type body positioner  http://[employer]    Electronic reminder  http://nightshifttherapy.com/            Oral Appliance  What is oral appliance therapy?  An oral appliance device fits on your teeth at night like a retainer used after having braces. The device is made by a specialized dentist and requires several visits over 1-2 months before a manufactured device is made to fit your teeth and is adjusted to prevent your sleep apnea. Once an oral device is working properly, snoring should be improved. A home sleep test may be recommended at that time if to determine whether the sleep apnea is adequately treated.       Some things to remember:  -Oral devices are often, but not always, covered by your medical insurance. Be sure to check with your insurance provider.   -If you are referred for oral therapy, you will be given a list of specialized dentists to consider or you may choose to visit the Web site of the American Academy of Dental Sleep Medicine  -Oral devices are less likely to work if you have severe sleep apnea or are extremely overweight.     More detailed information  An oral appliance is a small acrylic device that fits over the upper and lower teeth  (similar to a retainer or a mouth guard). This device slightly moves jaw forward, which moves the base of the tongue forward, opens the airway, improves breathing  for effective treat snoring and obstructive sleep apnea in perhaps 7 out of 10 people .  The best working devices are custom-made by a dental device  after a mold is made of the teeth 1, 2, 3.  When is an oral appliance indicated?  Oral appliance therapy is recommended as a first-line treatment for patients with primary snoring, mild sleep apnea, and for patients with moderate sleep apnea who prefer appliance therapy to use of CPAP4, 5. Severity of sleep apnea is determined by sleep testing and is based on the number of respiratory events per hour of sleep.   How successful is oral appliance therapy?  The success rate of oral appliance therapy in patients with mild sleep apnea is 75-80% while in patients with moderate sleep apnea it is 50-70%. The chance of success in patients with severe sleep apnea is 40-50%. The research also shows that oral appliances have a beneficial effect on the cardiovascular health of JASON patients at the same magnitude as CPAP therapy7.  Oral appliances should be a second-line treatment in cases of severe sleep apnea, but if not completely successful then a combination therapy utilizing CPAP plus oral appliance therapy may be effective. Oral appliances tend to be effective in a broad range of patients although studies show that the patients who have the highest success are females, younger patients, those with milder disease, and less severe obesity. 3, 6.   Finding a dentist that practices dental sleep medicine  Specific training is available through the American Academy of Dental Sleep Medicine for dentists interested in working in the field of sleep. To find a dentist who is educated in the field of sleep and the use of oral appliances, near you, visit the Web site of the American Academy of Dental Sleep Medicine.    References  1. Lizbeth et al. Objectively measured vs self-reported compliance during oral appliance therapy for sleep-disordered breathing. Chest 2013;  144(5): 4589-7180.  2. Jesus et al. Objective measurement of compliance during oral appliance therapy for sleep-disordered breathing. Thorax 2013; 68(1): 91-96.  3. Marti et al. Mandibular advancement devices in 620 men and women with JASON and snoring: tolerability and predictors of treatment success. Chest 2004; 125: 8190-1500.  4. Kathy, et al. Oral appliances for snoring and JASON: a review. Sleep 2006; 29: 244-262.  5. Bruna et al. Oral appliance treatment for JASON: an update. J Clin Sleep Med 2014; 10(2): 215-227.  6. Evita et al. Predictors of OSAH treatment outcome. J Dent Res 2007; 86: 5626-1632.      Weight Loss:    Weight loss is a long-term strategy that may improve sleep apnea in some patients.    Weight management is a personal decision and the decision should be based on your interest and the potential benefits.  If you are interested in exploring weight loss strategies, the following discussion covers the impact on weight loss on sleep apnea and the approaches that may be successful.    Being overweight does not necessarily mean you will have health consequences.  Those who have BMI over 35 or over 27 with existing medical conditions carries greater risk.   Weight loss decreases severity of sleep apnea in most people with obesity. For those with mild obesity who have developed snoring with weight gain, even 15-30 pound weight loss can improve and occasionally eliminate sleep apnea.  Structured and life-long dietary and health habits are necessary to lose weight and keep healthier weight levels.     Though there may be significant health benefits from weight loss, long-term weight loss is very difficult to achieve- studies show success with dietary management in less than 10% of people. In addition, substantial weight loss may require years of dietary control and may be difficult if patients have severe obesity. In these cases, surgical management may be considered.  Finally, older  individuals who have tolerated obesity without health complications may be less likely to benefit from weight loss strategies.      [unfilled]    Surgery:    Surgery for obstructive sleep apnea is considered generally only when other therapies fail to work. Surgery may be discussed with you if you are having a difficult time tolerating CPAP and or when there is an abnormal structure that requires surgical correction.  Nose and throat surgeries often enlarge the airway to prevent collapse.  Most of these surgeries create pain for 1-2 weeks and up to half of the most common surgeries are not effective throughout life.  You should carefully discuss the benefits and drawbacks to surgery with your sleep provider and surgeon to determine if it is the best solution for you.   More information  Surgery for JASON is directed at areas that are responsible for narrowing or complete obstruction of the airway during sleep.  There are a wide range of procedures available to enlarge and/or stabilize the airway to prevent blockage of breathing in the three major areas where it can occur: the palate, tongue, and nasal regions.  Successful surgical treatment depends on the accurate identification of the factors responsible for obstructive sleep apnea in each person.  A personalized approach is required because there is no single treatment that works well for everyone.  Because of anatomic variation, consultation with an examination by a sleep surgeon is a critical first step in determining what surgical options are best for each patient.  In some cases, examination during sedation may be recommended in order to guide the selection of procedures.  Patients will be counseled about risks and benefits as well as the typical recovery course after surgery. Surgery is typically not a cure for a person s JASON.  However, surgery will often significantly improve one s JASON severity (termed  success rate ).  Even in the absence of a cure, surgery  will decrease the cardiovascular risk associated with OSA7; improve overall quality of life8 (sleepiness, functionality, sleep quality, etc).      Palate Procedures:  Patients with JASON often have narrowing of their airway in the region of their tonsils and uvula.  The goals of palate procedures are to widen the airway in this region as well as to help the tissues resist collapse.  Modern palate procedure techniques focus on tissue conservation and soft tissue rearrangement, rather than tissue removal.  Often the uvula is preserved in this procedure. Residual sleep apnea is common in patient after pharyngoplasty with an average reduction in sleep apnea events of 33%2.      Tongue Procedures:  ExamWhile patients are awake, the muscles that surround the throat are active and keep this region open for breathing. These muscles relax during sleep, allowing the tongue and other structures to collapse and block breathing.  There are several different tongue procedures available.  Selection of a tongue base procedure depends on characteristics seen on physical exam.  Generally, procedures are aimed at removing bulky tissues in this area or preventing the back of the tongue from falling back during sleep.  Success rates for tongue surgery range from 50-62%3.    Hypoglossal Nerve Stimulation:  Hypoglossal nerve stimulation has recently received approval from the [place] Food and Drug Administration for the treatment of obstructive sleep apnea.  This is based on research showing that the system was safe and effective in treating sleep apnea6.  Results showed that the median AHI score decreased 68%, from 29.3 to 9.0. This therapy uses an implant system that senses breathing patterns and delivers mild stimulation to airway muscles, which keeps the airway open during sleep.  The system consists of three fully implanted components: a small generator (similar in size to a pacemaker), a breathing sensor, and a stimulation lead.   Using a small handheld remote, a patient turns the therapy on before bed and off upon awakening.    Candidates for this device must be greater than 18 years of age, have moderate to severe JASON (AHI between 15-65), BMI less than 35, have tried CPAP/oral appliance for at least 8 weeks without success, and have appropriate upper airway anatomy (determined by a sleep endoscopy performed by Dr. Leroy Catse).    Hypoglossal Nerve Stimulation Pathway:    The sleep surgeon s office will work with the patient through the insurance prior-authorization process (including communications and appeals).    Nasal Procedures:  Nasal obstruction can interfere with nasal breathing during the day and night.  Studies have shown that relief of nasal obstruction can improve the ability of some patients to tolerate positive airway pressure therapy for obstructive sleep apnea1.  Treatment options include medications such as nasal saline, topical corticosteroid and antihistamine sprays, and oral medications such as antihistamines or decongestants. Non-surgical treatments can include external nasal dilators for selected patients. If these are not successful by themselves, surgery can improve the nasal airway either alone or in combination with these other options.      Combination Procedures:  Combination of surgical procedures and other treatments may be recommended, particularly if patients have more than one area of narrowing or persistent positional disease.  The success rate of combination surgery ranges from 66-80%2,3.    References  John LUONG. The Role of the Nose in Snoring and Obstructive Sleep Apnoea: An Update.  Eur Arch Otorhinolaryngol. 2011; 268: 1365-73.   Jaden SM; Kailey JA; Florin JR; Pallanch JF; Ashley MB; Regine SG; Diogenes ZUNIGA. Surgical modifications of the upper airway for obstructive sleep apnea in adults: a systematic review and meta-analysis. SLEEP 2010;33(10):6151-7537. Betty OSBORNE. Hypopharyngeal surgery in  obstructive sleep apnea: an evidence-based medicine review.  Arch Otolaryngol Head Neck Surg. 2006 Feb;132(2):206-13.  Jose Miguel YH1, Belinda Y, Ramos RYAN. The efficacy of anatomically based multilevel surgery for obstructive sleep apnea. Otolaryngol Head Neck Surg. 2003 Oct;129(4):327-35.  Betty OSBORNE, Goldberg A. Hypopharyngeal Surgery in Obstructive Sleep Apnea: An Evidence-Based Medicine Review. Arch Otolaryngol Head Neck Surg. 2006 Feb;132(2):206-13.  Mercedes SHEN et al. Upper-Airway Stimulation for Obstructive Sleep Apnea.  N Engl J Med. 2014 Jan 9;370(2):139-49.  Estrada Y et al. Increased Incidence of Cardiovascular Disease in Middle-aged Men with Obstructive Sleep Apnea. Am J Respir Crit Care Med; 2002 166: 159-165  Heinpasha HUDSON et al. Studying Life Effects and Effectiveness of Palatopharyngoplasty (SLEEP) study: Subjective Outcomes of Isolated Uvulopalatopharyngoplasty. Otolaryngol Head Neck Surg. 2011; 144: 623-631.        WHAT IF I ONLY HAVE SNORING?    Mandibular advancement devices, lateral sleep positioning, long-term weight loss and treatment of nasal allergies have been shown to improve snoring.  Exercising tongue muscles with a game (Verastemttps://[employer]etheon.[employer]works/us/messi/soundly-reduce-snoring/[email or web address]) or stimulating the tongue during the day with a device (https://doi.org/10.3390/cya34540308) have improved snoring in some individuals.    Remember to Drive Safe... Drive Alive     Sleep health profoundly affects your health, mood, and your safety.  Thirty three percent of the population (one in three of us) is not getting enough sleep and many have a sleep disorder. Not getting enough sleep or having an untreated / undertreated sleep condition may make us sleepy without even knowing it. In fact, our driving could be dramatically impaired due to our sleep health. As your provider, here are some things I would like you to know about driving:     Here are some warning signs for impairment and dangerous drowsy driving:               -Having been awake more than 16 hours               -Looking tired               -Eyelid drooping              -Head nodding (it could be too late at this point)              -Driving for more than 30 minutes     Some things you could do to make the driving safer if you are experiencing some drowsiness:              -Stop driving and rest              -Call for transportation              -Make sure your sleep disorder is adequately treated     Some things that have been shown NOT to work when experiencing drowsiness while driving:              -Turning on the radio              -Opening windows              -Eating any  distracting  /  entertaining  foods (e.g., sunflower seeds, candy, or any other)              -Talking on the phone      Your decision may not only impact your life, but also the life of others. Please, remember to drive safe for yourself and all of us.                    CBT-I Introductory Module    Understanding Sleep and Insomnia    Most people have trouble sleeping at some point in their life.   Chronic insomnia means you have had trouble falling asleep and/or staying asleep for at least the past three months.  Despite allowing enough time for sleep, it is affecting how you feel. You are not alone.  It is estimated that 10-15% of adults experience chronic insomnia.     Cognitive Behavioral Therapy for Insomnia (CBT-I)    Consistent with the guidelines of the American College of Physicians, [facility] recommends  Cognitive Behavioral Therapy for Insomnia (CBT-I) as the first-line treatment for insomnia.  CBT-I targets factors that lead to long-term insomnia:    Behavioral Conditioning    When you lay awake in bed over many nights, your body actually becomes trained or 'conditioned' to be awake during the night.  It makes the bed associated with alertness instead of sleepiness.    Habits that weaken your body's sleep drive and circadian sleep rhythm    Changing sleep schedules,  extended time in bed, using mobile devices and computers close to bedtime, and naps too late in the day can harm your sleep at night.    Emotional and Physical Arousal    Things such as worrying about sleep, stress, drinking too much caffeine, and not winding down before bed can interfere with your body's natural sleep drive.    Understanding Sleep and Insomnia    [facility] CBT-I is a four-part program that teaches you the skills needed for a better night's sleep. The first step in your program is learning a bit about sleep and insomnia.      The Basics of Sleep    How does sleep help us?    Sleep, like food and water, is something we need every day. The purpose of sleep is still not exactly clear, but sleep experts agree we need consistent quality sleep to function at our best. There is evidence that sleep helps maintain brain and body functions.  It helps maintain thinking ability and mood.  Sleep is actually a very active part of life. Sleep occurs in four stages that cycle every  minutes throughout the night. We get our deepest sleep during the first few hours of sleep.  During the last half of our sleep, we usually get the bulk of our REM (Rapid Eye Movement) sleep.  REM sleep is when most of our dreaming occurs.    How much sleep do we need?    Sleep needs vary from person to person. Most adults need between 6-8 hours of sleep.  Sleeping too little or too much may be a health risk.  As we age, most people report their sleep gets lighter, earlier, shorter, and more restless.     What Controls Our Sleep?    The three things that regulate your sleep are your sleep drive, biological clock and your arousal system (emotional and physical).  Together these make us feel alert during the day and promote sleep at night.    Your sleep drive depends on how long you have been awake. It is lowest when you first wake up.  Sleep drive gradually increases as the day goes on.  The longer time you are awake the  easier it is to fall asleep.  Sleeping gradually reduces your sleep drive. That is why napping in the evening or close to bed can make it harder to sleep at night.    Your biological clock promotes wakefulness during the day and sleep at night.          From Nikolay et al. (2009). Evaluation and Management of Insomnia in the Psychiatric setting. Published Online: 19/1/2009; DOI: https://doi.org/[email or web address]/foc.7.4.[email or web address]      Understanding Insomnia    What causes insomnia?    Some people have a greater predisposition to developing insomnia due to genetics, personality or age. A host of things can trigger or precipitate it: jet lag, working a different shift, medication, or the onset of a medical or mental health condition.         Insomnia and Your Arousal System    Mental activity, emotions and physical symptoms can make your brain too active to sleep by masking the strength of your sleep drive. Your brain's arousal system not only triggers insomnia, it plays a role in maintaining it as well.  Common sources of arousal include:    Worry about sleep in bed  An active mind concerned about unfinished tasks  Anxiety, Stress and Depression  Pain     What perpetuates insomnia?    Short-term insomnia can become chronic when you begin to feel fearful, worried and  on guard  about sleep loss. As you spend more time in bed or try forcing yourself to sleep your bed becomes linked with wakefulness.  This is why people with insomnia commonly report feeling tired before bed but suddenly more alert when getting into bed.  This type of  conditioning  along with unhelpful sleep habits maintains the insomnia even when the triggering event has resolved.    Tracking your Sleep    Sleep tracking is an essential part of training yourself to sleep better and monitoring your progress.  There are several ways to keep track of your sleep habits.     Insomnia  Ju    The Insomnia  ju is a convenient way to keep track of your  sleep prior to and during treatment.  Simply download the free messi on your Apple or Android phone and record your information each morning.   The data you enter should be your recollection of the past nigh of sleep. Do not watch or monitor the clock in the middle of the night while keeping your sleep diary.     The messi also includes training and sleep schedule recommendations.  We recommend you use only the tracking function unless instructed by your provider. You can email your data to yourself prior to your visit by using the [facility] User Data function found in the Settings Section.  It is important that you have your data available to review with your provider at the time of your visit.             [facility] Sleep Diary    You can also track your sleep using the [facility] paper sleep diary.  You can upload your sleep diary and send it via a [facility] message  or have it with you at the time of your visit.        Mobile and Wearable Sleep Tracking Devices    There are many sleep tracking devices and mobile applications that estimate the timing and quantity of sleep by measuring body movement.  Though many of these devices claim to measure the depth or stages of sleep, they cannot measure brain wave activity or other indicators required to determine the actual stages of sleep.  They are helpful in estimating the timing and total amount of time you sleep.    CBT-I and Sleeping Pills    Sleep medications can be helpful in the short-term but often stop working in the long-term.  Sleeping pills treat symptoms and not the underlying cause of insomnia.  They also can have side effects that last well into the day. Abruptly stopping sleeping pills can cause temporary rebound insomnia and lead to increased distress about sleeplessness.  This in turn strengthens the belief that pills are necessary for sleep.    Many patients choose to discontinue sleeping pills prior to beginning CBT-I.  You should talk to your  prescribing provider before tapering or discontinuing sleep medication.  [facility]  Cognitive Behavioral Therapy for Insomnia   Cognitive Training Module    Developing Healthy Sleep Thoughts    Insomnia is often is triggered by stressful events such as a change in employment, a separation, medical illness, or loss.  How you handle your sleep problem, mainly your thoughts and behaviors, determines in large part whether your sleeplessness is short -term or develops into chronic insomnia well after the stressful event is over.     This part of your program involves learning a set of skills to change negative and worrisome thoughts about sleep.   Insomnia is more likely to persist if you interpret the onset as a threat or loss of control.  Worry, fears and untrue beliefs about insomnia can become a vicious cycle.  Negative sleep thoughts activate the physical and emotional systems of your body.  This strengthens wakefulness and weakens your sleep system.  This in turn produces increased stress and pressure to sleep.  We call this unhelpful sleep effort.     Changing How You Think About Insomnia    We now know that our thoughts and attitudes affect our stress response.  Negative sleep thoughts can worsen your insomnia. They can lead to greater fear or anxiety about sleep, which in turn can aggravate your sleep problem. Thinking more positively and realistically about your sleep promotes its health and healing.     Myths about Sleep    People need 8 hours of sleep     This is untrue.  Sleep needs vary from person to person.  Most people need between 6-8 hours to feel alert during the day.  People who sleep 7 hours live longer than those who sleep 8 hours.  Research also suggests people who sleep 5 hours live longer than those who sleep 9 hours    Insomnia Causes Dementia     While there is lots of research happening looking into  various causes of dementia, there is currently no conclusive evidence that insomnia  causes dementia.  We do know that the rate of Insomnia is higher in a host of health conditions that have been associated with increased risk of certain types of dementia.  In general, people with primary insomnia perform similar to       normal sleepers in tests of neurocognitive ability.      Insomnia is the same as sleep deprivation    Sleep deprivation is lack of sleep due to not allowing enough time for sleep.  This is typically not true for insomnia where people have enough time for sleep and even extend their sleep time trying to get more sleep.  Most people with insomnia get about the same amount of sleep as normal sleepers.  The difference is that with insomnia the sleep is fragmented and less consolidated.       You are Getting More Sleep than You Think    Research using objective sleep tests reveal that people with insomnia get an average of one hour more sleep than they think. This is due in part because the brain misperceives Stage 1 and 2 sleep as being awake.  In addition, stress and arousal while awake in bed changes the brain's perception of time awake.    Examples of Unhealthy Sleep Thoughts    Negative sleep thoughts can have a profound impact on your ability to get a good night's sleep. Below are some examples of negative thoughts associated with insomnia that may sound familiar to you:    I must get 8 hours of sleep to function during the day.    I won't get to sleep tonight.    Insomnia is going to cause health problems.    I am dreading going to bed.    I woke up early again.  I know I won't get back to sleep.    The reason I feel terrible today is because of my insomnia.    I've totally lost control of my sleep.    I can't sleep without a sleeping pill.    Negative thoughts usually occur automatically and feel like a knee-jerk reaction.  They are often untrue or distorted, especially late in the evening as you become increasingly tired and others are asleep.      Changing Unhealthy Sleep  Thoughts    Now that you understand the impact that negative thoughts can have on your sleep, you are ready to change these unhelpful thoughts.  The strategy is powerful and simple:  By recognizing and replacing your negative thoughts about sleep with more accurate, positive thoughts, you will be less anxious and frustrated about your sleep. A more realistic and positive attitude about sleep will allow you to relax and sleep more easily through the night.         There are several important steps involved in changing your unhelpful and negative thoughts about sleep:      Examples of Healthy Sleep Thoughts    My work will not suffer much if I have a poor night's sleep.    I'm probably getting more sleep than I think.    Other things than my sleep affect my daytime functioning.    Because I didn't sleep well last night, I am more likely to sleep well tonight because of increased sleep drive that leads to deeper sleep.    Sleep requirements vary from one person to another.    If I don't sleep well, most of the time the worst that can happen is that my mood might not be as bright the next day.    If I awaken after about 5 hours of sleep, I have gotten the core sleep I need for the day.    I'm more likely to fall asleep the longer I've been awake    I'm more likely to fall asleep as my body temperature begins to decrease through the night.    My body's wakefulness system takes charge during the day to promote daytime functioning.    These sleep skills have worked for others, and they can work for me.    Other Recommendations for Changing Beliefs and Attitudes   About Your Sleep    Keep Realistic Expectations     There is a widespread belief that 8 hours of sleep is necessary to feel refreshed and function well during the day. Many believe that good sleep means never waking up at night.  Others come to expect that they should always wake up in the morning feeling full of energy.  Concerns may arise when your actual sleep  falls short of these expectations. Try to avoid placing undue pressure on yourself to achieve certain sleep levels.  It only increases anxiety about sleeping.  Focus on quality sleep not quantity of sleep.    Revise Your Thoughts about the Causes of Insomnia    There is a natural tendency to attribute our sleep problems completely to external factors such as a chemical imbalance, pain, aging or things over which we may have little control.  Although these factors may contribute to your insomnia, research shows CBT-I is beneficial even if they are present.    Don't Blame Insomnia for All Daytime Impairments      Many individuals blame insomnia for every symptom or concern they experience during the day from fatigue to lack of concentration. Though poor sleep may produce some of these symptoms, it us usually untrue that all daytime impairment is attributable to insomnia.  It is more often that other factors such as stress and co-occurring medical problems contribute more to how you feel during the day.      Don't Catastrophize      Catastrophic thinking means making a sleep mountain out of a molehill.  Some people believe poor sleep will have catastrophic consequences to their physical health, mental health and appearance. Others see insomnia as a complete loss of control.  These perceived consequences of insomnia often prompt people to seek medication or other treatment.  Keep in mind insomnia can be very unpleasant but for the most part is not dangerous.    Don't Focus on Sleep     Some people reduce their activity level because of poor sleep.  Although sleep is a necessary part of life, don't make it the focus of your thoughts and concern. Trust that if you engage in health sleep habits, your body will give you the sleep it needs.  Make sure you continue your normal activities despite your insomnia.    Never Try to Sleep      Of all the habits the most important one is this:  Never try to force yourself to sleep.   Doing so usually backfires and makes things worse. Instead, focus on keeping to your prescribed sleep schedule, getting up at the same time every day and using the bed only for sleep.     What are Your Three Top Negative Thoughts About Your Sleep?    Negative Thought                        Resulting Feeling                   Alternate Thought  I must get 8 hours of sleep                   Fear                      People need different amounts   night or my health will suffer                                              of sleep and sleep time varies                                                                                             Somewhat from night to night  1._____________________________________________________________________    2._____________________________________________________________________    3._____________________________________________________________________    Portions of this module contains concepts and edited content developed by   Rogers Ramirez PsyD, JOEYP, SEGUNDOM and used with permission.      Your BMI is Body mass index is 29.14 kg/m .    What is BMI?  Body mass index (BMI) is one way to tell whether you are at a healthy weight, overweight, or obese. It measures your weight in relation to your height.  A BMI of 18.5 to 24.9 is in the healthy range. A person with a BMI of 25 to 29.9 is considered overweight, and someone with a BMI of 30 or greater is considered obese.  Another way to find out if you are at risk for health problems caused by overweight and obesity is to measure your waist. If you are a woman and your waist is more than 35 inches, or if you are a man and your waist is more than 40 inches, your risk of disease may be higher.  More than two-thirds of [place] adults are considered overweight or obese. Being overweight or obese increases the risk for further weight gain.  Excess weight may lead to heart disease and diabetes. Creating and following plans for healthy eating  and physical activity may help you improve your health.    Methods for maintaining or losing weight.  Weight control is part of healthy lifestyle and includes exercise, emotional health, and healthy eating habits.  Careful eating habits lifelong is the mainstay of weight control.  Though there are significant health benefits from weight loss, long-term weight loss with diet alone may be very difficult to achieve- studies show long-term success with dietary management in less than 10% of people. Attaining a healthy weight may be especially difficult to achieve in those with severe obesity. In some cases, medications, devices and surgical management might be considered.    What can you do?  If you are overweight or obese and are interested in methods for weight loss, you should discuss this with your provider. In addition, we recommend that you review healthy life styles and methods for weight loss available through the [employer] patient information sites:   http://win.niddk.nih.gov/publications/index.htm

## 2022-12-28 NOTE — PROGRESS NOTES
Name: Coty Erwin MRN# [id number]   Age: 37 year old YOB: 1985     Date of Consultation: December 28, 2022  Consultation is requested by: Mary Ann Taylor MD  [address] [place] [address] Mary Ann Taylor  Primary care provider: Mary Ann Taylor       Reason for Sleep Consult:     Coty Erwin is sent by Mary Ann Taylor for a sleep consultation regarding sleep difficulty.      Patient s Reason for visit  Coty Erwin main reason for visit:.  Falling asleep  Patient states problem(s) started: During covid 19  Coty Erwin's goals for this visit: Learn more about sleep disorders. Determine if I have one           Assessment and Plan:     Summary Sleep Diagnoses:    Insomnia complaint    Clinical signs and symptoms of obstructive sleep apnea    Comorbid Diagnoses:    Temporomandibular joint disease    Mood disturbance with anxiety  Summary Recommendations(things to be done):    Iron testing will be done today and we will notify you of the results: If your ferritin is below 75 we will recommend that you start on Vitron-C on an empty stomach every morning.  If you have gastrointestinal intolerance you may take it with food although it is less well absorbed.    Keep a sleep diary over the next 2 weeks and review the information in your after visit summary regarding cognitive approaches to insomnia in preparation for your first clinic visit in the insomnia program with Kody Euceda.    Also review information and after visit summary regarding sleep apnea treatments as well as the YouTube home sleep testing video at the beginning of the summary.    You will be called to schedule home sleep testing and will be contacted after his results of these are available.  Please review the my chart messages for the results of your study and recommendations.        Summary Counseling (items discussed):    We reviewed the role of cognitive approaches in treating insomnia as well  as addressing underpinning causes including possible sleep apnea and restlessness syndromes.         HISTORY PRESENT ILLNESS:     Coty Erwin is a 37 year old year old with difficulty initiating and maintaining sleep over the last 2 years precipitated in part by the stresses related to the pandemic and in the setting of chronic anxiety since adolescence currently engaged in DBT therapy.  She has difficulty initiating sleep and maintaining sleep on a airbase basis which she ascribes to sensation of anxiety with tightness in her neck and shoulders and unresolving anxiety until she moves to a separate room to sleep.  She has been in therapy for anxiety and recognizes emotional regulation through her adolescence.  She is reported to have a chirping noises with snoring occasionally at night and has a prior history of upper airway abnormalities including overbite corrected with maxillary surgery as well as symptoms of chronic nasal congestion which she attributes to household allergies.  This patient also endorses periods of motor restlessness as a young adult with frequent repositioning to achieve comfortable position before sleep initiation over about 20 to 30 minutes.  She denies typical restless leg syndrome currently.           SLEEP-WAKE SCHEDULE:      Work/School Days: Patient goes to school/work: Yes   Usually gets into bed at 10 pm  Takes patient about 20-30 min to fall asleep  Has trouble falling asleep Daily nights per week  Wakes up in the middle of the night Daily times.  Wakes up due to Use the bathroom, Anxiety  She has trouble falling back asleep Only when anxiety us high times a week.   It usually takes 20-30 min to get back to sleep  Patient is usually up at 7 am  Uses alarm: Yes    Weekends/Non-work Days/All Other Days:  Usually gets into bed at 10 pm   Takes patient about 20-30 min to fall asleep  Patient is usually up at 8 am  Uses alarm: Yes    Sleep Need  Patient gets  8-9 hrs sleep on  average   Patient thinks she needs about 8 hrs sleep    Coty Erwin prefers to sleep in this position(s): Back   Patient states they do the following activities in bed: Read    Naps  Patient takes a purposeful nap Hardly ever times a week and naps are usually Less than 1 hr in duration  She feels better after a nap: Yes  She dozes off unintentionally Never days per week  Patient has had a driving accident or near-miss due to sleepiness/drowsiness: No      SLEEP DISRUPTIONS:      Breathing/Snoring    Snoring:Yes  Other people complain about her snoring: No  Others observeshe stops breathing in her sleep: No  She has issues with the following: Morning mouth dryness, Stuffy nose when you wake up, Getting up to urinate more than once      Movement:    Pain, discomfort, with an urge to move:  No  Happens when she is resting:  No  Happens more at night:  No  Patient has been told she kicks her legs at night:  No       Behaviors in Wakefulness/Sleep:    Dream enactment   No  Sleep eating   No  Bruxism  No                    Coty Erwin has experienced the following behaviors while sleeping: Recurring Nightmares, Teeth grinding  She has experienced sudden muscle weakness during the day: No      Is there anything else you would like your sleep provider to know: Were thinking about trying to get pregnant        CAFFEINE AND OTHER SUBSTANCES:    Patient consumes caffeinated beverages per day:  2 coffee  Last caffeine use is usually: 11 am  List of any prescribed or over the counter stimulants that patient takes: None  List of any prescribed or over the counter sleep medication patient takes: Melatonin as needed  List of previous sleep medications that patient has tried: None  Patient drinks alcohol to help them sleep: No  Patient drinks alcohol near bedtime: No    Family History:  Patient has a family member been diagnosed with a sleep disorder: No                 SCALES:       EPWORTH SLEEPINESS SCALE       Critz Sleepiness Scale ( MARCIO Brothers  [id number]<br>ESS - [place]/English - Final version - 21 Nov 07 - [facility] [place].) 12/28/2022   Sitting and reading Would never doze   Watching TV Would never doze   Sitting, inactive in a public place (e.g. a theatre or a meeting) Would never doze   As a passenger in a car for an hour without a break Would never doze   Lying down to rest in the afternoon when circumstances permit Would never doze   Sitting and talking to someone Would never doze   Sitting quietly after a lunch without alcohol Would never doze   In a car, while stopped for a few minutes in traffic Would never doze   Critz Score (MC) 0   Critz Score (Sleep) 0         INSOMNIA SEVERITY INDEX (SELENA)      Insomnia Severity Index (SELENA) 12/28/2022   Difficulty falling asleep 2   Difficulty staying asleep 2   Problems waking up too early 0   How SATISFIED/DISSATISFIED are you with your CURRENT sleep pattern? 3   How NOTICEABLE to others do you think your sleep problem is in terms of impairing the quality of your life? 1   How WORRIED/DISTRESSED are you about your current sleep problem? 4   To what extent do you consider your sleep problem to INTERFERE with your daily functioning (e.g. daytime fatigue, mood, ability to function at work/daily chores, concentration, memory, mood, etc.) CURRENTLY? 1   SELENA Total Score 13       Guidelines for Scoring/Interpretation:  Total score categories:  0-7 = No clinically significant insomnia   8-14 = Subthreshold insomnia   15-21 = Clinical insomnia (moderate severity)  22-28 = Clinical insomnia (severe)  Used via courtesy of www."[employer]th.va.gov with permission from Cassius Willis PhD., [facility]      STOP BANG     STOP BANG Questionnaire (  2008, the American Society of Anesthesiologists, Inc. [employer] 12/28/2022   1. Snoring - Do you snore loudly (louder than talking or loud enough to be heard through closed doors)? No   2. Tired - Do you  "often feel tired, fatigued, or sleepy during daytime? No   3. Observed - Has anyone observed you stop breathing during your sleep? No   4. Blood pressure - Do you have or are you being treated for high blood pressure? No   5. BMI - BMI more than 35 kg/m2? Yes   6. Age - Age over 50 yr old? No   7. Neck circumference - Neck circumference greater than 40 cm? No   8. Gender - Gender male? No   STOP BANG Score (MC): 2 (Low risk of JASON)   Neck Cir (cm) Clinic: 37   B/P Clinic: 108/79   BMI Clinic: 29.14         GAD7    BALBINA-7  12/31/2021   1. Feeling nervous, anxious, or on edge 1   2. Not being able to stop or control worrying 1   3. Worrying too much about different things 1   4. Trouble relaxing 0   5. Being so restless that it is hard to sit still 1   6. Becoming easily annoyed or irritable 0   7. Feeling afraid, as if something awful might happen 0   BALBINA-7 Total Score 4         CAGE-AID    No flowsheet data found.    CAGE-AID reprinted with permission from the [place] Medical Journal, KATHERINE Mar. and NISHANT Butler, \"Conjoint screening questionnaires for alcohol and drug abuse\" [place] Medical Journal 94: 135-140, 1995.      PATIENT HEALTH QUESTIONNAIRE-9 (PHQ - 9)    PHQ-9 ([employer]) 12/31/2021   1.  Little interest or pleasure in doing things 0   2.  Feeling down, depressed, or hopeless 0   3.  Trouble falling or staying asleep, or sleeping too much 1   4.  Feeling tired or having little energy 0   5.  Poor appetite or overeating 0   6.  Feeling bad about yourself 0   7.  Trouble concentrating 1   8.  Moving slowly or restless 0   9.  Suicidal or self-harm thoughts 0   PHQ-9 Total Score 2   1.  Little interest or pleasure in doing things Not at all   2.  Feeling down, depressed, or hopeless Not at all   3.  Trouble falling or staying asleep, or sleeping too much Several days   4.  Feeling tired or having little energy Not at all   5.  Poor appetite or overeating Not at all   6.  Feeling bad about yourself Not at all "   7.  Trouble concentrating Several days   8.  Moving slowly or restless Not at all   9.  Suicidal or self-harm thoughts Not at all   PHQ-9 via [facility] TOTAL SCORE-----> 2 (Minimal depression)   Difficulty at work, home, or with people -       Developed by Kathrin Ruiz, Delilah Cates, Davey Ludwig and colleagues, with an educational hamilton from [employer]. No permission required to reproduce, translate, display or distribute.        Allergies:    Allergies   Allergen Reactions     Animal Dander Unknown     Dust Mites Unknown     Lactose GI Disturbance and Itching     Other Environmental Allergy      Sulfa Drugs Rash     As a child            Problem List:     Patient Active Problem List   Diagnosis     Anxiety     Mild intermittent asthma without complication     Family history of melanoma     Multiple pigmented nevi     Eczema, unspecified type     TMJ (temporomandibular joint syndrome)     BMI 28.0-28.9,adult     Snoring     Episodic tension-type headache, not intractable     Irritable bowel syndrome, unspecified type     Shoulder pain, right     Sleep disorder            MEDICATIONS:     Current Outpatient Medications   Medication Sig Dispense Refill     albuterol (PROAIR HFA/PROVENTIL HFA/VENTOLIN HFA) 108 (90 Base) MCG/ACT inhaler Inhale 2 puffs into the lungs every 6 hours as needed for shortness of breath / dyspnea or wheezing 18 g 1     Prenatal MV-Min-Fe Fum-FA-DHA (PRENATAL 1 PO)        UNABLE TO FIND 50 mg MEDICATION NAME: Calm 50 mg       doxylamine (UNISOM) 25 MG TABS tablet Take 1 tablet (25 mg) by mouth nightly as needed for sleep (Patient not taking: Reported on 12/28/2022) 30 tablet 1     melatonin 3 MG tablet Take 1 tablet (3 mg) by mouth nightly as needed for sleep (Patient not taking: Reported on 12/28/2022) 30 tablet 1       Problem List:  Patient Active Problem List    Diagnosis Date Noted     Sleep disorder 09/23/2022     Priority: Medium     Shoulder pain, right 09/09/2022      Priority: Medium     Episodic tension-type headache, not intractable 04/06/2022     Priority: Medium     Irritable bowel syndrome, unspecified type 04/06/2022     Priority: Medium     TMJ (temporomandibular joint syndrome) 01/01/2022     Priority: Medium     BMI 28.0-28.9,adult 01/01/2022     Priority: Medium     Snoring 01/01/2022     Priority: Medium     Anxiety 12/31/2021     Priority: Medium     Family history of melanoma 12/31/2021     Priority: Medium     Multiple pigmented nevi 12/31/2021     Priority: Medium     Eczema, unspecified type 12/31/2021     Priority: Medium     Mild intermittent asthma without complication      Priority: Medium        Past Medical/Surgical History:  Past Medical History:   Diagnosis Date     CARDIOVASCULAR SCREENING; LDL GOAL LESS THAN 160 4/17/2012     Contraceptive management 4/18/2012     Jaw pain 12/31/2021     Nonintractable episodic headache, unspecified headache type 1/1/2022     Plantar warts 7/6/2012     Uncomplicated asthma      Past Surgical History:   Procedure Laterality Date     MANDIBLE SURGERY  08/2004     ORTHOPEDIC SURGERY  2004    Jaw surgery     PE TUBES      as child.       Social History:  Social History     Socioeconomic History     Marital status:      Spouse name: Not on file     Number of children: 0     Years of education: Not on file     Highest education level: Not on file   Occupational History     Not on file   Tobacco Use     Smoking status: Never     Smokeless tobacco: Never     Tobacco comments:     Never smoked   Vaping Use     Vaping Use: Never used   Substance and Sexual Activity     Alcohol use: Not Currently     Comment: rarely     Drug use: No     Sexual activity: Yes     Partners: Male     Birth control/protection: Condom   Other Topics Concern     Parent/sibling w/ CABG, MI or angioplasty before 65F 55M? No   Social History Narrative    Dec 2022: lives with  and dog.  to leave dec 21st to dad in [place] as father in law  has some kidn of intestinal cancer         Dec 2021: lives with  who is of Monegasque origin, can speak and read some Rwandan. 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     Social Determinants of Health     Financial Resource Strain: Not on file   Food Insecurity: Not on file   Transportation Needs: Not on file   Physical Activity: Not on file   Stress: Not on file   Social Connections: Not on file   Intimate Partner Violence: Not on file   Housing Stability: Not on file       Family History:  Family History   Problem Relation Age of Onset     Osteoporosis Mother      Varicose Veins Mother      Hypertension Father      Allergies Father      Eye Disorder Father         Glasses     Anxiety Disorder Sister      Anxiety Disorder Sister      Thyroid Disease Maternal Grandmother      Heart Disease Maternal Grandfather         Pace Maker     Diabetes Maternal Grandfather         Type 2 late in life     Prostate Cancer Maternal Grandfather         Prostate     Eye Disorder Maternal Grandfather         Glasses     Hypertension Paternal Grandfather         Recently passed in December 2021     Colon Cancer Other         Mothers grandmother and great grandmother     Mental Illness Other         Bipolar     Osteoporosis Other         Aunt     Thyroid Disease Other         Maternal aunt     Obesity Other         Maternal cousin       Review of Systems:  A complete review of systems reviewed by me is negative with the exeption of what has been mentioned in the history of present illness.  In the last TWO WEEKS have you experienced any of the following symptoms?  Fevers: No  Night Sweats: No  Weight Gain: No  Pain at Night: No  Double Vision: No  Changes in Vision: No  Difficulty Breathing through Nose: No  Sore Throat in Morning: No  Dry Mouth in the Morning: No  Shortness of Breath Lying Flat: No  Shortness of Breath With Activity: No  Awakening  "with Shortness of Breath: No  Increased Cough: No  Heart Racing at Night: No  Swelling in Feet or Legs: No  Diarrhea at Night: No  Heartburn at Night: No  Urinating More than Once at Night: No  Losing Control of Urine at Night: No  Joint Pains at Night: No  Headaches in Morning: No  Weakness in Arms or Legs: No  Depressed Mood: No  Anxiety: Yes          Physical Examination:     Vitals: /79   Pulse 94   Resp 14   Ht 1.791 m (5' 10.5\")   Wt 93.4 kg (206 lb)   LMP 11/30/2022 (Exact Date)   SpO2 98%   BMI 29.14 kg/m    BMI= Body mass index is 29.14 kg/m .  Mandibular profile mild retrognathia  Good dentition  Mallampati Class: III.  Tonsillar Stage: 1  hidden by pillars.  GENERAL: Healthy, alert and no distress  EYES: Eyes grossly normal to inspection.  No discharge or erythema, or obvious scleral/conjunctival abnormalities.  RESP: No audible wheeze, cough, or visible cyanosis.  No visible retractions or increased work of breathing.    SKIN: Visible skin clear. No significant rash, abnormal pigmentation or lesions.  NEURO: Cranial nerves grossly intact.  Mentation and speech appropriate for age.  PSYCH: Mentation appears normal, affect normal/bright, judgement and insight intact, normal speech and appearance well-groomed.    Neck Cir (cm): 37 cm             Data: All pertinent previous laboratory data reviewed     Recent Labs   Lab Test 12/12/22  0904 12/31/21  1424    137   POTASSIUM 4.4 3.9   CHLORIDE 105 105   CO2 27 27   ANIONGAP 8 5   GLC 95 81   BUN 11.0 11   CR 0.97* 0.86   KENDALL 9.1 9.0       Recent Labs   Lab Test 12/12/22  0904   WBC 6.0   RBC 4.49   HGB 12.7   HCT 39.4   MCV 88   MCH 28.3   MCHC 32.2   RDW 13.9          Recent Labs   Lab Test 12/12/22  0904   PROTTOTAL 6.9   ALBUMIN 4.2   BILITOTAL 0.3   ALKPHOS 36   AST 20   ALT 8*       TSH   Date Value   12/12/2022 1.22 uIU/mL   12/31/2021 1.77 mU/L   07/15/2020 2.05 mU/L       No results found for: UAMP, UBARB, BENZODIAZEUR, " UCANN, UCOC, OPIT, UPCP    Iron Saturation Index   Date/Time Value Ref Range Status   07/15/2020 08:47 AM 20 15 - 46 % Final           RAMO LAZO MD 12/28/2022     Total time spent reviewing medical records including previous testing and interpretation as well as direct patient contact and documentation on this date:

## 2023-01-24 ASSESSMENT — SLEEP AND FATIGUE QUESTIONNAIRES
HOW LIKELY ARE YOU TO NOD OFF OR FALL ASLEEP WHILE SITTING INACTIVE IN A PUBLIC PLACE: WOULD NEVER DOZE
HOW LIKELY ARE YOU TO NOD OFF OR FALL ASLEEP WHEN YOU ARE A PASSENGER IN A CAR FOR AN HOUR WITHOUT A BREAK: WOULD NEVER DOZE
HOW LIKELY ARE YOU TO NOD OFF OR FALL ASLEEP WHILE LYING DOWN TO REST IN THE AFTERNOON WHEN CIRCUMSTANCES PERMIT: WOULD NEVER DOZE
HOW LIKELY ARE YOU TO NOD OFF OR FALL ASLEEP WHILE WATCHING TV: WOULD NEVER DOZE
HOW LIKELY ARE YOU TO NOD OFF OR FALL ASLEEP WHILE SITTING QUIETLY AFTER LUNCH WITHOUT ALCOHOL: WOULD NEVER DOZE
HOW LIKELY ARE YOU TO NOD OFF OR FALL ASLEEP IN A CAR, WHILE STOPPED FOR A FEW MINUTES IN TRAFFIC: WOULD NEVER DOZE
HOW LIKELY ARE YOU TO NOD OFF OR FALL ASLEEP WHILE SITTING AND READING: WOULD NEVER DOZE
HOW LIKELY ARE YOU TO NOD OFF OR FALL ASLEEP WHILE SITTING AND TALKING TO SOMEONE: WOULD NEVER DOZE

## 2023-01-25 ENCOUNTER — OFFICE VISIT (OUTPATIENT)
Dept: SLEEP MEDICINE | Facility: CLINIC | Age: 38
End: 2023-01-25
Payer: COMMERCIAL

## 2023-01-25 DIAGNOSIS — G47.33 OSA (OBSTRUCTIVE SLEEP APNEA): ICD-10-CM

## 2023-01-25 PROCEDURE — G0399 HOME SLEEP TEST/TYPE 3 PORTA: HCPCS | Performed by: INTERNAL MEDICINE

## 2023-01-26 ENCOUNTER — DOCUMENTATION ONLY (OUTPATIENT)
Dept: SLEEP MEDICINE | Facility: CLINIC | Age: 38
End: 2023-01-26
Payer: COMMERCIAL

## 2023-01-26 ENCOUNTER — MYC MEDICAL ADVICE (OUTPATIENT)
Dept: FAMILY MEDICINE | Facility: CLINIC | Age: 38
End: 2023-01-26

## 2023-01-27 NOTE — PROGRESS NOTES
HST POST-STUDY QUESTIONNAIRE    1. What time did you go to bed?  9  2. How long do you think it took to fall asleep?  10 min  3. What time did you wake up to start the day?  6 am  4. Did you get up during the night at all?  yes  5. If you woke up, do you remember approximately what time(s)? 12:30  6. Did you have any difficulty with the equipment?  Yes equipment on finger was tight and got warm, so I moved it to a different finger  7. Did you us any type of treatment with this study?  Other my calm medicine  8. Was the head of the bed elevated? No  9. Did you sleep in a recliner?  No  10. Did you stop using CPAP at least 3 days before this test?  NA  11. Any other information you'd like us to know?

## 2023-01-27 NOTE — PROGRESS NOTES
This HSAT was performed using a Noxturnal T3 device which recorded snore, sound, movement activity, body position, nasal pressure, oronasal thermal airflow, pulse, oximetry and both chest and abdominal respiratory effort. HSAT data was restricted to the time patient states they were in bed.     HSAT was scored using 1B 4% hypopnea rule.     HST AHI (Non-PAT): 1.1  Snoring was reported as none.  Time with SpO2 below 89% was 0 minutes.   Overall signal quality was good.     Pt will follow up with sleep provider to determine appropriate therapy.

## 2023-01-30 NOTE — TELEPHONE ENCOUNTER
Dr. Taylor--    When do you want to see pt for follow-up?     I will suggest she calls to schedule since having myChart difficulties    From your 12.12 visit scheduling order it says she should follow-up 12/12/23, pt thinking you needed to see her in 2-3 months    PRIMARY CARE FOLLOW-UP SCHEDULINGExpected 12/12/2023  Follow-up with whom? Me  Follow-Up for what? Adult Preventive  Any Additional Chronic Condition Management? Anxiety  How? In Person  Future, Expected: 12/12/2023 Approximate, Expires: 3/12/2024     Ordered on: 12/12/2022   Associated Dx: Routine history and physical examination of adult   Authorizing provider: Mary Ann Taylor MD Caitlin Umbreit, BSN RN  [facility]

## 2023-01-30 NOTE — TELEPHONE ENCOUNTER
The last visit was a preventive physical and at that visit I said return in 1 year for a preventive physical and sooner in an office visit for any new or persistent or worsening concerns.  COVID if having any new or persistent or worsening concerns she is free to schedule a office visit and should be able to get 1 in in the next few weeks like in March  Reminder in my chart is likely linked to 1 year preventive physical and that is why it is giving her December timeline.  She may have to choose a different option in my chart to choose  office visit or just may be one of the staff can help

## 2023-01-30 NOTE — PROCEDURES
"HOME SLEEP STUDY INTERPRETATION        Patient: Coty Doyle  MRN: [id number]  YOB: 1985  Study Date: 1/25/2023  PCP/Referring Provider: Mary Ann Taylor;   Ordering Provider: Jose Deal MD         Indications for Home Study: Coty Doyle is a 38 year old female with a history of insomnia with low ferritin level and mood disturbance who presents with symptoms suggestive of obstructive sleep apnea.    Estimated body mass index is 29.14 kg/m  as calculated from the following:    Height as of 12/28/22: 1.791 m (5' 10.5\").    Weight as of 12/28/22: 93.4 kg (206 lb).  Total score - [place]: 0 (1/24/2023  6:57 PM)  STOP-BANG: 3/8        Data: A full night home sleep study was performed recording the standard physiologic parameters including body position, movement, sound, nasal pressure, thermal oral airflow, chest and abdominal movements with respiratory inductance plethysmography, and oxygen saturation by pulse oximetry. Pulse rate was estimated by oximetry recording. This study was considered adequate based on > 4 hours of quality oximetry and respiratory recording. As specified by the AASM Manual for the Scoring of Sleep and Associated events, version 2.3, Rule VIII.D 1B, 4% oxygen desaturation scoring for hypopneas is used as a standard of care on all home sleep apnea testing.        Analysis Time: 488 minutes        Respiration:   Sleep Associated Hypoxemia: sustained hypoxemia was not present. Baseline oxygen saturation was 96%.  Time with saturation less than or equal to 88% was 0 minutes. The lowest oxygen saturation was 88%.   Snoring: Snoring was absent.  Respiratory events: The home study revealed a presence of 0 obstructive apneas and 7 mixed and central apneas. There were 2 hypopneas resulting in a combined apnea/hypopnea index [AHI] of 1 events per hour.  AHI was 1 per hour supine, 0 per hour prone, 4 per hour on left side, and 0 per hour on right side.   Pattern: Excluding " events noted above, respiratory rate and pattern was Normal.       Position: Percent of time spent: supine -68%, prone -0%, on left -21%, on right -11%.      Heart Rate: By pulse oximetry normal rate was noted.       Assessment:     No significant obstructive sleep apnea.    Sleep associated hypoxemia was not present.    Recommendations:     Evaluation and management of insomnia.    Suggest optimizing sleep hygiene and avoiding sleep deprivation.    Weight management.        Diagnosis Code(s): .    RAMO LAZO MD, January 30, 2023   Diplomate, American Board of Internal Medicine, Sleep Medicine

## 2023-02-06 ENCOUNTER — MYC MEDICAL ADVICE (OUTPATIENT)
Dept: SLEEP MEDICINE | Facility: CLINIC | Age: 38
End: 2023-02-06
Payer: COMMERCIAL

## 2023-02-06 ENCOUNTER — MYC MEDICAL ADVICE (OUTPATIENT)
Dept: FAMILY MEDICINE | Facility: CLINIC | Age: 38
End: 2023-02-06
Payer: COMMERCIAL

## 2023-02-07 NOTE — TELEPHONE ENCOUNTER
Dr. Taylor--    See pts message about iron supplementation. Taking iron-C and also thinks there is iron in her multivitamin. Given hx of low iron intake, what dose would you recommend?    JEYSON Benjamin RN  [facility]

## 2023-02-07 NOTE — TELEPHONE ENCOUNTER
Patients sleep study showed no significant sleep apnea.  Is concerned about her respiratory events.  Office visit scheduled not until July.  Might need some reassurance from provider.

## 2023-02-08 NOTE — TELEPHONE ENCOUNTER
Stool color changes with iron intake that's not worrisome  Multivitamin has some iron  But ferritin was 22 and sleep provider recocmended adding vitron c as less than 75 with sleep concerns.   Should be ok but only way to tell if getting enough and not too much would be to repeat the ferritin with them again in 6 week sof taking suppliment or whenever they told you to get it rechecked.

## 2023-02-08 NOTE — CONFIDENTIAL NOTE
Telephone call to/8/2023;  Given the absence of snoring and regularity of the breathing pattern we have informed the patient that doing polysomnography at this point would not be useful in managing her insomnia.  She has low ferritin and we will arrange for follow-up of her restless leg syndrome in conjunction with her enrollment in the insomnia program.  She has a tentative appointment in my clinic in July.

## 2023-02-13 ENCOUNTER — E-VISIT (OUTPATIENT)
Dept: FAMILY MEDICINE | Facility: CLINIC | Age: 38
End: 2023-02-13
Payer: COMMERCIAL

## 2023-02-13 DIAGNOSIS — N32.89 BLADDER IRRITATION: Primary | ICD-10-CM

## 2023-02-13 PROCEDURE — 99421 OL DIG E/M SVC 5-10 MIN: CPT | Performed by: FAMILY MEDICINE

## 2023-02-13 NOTE — TELEPHONE ENCOUNTER
Provider E-Visit time total (minutes): 10  HI Coty  The bladder can get irritated form many source  check for covid to be safe with a home test  I will put in a lab order for a urine test if you can do in a lab apt to rule out a urine infection  Follow up with sleep as planned  Stay well hydrated  Avoid acidic foods till better  Avoid carbonated drinks and caffeine till bladder better   Will make further recommendations once lab reviewed  Dr Taylor

## 2023-02-13 NOTE — PATIENT INSTRUCTIONS
Thank you for choosing us for your care. Given your symptoms, I would like you to do a lab-only visit to determine what is causing them.  I have placed the orders.  Please schedule an [place], or call [phone].  I will let you know when the results are back and next steps to take.

## 2023-02-14 ENCOUNTER — LAB (OUTPATIENT)
Dept: LAB | Facility: CLINIC | Age: 38
End: 2023-02-14
Payer: COMMERCIAL

## 2023-02-14 DIAGNOSIS — N32.89 BLADDER IRRITATION: ICD-10-CM

## 2023-02-14 LAB
ALBUMIN UR-MCNC: NEGATIVE MG/DL
APPEARANCE UR: CLEAR
BILIRUB UR QL STRIP: NEGATIVE
COLOR UR AUTO: YELLOW
GLUCOSE UR STRIP-MCNC: NEGATIVE MG/DL
HGB UR QL STRIP: NEGATIVE
KETONES UR STRIP-MCNC: NEGATIVE MG/DL
LEUKOCYTE ESTERASE UR QL STRIP: NEGATIVE
NITRATE UR QL: NEGATIVE
PH UR STRIP: 7 [PH] (ref 5–7)
SP GR UR STRIP: 1.02 (ref 1–1.03)
UROBILINOGEN UR STRIP-ACNC: 0.2 E.U./DL

## 2023-02-14 PROCEDURE — 81003 URINALYSIS AUTO W/O SCOPE: CPT

## 2023-02-14 NOTE — RESULT ENCOUNTER NOTE
Keron Doyle,  Your results came back and UA is normal you do not have a urine infection   If you have any further concerns please do not hesitate to contact us by message, phone or making an appointment.  Have a good day   Dr Brandon WEINSTEIN

## 2023-02-27 ENCOUNTER — E-VISIT (OUTPATIENT)
Dept: FAMILY MEDICINE | Facility: CLINIC | Age: 38
End: 2023-02-27
Payer: COMMERCIAL

## 2023-02-27 ENCOUNTER — MYC MEDICAL ADVICE (OUTPATIENT)
Dept: FAMILY MEDICINE | Facility: CLINIC | Age: 38
End: 2023-02-27

## 2023-02-27 DIAGNOSIS — R06.83 SNORING: Primary | ICD-10-CM

## 2023-02-27 PROCEDURE — 99421 OL DIG E/M SVC 5-10 MIN: CPT | Performed by: FAMILY MEDICINE

## 2023-02-27 NOTE — TELEPHONE ENCOUNTER
Provider E-Visit time total (minutes): 6  Hi Coty  Cortisol is a normal hormone and is expected to rise in am and in response to stress of any kind. So while we could check it, it wont . There is no med we would recommend to lower it, if high as you dont have a cortisol dysfunction its a normal response to stress and our treatment measures would be to manage the stress. But I can always refer you to endocrine to discuss if further. Sleep is important but stressing about sleep makes it a vicious cycle of more stress affecting sleep. Continue to work with your psychologist and psychiatrist regarding stress and sleep and see the sleep specialist as planned  Take care   Dr Taylor

## 2023-02-28 NOTE — TELEPHONE ENCOUNTER
Provider E-Visit time total (minutes):   Hi Coty   These are good questions you ask. Do you have a therapist you can discuss this more with or would you like referral to one   Stress can manifest many ways , every one is different   I'll put some information in your instructions  Dr Taylor

## 2023-02-28 NOTE — TELEPHONE ENCOUNTER
See RN response to patient's mychart message re:MADDIE BennettN RN  [facility]

## 2023-03-01 ENCOUNTER — ANCILLARY PROCEDURE (OUTPATIENT)
Dept: GENERAL RADIOLOGY | Facility: CLINIC | Age: 38
End: 2023-03-01
Attending: EMERGENCY MEDICINE
Payer: COMMERCIAL

## 2023-03-01 ENCOUNTER — OFFICE VISIT (OUTPATIENT)
Dept: URGENT CARE | Facility: URGENT CARE | Age: 38
End: 2023-03-01
Payer: COMMERCIAL

## 2023-03-01 VITALS
RESPIRATION RATE: 17 BRPM | TEMPERATURE: 98.8 F | SYSTOLIC BLOOD PRESSURE: 102 MMHG | WEIGHT: 206 LBS | HEART RATE: 108 BPM | DIASTOLIC BLOOD PRESSURE: 71 MMHG | BODY MASS INDEX: 28.84 KG/M2 | OXYGEN SATURATION: 98 % | HEIGHT: 71 IN

## 2023-03-01 DIAGNOSIS — M25.511 ACUTE PAIN OF RIGHT SHOULDER: ICD-10-CM

## 2023-03-01 DIAGNOSIS — S30.0XXA CONTUSION, BUTTOCK, INITIAL ENCOUNTER: ICD-10-CM
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
## 2023-03-01 DIAGNOSIS — M25.551 HIP PAIN, RIGHT: ICD-10-CM

## 2023-03-01 DIAGNOSIS — J06.9 UPPER RESPIRATORY TRACT INFECTION, UNSPECIFIED TYPE: ICD-10-CM

## 2023-03-01 LAB
DEPRECATED S PYO AG THROAT QL EIA: NEGATIVE
GROUP A STREP BY PCR: NOT DETECTED
SARS-COV-2 RNA RESP QL NAA+PROBE: POSITIVE

## 2023-03-01 PROCEDURE — 73502 X-RAY EXAM HIP UNI 2-3 VIEWS: CPT | Mod: TC | Performed by: RADIOLOGY

## 2023-03-01 PROCEDURE — U0005 INFEC AGEN DETEC AMPLI PROBE: HCPCS | Performed by: EMERGENCY MEDICINE

## 2023-03-01 PROCEDURE — 87651 STREP A DNA AMP PROBE: CPT | Performed by: EMERGENCY MEDICINE

## 2023-03-01 PROCEDURE — U0003 INFECTIOUS AGENT DETECTION BY NUCLEIC ACID (DNA OR RNA); SEVERE ACUTE RESPIRATORY SYNDROME CORONAVIRUS 2 (SARS-COV-2) (CORONAVIRUS DISEASE [COVID-19]), AMPLIFIED PROBE TECHNIQUE, MAKING USE OF HIGH THROUGHPUT TECHNOLOGIES AS DESCRIBED BY CMS-2020-01-R: HCPCS | Performed by: EMERGENCY MEDICINE

## 2023-03-01 PROCEDURE — 73030 X-RAY EXAM OF SHOULDER: CPT | Mod: TC | Performed by: RADIOLOGY

## 2023-03-01 PROCEDURE — 99213 OFFICE O/P EST LOW 20 MIN: CPT | Mod: CS | Performed by: EMERGENCY MEDICINE

## 2023-03-01 NOTE — PROGRESS NOTES
Assessment & Plan     Diagnosis:  (W19.XXXA) Fall, initial encounter  (primary encounter diagnosis)    (J06.9) Upper respiratory tract infection, unspecified type    (S30.0XXA) Contusion, buttock, initial encounter    (M25.511) Acute pain of right shoulder      Medical Decision Making:  Coty Doyle is a 38 year old female who presents for evaluation of right hip and shoulder pain after a mechanical slip and fall 2 days ago as well as cough, sore throat and headache since last night.     Signs and symptoms are consistent with a hip contusion and shoulder sprain/strain injury.  A broad differential was considered including sprain, strain, fracture, tendon rupture, nerve impingement/compromise, referred pain. Supportive outpatient management is indicated.  X-rays demonstrate no acute fractures or malalignment on my read, radiology notes as per below.  Rest, ice, and elevation treatment was discussed with the patient. The patients head to toe trauma exam is otherwise negative for serious underlying disease of the head, neck, chest, abdomen, extremities, pelvis.    Moreover, patient also with URI symptoms since last night.  There is no signs at this point of serious bacterial infection such as OM, RPA, epiglottitis, PTA, strep pharyngitis, pneumonia, meningitis, bacteremia, serious bacterial infection.  Rapid strep testing is negative with strep culture and COVID-19 PCR pending at this time.    Given clear lungs, no fever , no hypoxia and no respiratory distress I do not feel the patient needs a CXR at this point as the probability of bacterial pneumonia is very unlikely.       There are  gastrointestinal symptoms at this point and no signs of dehydration.  Close followup with PCP is indicated.  Go to ED for fever > 102 F, protracted vomiting, shortness of breath, chest pain or other concerns.  Patient verbalized understanding and agreement with the plan was discharged in stable condition.      Natanael Guillen  "ZARINA COCHRAN [facility]    Subjective     Coty Doyle is a 38 year old female who presents to clinic today for the following health issues:  Chief Complaint   Patient presents with     Urgent Care     Monday morning Pt fell on ice and has a big bruise on right butt cheek, bruise is getting bigger, also rt side of upper body got caught in the fall, body aches       Pharyngitis     Since last night, exposure to covid on Saturday did take at home and was negative      Headache     Since last night        HPI  Coty Doyle is a 38 year old female who presents for evaluation of right hip and shoulder pain after a mechanical slip and fall 2 days ago as well as cough, sore throat and headache since last night.  She says she has fallen multiple times the last week with due to slipping and falling on ice, 2 days ago fell onto her right buttock and states that she had a bruise that has increased in size over the right butt cheek which is concerning for her.  She also notes some right shoulder pain, has a history of chronic pain but would like this evaluated as well.      Moreover, the patient also notes she has been experiencing URI symptoms as noted above since last night, also having body aches but states that she is currently on her menstrual cycle.  Denies any difficulty swallowing or breathing, leg swelling, history of DVT/PE, chest pain, any numbness or weakness, back pain, bowel or bladder incontinence, fevers, nausea, vomiting, diarrhea or abdominal pain.      Review of Systems    See HPI    Objective      Vitals: /71 (BP Location: Right arm, Patient Position: Sitting, Cuff Size: Adult Large)   Pulse 108   Temp 98.8  F (37.1  C) (Oral)   Resp 17   Ht 1.791 m (5' 10.5\")   Wt 93.4 kg (206 lb)   SpO2 98%   BMI 29.14 kg/m        Patient Vitals for the past 24 hrs:   BP Temp Temp src Pulse Resp SpO2 Height Weight   03/01/23 1140 102/71 98.8  F (37.1  C) Oral 108 17 98 % 1.791 m " "(5' 10.5\") 93.4 kg (206 lb)       Vital signs reviewed by: Natanael Guillen PA-C    Physical Exam   Constitutional: Patient is alert and cooperative. No acute distress.  Head: Atraumatic.  No raccoon eyes or mauricio signs.  Neck: Normal range of motion.  No midline C-spine tenderness to palpation.  Ears: Right TM is normal. Left TM is normal. External ear canals are normal.  Mouth: Mucous membranes are moist. Normal tongue and tonsil. Posterior oropharynx is clear.  Eyes: Conjunctivae, EOMI and lids are normal. PERRL.  Cardiovascular: Regular rate and rhythm  Pulmonary/Chest: Lungs are clear to auscultation throughout. Effort normal. No respiratory distress. No wheezes, rales or rhonchi.  GI: Abdomen is soft and non-tender throughout. No CVA tenderness bilaterally.  Neurological: Alert and oriented x3. CN 3-12 intact. Strength and sensation are intact and symmetric in the bilateral upper and lower extremities. Gait is stable. Speech is fluent and face is symmetric.  MSK/Skin: Right shoulder with no bony tenderness to palpation, range of motion is normal.  No swelling, erythema, ecchymosis or warmth.  Right buttocks with large approximately 8 cm diameter area of ecchymosis and slight tenderness over the right buttocks. No tenderness over the lateral hip/iliac spine.  Normal range of motion at the hips, knees and ankles.  Compartments are soft throughout the leg.  Psychiatric:The patient has a normal mood and affect.     Labs/Imaging:  Results for orders placed or performed in visit on 03/01/23   XR Shoulder Right G/E 3 Views     Status: None    Narrative    XR SHOULDER RIGHT G/E 3 VIEWS 3/1/2023 12:45 PM     HISTORY: Acute pain of right shoulder    COMPARISON: None.      Impression    IMPRESSION: No fractures are identified. Normal glenohumeral  alignment. The acromioclavicular joint is unremarkable.     JACKY SHARP MD         SYSTEM ID:  [id number]   Results for orders placed or performed in visit on 03/01/23 "   XR Hip Right 2-3 Views     Status: None    Narrative    XR HIP RIGHT 2-3 VIEWS 3/1/2023 12:46 PM     HISTORY: Hip pain, right    COMPARISON: None.      Impression    IMPRESSION: Mild degenerative changes in the right hip. No fractures  are evident.    JACKY SHARP MD         SYSTEM ID:  [id number]   Results for orders placed or performed in visit on 03/01/23   Streptococcus A Rapid Screen w/Reflex to PCR - Clinic Collect     Status: Normal    Specimen: Throat; Swab   Result Value Ref [place]    Group A Strep antigen Negative Negative       COVID-19 PCR: Pending    Natanael Guillen PA-C, March 1, 2023

## 2023-03-01 NOTE — TELEPHONE ENCOUNTER
Writer responded via [employer].    MADDIE RoseN, RN-BC  [facility]

## 2023-03-02 ENCOUNTER — MYC MEDICAL ADVICE (OUTPATIENT)
Dept: FAMILY MEDICINE | Facility: CLINIC | Age: 38
End: 2023-03-02

## 2023-03-02 ENCOUNTER — E-VISIT (OUTPATIENT)
Dept: FAMILY MEDICINE | Facility: CLINIC | Age: 38
End: 2023-03-02

## 2023-03-02 ENCOUNTER — NURSE TRIAGE (OUTPATIENT)
Dept: FAMILY MEDICINE | Facility: CLINIC | Age: 38
End: 2023-03-02

## 2023-03-02 DIAGNOSIS — U07.1 INFECTION DUE TO 2019 NOVEL CORONAVIRUS: Primary | ICD-10-CM

## 2023-03-02 PROCEDURE — 99207 PR NON-BILLABLE SERV PER CHARTING: CPT | Performed by: FAMILY MEDICINE

## 2023-03-02 RX ORDER — UREA 10 %
500 LOTION (ML) TOPICAL 2 TIMES DAILY
COMMUNITY
Start: 2023-03-02 | End: 2024-03-28

## 2023-03-02 RX ORDER — IRON,CARBONYL/ASCORBIC ACID 100-250 MG
TABLET ORAL
COMMUNITY
Start: 2023-03-02

## 2023-03-02 NOTE — TELEPHONE ENCOUNTER
Thanks for sending this  Given many unknown components to this chinese herb that we do not know how it would interact with paxlovid. recommend holding the calm supp for the days you are taking the paxlovid to be safe

## 2023-03-02 NOTE — PATIENT INSTRUCTIONS
Dear Coty,    Based on your responses, you may have COVID-19. This illness can cause fever, cough and trouble breathing. Many people get a mild case and get better on their own. Some people can get very sick.    Will I be tested for COVID-19?  We would like to test you for COVID-19 virus. I have placed orders for this test.     For all employees or close contacts (except [place] and [place] - see below), go to your [employer] home page and scroll down to the section that says  You have an appointment that needs to be scheduled  and click the large green button that says  Schedule Now  and follow the steps to find the next available opening.     If you are unable to complete these steps or if you cannot find any available times, please call [phone] to schedule employee testing.     [place] employees or close contacts, please call [phone].   [place]) employees or close contacts call [phone].    Return to work guidance:  Please let your workplace manager and staffing office know when your isolation ends. Note: if you tested through [facility], there is no need to report to [facility]. If you did not test through [employer], send a copy of your results to [email or web address]. [place] call [phone], [place] call [phone].     Please visit the Employee COVID-19 Testing Information page on the COVID-19 SharePoint site. Here you will find return to work and testing guidance, high and low risk exposure definitions, and frequently asked questions.   [employer] URL: [email or web address]     How can I take care of myself?  Over the counter medications may help with your symptoms such as runny or stuffy nose, cough, chills, or fever.  Talk to your care team about your options.     Some people are at high risk of severe illness (for example, you have a weak immune system, you re 65 years or older, or you have  certain medical problems). If your risk is high and your symptoms started in the last 5 days, we strongly recommend for you to get COVID treatment as soon as possible. Paxlovid and Molnupiravir are proven safe and effective, make you feel better faster, and prevent hospitalization and death.       To schedule an appointment to discuss COVID treatment, request an appointment on [employer] (select  COVID-19 Treatment ) or call [address] [place] [address] ([phone])      Get lots of rest. Drink extra fluids (unless a doctor has told you not to)    Take Tylenol (acetaminophen) or ibuprofen for fever or pain. If you have liver or kidney problems, ask your family doctor if it's okay to take Tylenol o ibuprofen    Take over the counter medications for your symptoms, as directed by your doctor. You may also talk to your pharmacist.      If you have other health problems (like cancer, heart failure, an organ transplant or severe kidney disease): Call your specialty clinic if you don't feel better in the next 2 days.    Know when to call 911. Emergency warning signs include:  o Trouble breathing or shortness of breath  o Pain or pressure in the chest that doesn't go away  o Feeling confused like you haven't felt before, or not being able to wake up  o Bluish-colored lips or face    Where can I get more information?    [facility] - About COVID-19: [email or web address]     CDC - What to Do If You're Sick: www.cdc.gov/coronavirus/2019-ncov/about/steps-when-sick.html    CDC -  Isolation https://www.cdc.gov/coronavirus/2019-ncov/your-health/isolation.html

## 2023-03-02 NOTE — TELEPHONE ENCOUNTER
Provider E-Visit time total (minutes): 2  Coty I can order the cortisol but its not my area of expertise on what to do with the result once we have it. Theoretically it makes sense cortisol will go up with stress but clinically its unclear what to do with the result  in and of it self. Levels of cortisol do not correlate with a certain level of stress. What im trying to say is we dont need a result to know you are under stress.      I can put the order in but I see you recently fell and have a bruise and also got diagnosed with covid so maybe now is not a good time to check this      A good resource might be to look into when your feeling better is to check out the [facility] as they do a lot of integrative and functional medicine and might be better able to help with regard to stress and discuss your cortisol results when you get them . I think the author of the book you are reading works at [facility] or used to in the recent past      Take care and rest and use tylenol for current symptoms. All the best   Dr Taylor

## 2023-03-02 NOTE — TELEPHONE ENCOUNTER
Dr. Taylor asked triage RN to reach out to patient for RN COVID-19 treatment protocol.  Patient submitted eVisit today regarding COVID-19.  Per Dr. Taylor, ALT from 12/12/22 is considered normal.    Per 3/2/23 eVisit:  1. Symptoms began 2/28/23 and include:  Cough  Sore throat  Runny nose/congestion  Headache  Achiness   Feeling very run down  Chills  Pain or pressure in face  Diarrhea      Called patient:  1. No SOB nor chest pain  2. Oxygen monitor at home: 97% this morning and during phone call: 99%  3. Afebrile   4. Had a bad cough this AM  5. Getting body aches, bloating, flatus  6. Temperature during phone call: 96^F   A. Had hot and cold flashes throughout the night    RN COVID TREATMENT VISIT  03/02/23    The patient has been triaged and does not require a higher level of care.    Coty Doyle  38 year old  Current weight? 205 lbs    Has the patient been seen by a primary care provider at an [facility] or [facility] within the past two years? Yes.   Have you been in close proximity to/do you have a known exposure to a person with a confirmed case of influenza? No.     General treatment eligibility:  Date of positive COVID test (PCR or at home)?  03/01/2023 with home test    Are you or have you been hospitalized for this COVID-19 infection? No.   Have you received monoclonal antibodies or antiviral treatment for COVID-19 since this positive test? No.   Do you have any of the following conditions that place you at risk of being very sick from COVID-19?   - Mental health disorders including mood disorders, depression, schizophrenia spectrum disorders   Yes, patient has at least one high risk condition as noted above.     Current COVID symptoms:   - fever or chills  - cough  - fatigue  - muscle or body aches  - headache  - sore throat  - congestion or runny nose  - diarrhea  Yes. Patient has at least one symptom as selected.     How many days since symptoms started? 5 days or  less. Established patient, 12 years or older weighing at least 88.2 lbs, who has symptoms that started in the past 5 days, has not been hospitalized nor received treatment already, and is at risk for being very sick from COVID-19.     Treatment eligibility by RN:    Are you currently pregnant or nursing? No    Do you have a clinically significant hypersensitivity to nirmatrelvir or ritonavir, or toxic epidermal necrolysis (TEN) or Pearson-Luciano Syndrome? No    Do you have a history of hepatitis, any hepatic impairment on the Problem List (such as Child-Hernandez Class C, cirrhosis, fatty liver disease, alcoholic liver disease), or was the last liver lab (hepatic panel, ALT, AST, ALK Phos, bilirubin) elevated in the past 6 months? No    Do you have any history of severe renal impairment (eGFR < 30mL/min)? No    Is patient eligible to continue?   Yes, patient meets all eligibility requirements for the RN COVID treatment (as denoted by all no responses above).     Current Outpatient Medications   Medication Sig Dispense Refill     albuterol (PROAIR HFA/PROVENTIL HFA/VENTOLIN HFA) 108 (90 Base) MCG/ACT inhaler Inhale 2 puffs into the lungs every 6 hours as needed for shortness of breath / dyspnea or wheezing 18 g 1     doxylamine (UNISOM) 25 MG TABS tablet Take 1 tablet (25 mg) by mouth nightly as needed for sleep (Patient not taking: Reported on 12/28/2022) 30 tablet 1     melatonin 3 MG tablet Take 1 tablet (3 mg) by mouth nightly as needed for sleep (Patient not taking: Reported on 12/28/2022) 30 tablet 1     Prenatal MV-Min-Fe Fum-FA-DHA (PRENATAL 1 PO)        UNABLE TO FIND 50 mg MEDICATION NAME: Calm 50 mg     Patient not taking Unisom nor Melatonin.  Medication list updated.    Medications from List 1 of the standing order (on medications that exclude the use of Paxlovid) that patient is taking: NONE. Is patient taking [place]'s Wort? No  Is patient taking Marjorie's Wort or any meds from List 1? No.   Medications  from List 2 of the standing order (on meds that provider needs to adjust) that patient is taking: NONE. Is patient on any of the meds from List 2? No.   Medications from List 3 of standing order (on meds that a RN needs to adjust) that patient is taking: NONE. Is patient on any meds from List 3? No.     Writer yojanad with Dr. Taylor. Patient reported calm supplement has Chinese herbs.  Dr. Taylor recommends patient HOLD Calm.    Paxlovid has an approximate 90% reduction in hospitalization. Paxlovid can possibly cause altered sense of taste, diarrhea (loose, watery stools), high blood pressure, muscle aches.     Would patient like a Paxlovid prescription?   Yes.   Lab Results   Component Value Date    GFRESTIMATED 77 12/12/2022       Was last eGFR reduced? No, eGFR 60 or greater/ No Result on record. Patient can receive the normal renal function dose. Paxlovid Rx sent to [facility] [phone]  [address] [place] [address]    Hours:  Monday - Friday: 8am - 6pm  Saturday - Sunday: 8am - 4pm    [facility] Delivery: Patient to call [phone] to set up  in the cul de sac of the professional building    Temporary change to home medications: Yes-see above    All medication adjustments (holds, etc) were discussed with the patient and patient was asked to repeat back (teachback) their med adjustment.  Did patient understand med adjustment? Yes, patient repeated back and understood correctly.      Reviewed the following instructions with the patient:    Paxlovid (nimatrelvir and ritonavir)    How it works  Two medicines (nirmatrelvir and ritonavir) are taken together. They stop the virus from growing. Less amount of virus is easier for your body to fight.    How to take    Medicine comes in a daily container with both medicine tablets. Take by mouth twice daily (once in the morning, once at night) for 5 days.    The number of tablets to take varies by patient.    Don't chew or  break capsules. Swallow whole.    When to take  Take as soon as possible after positive COVID-19 test result, and within 5 days of your first symptoms.    Possible side effects  Can cause altered sense of taste, diarrhea (loose, watery stools), high blood pressure, muscle aches.    Patient verbalized understanding and in agreement with plan.  Jocelyne Treadwell RN                         Reason for Disposition    [1] Fever > 100.0 F (37.8 C) AND [2] bedridden (e.g., nursing home patient, CVA, chronic illness, recovering from surgery)    Additional Information    Negative: SEVERE difficulty breathing (e.g., struggling for each breath, speaks in single words)    Negative: Difficult to awaken or acting confused (e.g., disoriented, slurred speech)    Negative: Bluish (or gray) lips or face now    Negative: Shock suspected (e.g., cold/pale/clammy skin, too weak to stand, low BP, rapid pulse)    Negative: Sounds like a life-threatening emergency to the triager    Negative: [1] Diagnosed or suspected COVID-19 AND [2] symptoms lasting 3 or more weeks    Negative: [1] COVID-19 exposure AND [2] no symptoms    Negative: COVID-19 vaccine reaction suspected (e.g., fever, headache, muscle aches) occurring 1 to 3 days after getting vaccine    Negative: COVID-19 vaccine, questions about    Negative: [1] Lives with someone known to have influenza (flu test positive) AND [2] flu-like symptoms (e.g., cough, runny nose, sore throat, SOB; with or without fever)    Negative: [1] Adult with possible COVID-19 symptoms AND [2] triager concerned about severity of symptoms or other causes    Negative: COVID-19 and breastfeeding, questions about    Negative: SEVERE or constant chest pain or pressure  (Exception: Mild central chest pain, present only when coughing.)    Negative: MODERATE difficulty breathing (e.g., speaks in phrases, SOB even at rest, pulse 100-120)    Negative: Headache and stiff neck (can't touch chin to chest)    Negative:  Oxygen level (e.g., pulse oximetry) 90 percent or lower    Negative: Chest pain or pressure  (Exception: MILD central chest pain, present only when coughing)    Negative: Patient sounds very sick or weak to the triager    Negative: Fever > 103 F (39.4 C)    Negative: [1] Fever > 101 F (38.3 C) AND [2] over 60 years of age    Negative: MILD difficulty breathing (e.g., minimal/no SOB at rest, SOB with walking, pulse <100)    Protocols used: CORONAVIRUS (COVID-19) DIAGNOSED OR [id number] Treadwell BSN, RN-BC  [facility]

## 2023-03-02 NOTE — TELEPHONE ENCOUNTER
Provider E-Visit time total (minutes): 5  Hi Coty  Sorry you have covid  I will have one of our triage folks call you to assess if you meet criteria for covid med treatment  Continue with symptomatic treatment and stay quarantined first 5 days of illness ( start of symptoms is considered day ) then wear a mask for remaining 5 days when outside or around people without covid   Dr Taylor

## 2023-03-11 ENCOUNTER — MYC MEDICAL ADVICE (OUTPATIENT)
Dept: FAMILY MEDICINE | Facility: CLINIC | Age: 38
End: 2023-03-11
Payer: COMMERCIAL

## 2023-03-15 NOTE — TELEPHONE ENCOUNTER
Writer replied to patient via [employer].  Routed low priority to provider.    MADDIE SeayN, RN  [facility]

## 2023-03-15 NOTE — TELEPHONE ENCOUNTER
I cant see in patient message if she was referring to 5 HTP but that seems to be the case given reply by triage.     To tell the truth I dont know much about 5 HTP & cannot speak either way as to its benefits or safety  Dr Pabon is a respected provider  Theorectically 5 HTP if should increase serotonin in body / brain and maybe therefore function like a mood medication, it should be helpful    You may try it and monitor if its beneficial and if so without side effects then may continue on it under guidance of your nutritionist.    Have you looked into seeing if you can be seen at the [facility] where Dr Pabon himself worked at to check out resources for anxiety if covered by your insurance?    Hope you have recovered from recent fall &  COVID and doing well

## 2023-04-21 ENCOUNTER — MYC MEDICAL ADVICE (OUTPATIENT)
Dept: FAMILY MEDICINE | Facility: CLINIC | Age: 38
End: 2023-04-21

## 2023-04-21 ENCOUNTER — LAB (OUTPATIENT)
Dept: LAB | Facility: CLINIC | Age: 38
End: 2023-04-21
Payer: COMMERCIAL

## 2023-04-21 DIAGNOSIS — R06.83 SNORING: ICD-10-CM

## 2023-04-21 LAB — CORTIS SERPL-MCNC: 12 UG/DL

## 2023-04-21 PROCEDURE — 82533 TOTAL CORTISOL: CPT

## 2023-04-21 PROCEDURE — 36415 COLL VENOUS BLD VENIPUNCTURE: CPT

## 2023-05-30 ASSESSMENT — ASTHMA QUESTIONNAIRES
QUESTION_4 LAST FOUR WEEKS HOW OFTEN HAVE YOU USED YOUR RESCUE INHALER OR NEBULIZER MEDICATION (SUCH AS ALBUTEROL): ONCE A WEEK OR LESS
QUESTION_3 LAST FOUR WEEKS HOW OFTEN DID YOUR ASTHMA SYMPTOMS (WHEEZING, COUGHING, SHORTNESS OF BREATH, CHEST TIGHTNESS OR PAIN) WAKE YOU UP AT NIGHT OR EARLIER THAN USUAL IN THE MORNING: ONCE OR TWICE
QUESTION_1 LAST FOUR WEEKS HOW MUCH OF THE TIME DID YOUR ASTHMA KEEP YOU FROM GETTING AS MUCH DONE AT WORK, SCHOOL OR AT HOME: NONE OF THE TIME
QUESTION_2 LAST FOUR WEEKS HOW OFTEN HAVE YOU HAD SHORTNESS OF BREATH: NOT AT ALL
ACT_TOTALSCORE: 23
ACT_TOTALSCORE: 23
QUESTION_5 LAST FOUR WEEKS HOW WOULD YOU RATE YOUR ASTHMA CONTROL: COMPLETELY CONTROLLED

## 2023-06-05 ENCOUNTER — OFFICE VISIT (OUTPATIENT)
Dept: FAMILY MEDICINE | Facility: CLINIC | Age: 38
End: 2023-06-05
Payer: COMMERCIAL

## 2023-06-05 VITALS
DIASTOLIC BLOOD PRESSURE: 80 MMHG | HEIGHT: 71 IN | OXYGEN SATURATION: 98 % | SYSTOLIC BLOOD PRESSURE: 110 MMHG | HEART RATE: 100 BPM | BODY MASS INDEX: 28.14 KG/M2 | TEMPERATURE: 97.3 F | RESPIRATION RATE: 20 BRPM | WEIGHT: 201 LBS

## 2023-06-05 DIAGNOSIS — R07.89 CHEST TIGHTNESS: ICD-10-CM

## 2023-06-05 DIAGNOSIS — K58.9 IRRITABLE BOWEL SYNDROME, UNSPECIFIED TYPE: ICD-10-CM

## 2023-06-05 DIAGNOSIS — Z82.49 FAMILY HISTORY OF VARICOSE VEINS: ICD-10-CM

## 2023-06-05 DIAGNOSIS — F41.9 ANXIETY: Primary | ICD-10-CM

## 2023-06-05 DIAGNOSIS — J45.20 MILD INTERMITTENT ASTHMA WITHOUT COMPLICATION: ICD-10-CM

## 2023-06-05 DIAGNOSIS — G44.219 EPISODIC TENSION-TYPE HEADACHE, NOT INTRACTABLE: ICD-10-CM

## 2023-06-05 DIAGNOSIS — R79.0 LOW FERRITIN: ICD-10-CM

## 2023-06-05 DIAGNOSIS — M26.609 TMJ (TEMPOROMANDIBULAR JOINT SYNDROME): ICD-10-CM

## 2023-06-05 DIAGNOSIS — G47.9 SLEEP DISORDER: ICD-10-CM

## 2023-06-05 DIAGNOSIS — R00.2 PALPITATIONS: ICD-10-CM

## 2023-06-05 DIAGNOSIS — F43.9 STRESS: ICD-10-CM

## 2023-06-05 DIAGNOSIS — R06.83 SNORING: ICD-10-CM

## 2023-06-05 LAB
ALBUMIN SERPL BCG-MCNC: 4.3 G/DL (ref 3.5–5.2)
ALP SERPL-CCNC: 37 U/L (ref 35–104)
ALT SERPL W P-5'-P-CCNC: 11 U/L (ref 10–35)
ANION GAP SERPL CALCULATED.3IONS-SCNC: 11 MMOL/L (ref 7–15)
AST SERPL W P-5'-P-CCNC: 21 U/L (ref 10–35)
BASOPHILS # BLD AUTO: 0.1 10E3/UL (ref 0–0.2)
BASOPHILS NFR BLD AUTO: 1 %
BILIRUB SERPL-MCNC: 0.3 MG/DL
BUN SERPL-MCNC: 10.1 MG/DL (ref 6–20)
CALCIUM SERPL-MCNC: 9.3 MG/DL (ref 8.6–10)
CHLORIDE SERPL-SCNC: 103 MMOL/L (ref 98–107)
CREAT SERPL-MCNC: 0.88 MG/DL (ref 0.51–0.95)
DEPRECATED HCO3 PLAS-SCNC: 24 MMOL/L (ref 22–29)
EOSINOPHIL # BLD AUTO: 0.3 10E3/UL (ref 0–0.7)
EOSINOPHIL NFR BLD AUTO: 3 %
ERYTHROCYTE [DISTWIDTH] IN BLOOD BY AUTOMATED COUNT: 14.6 % (ref 10–15)
FERRITIN SERPL-MCNC: 41 NG/ML (ref 6–175)
GFR SERPL CREATININE-BSD FRML MDRD: 86 ML/MIN/1.73M2
GLUCOSE SERPL-MCNC: 91 MG/DL (ref 70–99)
HCT VFR BLD AUTO: 41.4 % (ref 35–47)
HGB BLD-MCNC: 13.1 G/DL (ref 11.7–15.7)
IMM GRANULOCYTES # BLD: 0 10E3/UL
IMM GRANULOCYTES NFR BLD: 0 %
LYMPHOCYTES # BLD AUTO: 1.5 10E3/UL (ref 0.8–5.3)
LYMPHOCYTES NFR BLD AUTO: 19 %
MAGNESIUM SERPL-MCNC: 2 MG/DL (ref 1.7–2.3)
MCH RBC QN AUTO: 28.3 PG (ref 26.5–33)
MCHC RBC AUTO-ENTMCNC: 31.6 G/DL (ref 31.5–36.5)
MCV RBC AUTO: 89 FL (ref 78–100)
MONOCYTES # BLD AUTO: 0.6 10E3/UL (ref 0–1.3)
MONOCYTES NFR BLD AUTO: 7 %
NEUTROPHILS # BLD AUTO: 5.5 10E3/UL (ref 1.6–8.3)
NEUTROPHILS NFR BLD AUTO: 70 %
NRBC # BLD AUTO: 0 10E3/UL
NRBC BLD AUTO-RTO: 0 /100
PLATELET # BLD AUTO: 208 10E3/UL (ref 150–450)
POTASSIUM SERPL-SCNC: 4.6 MMOL/L (ref 3.4–5.3)
PROT SERPL-MCNC: 7.1 G/DL (ref 6.4–8.3)
RBC # BLD AUTO: 4.63 10E6/UL (ref 3.8–5.2)
SODIUM SERPL-SCNC: 138 MMOL/L (ref 136–145)
TSH SERPL DL<=0.005 MIU/L-ACNC: 1.42 UIU/ML (ref 0.3–4.2)
WBC # BLD AUTO: 7.9 10E3/UL (ref 4–11)

## 2023-06-05 PROCEDURE — 82728 ASSAY OF FERRITIN: CPT | Performed by: FAMILY MEDICINE

## 2023-06-05 PROCEDURE — 99214 OFFICE O/P EST MOD 30 MIN: CPT | Performed by: FAMILY MEDICINE

## 2023-06-05 PROCEDURE — 36415 COLL VENOUS BLD VENIPUNCTURE: CPT | Performed by: FAMILY MEDICINE

## 2023-06-05 PROCEDURE — 80050 GENERAL HEALTH PANEL: CPT | Performed by: FAMILY MEDICINE

## 2023-06-05 PROCEDURE — 83735 ASSAY OF MAGNESIUM: CPT | Performed by: FAMILY MEDICINE

## 2023-06-05 ASSESSMENT — ANXIETY QUESTIONNAIRES
8. IF YOU CHECKED OFF ANY PROBLEMS, HOW DIFFICULT HAVE THESE MADE IT FOR YOU TO DO YOUR WORK, TAKE CARE OF THINGS AT HOME, OR GET ALONG WITH OTHER PEOPLE?: NOT DIFFICULT AT ALL
GAD7 TOTAL SCORE: 2
3. WORRYING TOO MUCH ABOUT DIFFERENT THINGS: SEVERAL DAYS
2. NOT BEING ABLE TO STOP OR CONTROL WORRYING: NOT AT ALL
IF YOU CHECKED OFF ANY PROBLEMS ON THIS QUESTIONNAIRE, HOW DIFFICULT HAVE THESE PROBLEMS MADE IT FOR YOU TO DO YOUR WORK, TAKE CARE OF THINGS AT HOME, OR GET ALONG WITH OTHER PEOPLE: NOT DIFFICULT AT ALL
7. FEELING AFRAID AS IF SOMETHING AWFUL MIGHT HAPPEN: NOT AT ALL
6. BECOMING EASILY ANNOYED OR IRRITABLE: NOT AT ALL
GAD7 TOTAL SCORE: 2
7. FEELING AFRAID AS IF SOMETHING AWFUL MIGHT HAPPEN: NOT AT ALL
GAD7 TOTAL SCORE: 2
4. TROUBLE RELAXING: NOT AT ALL
5. BEING SO RESTLESS THAT IT IS HARD TO SIT STILL: NOT AT ALL
1. FEELING NERVOUS, ANXIOUS, OR ON EDGE: SEVERAL DAYS

## 2023-06-05 ASSESSMENT — PAIN SCALES - GENERAL: PAINLEVEL: NO PAIN (0)

## 2023-06-05 ASSESSMENT — PATIENT HEALTH QUESTIONNAIRE - PHQ9
10. IF YOU CHECKED OFF ANY PROBLEMS, HOW DIFFICULT HAVE THESE PROBLEMS MADE IT FOR YOU TO DO YOUR WORK, TAKE CARE OF THINGS AT HOME, OR GET ALONG WITH OTHER PEOPLE: NOT DIFFICULT AT ALL
SUM OF ALL RESPONSES TO PHQ QUESTIONS 1-9: 1
SUM OF ALL RESPONSES TO PHQ QUESTIONS 1-9: 1

## 2023-06-05 NOTE — PATIENT INSTRUCTIONS
Labs today   EKG currently not indicated  Suspect symptoms of chest tightness, fluttering more form palpitations due to anxiety rather than heart burn  Currently no symptoms  May try Pepcid 20 mg otc to help with heart burn if any   Dietary and lifestyle changes   Freq smaller meals  Avoid eating 3 hrs before bedtime  See how summer goes  If anxiety get worse can consider meds like sSRI  If palpitations keep recurring can do a zio patch  See sleep as planned  Currently no sign of varicose veins  Suspect cramping was due to excessive walk in while on vacation  If should have leg fatigue and achiness marychuy of day can do ultrasound to check for leaky veins    BMI improved  Continue with eating healthy and staying active    Continue mouthguard  for TMJ   Awaiting TMJ pt specialist   Right shoulder better    IBS managed with dietary modifications  [place] use lactaid if eating dairy     Continue to avoid allergens bets one can  Recovered form COVID  Asthma well controlled on albuterol as needed  Eczema manageable with skin cares  Continue care with derm consultant skin check  Vaginal itching resolved   See you back in dec for a physical and sooner if any concerns

## 2023-06-05 NOTE — PROGRESS NOTES
Assessment & Plan     Anxiety  Suspect symptoms of chest tightness, fluttering more from palpitations due to anxiety rather than heart burn. Currently no symptoms. Labs today. EKG currently not indicated. Will not  anything if no symptoms. If palpitations keep recurring can do a zio patch. May try Pepcid 20 mg OTC to help with heart burn if any Dietary and lifestyle changes  Discussed like Freq smaller meals, Avoid eating 3 hrs before bedtime & see how summer goes & If anxiety get worse can consider meds like selective serotonin reuptake inhibitor. Continue calm, and therapy and naturopath care. Feels gardening will be therapeutic. Stress already better since out in notice at work and will be off for the Summer.  Later labs all came back normal, cbc normal, Liver and gallbladder tests are normal (ALT,AST, Alk phos, bilirubin), kidney function is normal (Cr, GFR), sodium is normal, potassium is normal, calcium is normal, glucose is normal, TSH (thyroid stimulating hormone) level is normal which indicates normal thyroid function& Magnesium is normal.    Continue vitron c daily if possible for low iron and suspected restless legs per sleep. Had a hx of snoring but HST showed no JASON and has made dietary and lifestyle changes that have helped sleep including taking calm, magnesium as needed, cutting down on caffeine and will be starting sleep cbt soon. See sleep as planned    Currently no sign of varicose veins. Suspect the cramping in thighs was due to excessive walking in while on vacation. If should have leg fatigue and achiness end of day can do venous incompetence ultrasound study    BMI improved. Continue with eating healthy and staying active    Tension headaches better with use of mouth guard and magnesium prn. Continue mouthguard  for TMJ & awaiting TMJ pt specialist   Right shoulder better s/p PT and doing exercises given.     IBS managed with dietary modifications. May use lactaid if eating dairy      Continue to avoid allergens best one can. Has recovered from COVID  Asthma is well controlled on albuterol as needed    Eczema manageable with skin cares. Continue care with derm consultant skin check. Vaginal itching resolved   See back in dec for a physical and sooner in an office visit if any concerns  - TSH with free T4 reflex; Future  - TSH with free T4 reflex    Chest tightness  Suspected due to anxiety. None currently. See plan above  - CBC with platelets and differential; Future  - TSH with free T4 reflex; Future  - Ferritin; Future  - Comprehensive metabolic panel (BMP + Alb, Alk Phos, ALT, AST, Total. Bili, TP); Future  - Magnesium; Future  - CBC with platelets and differential  - TSH with free T4 reflex  - Ferritin  - Comprehensive metabolic panel (BMP + Alb, Alk Phos, ALT, AST, Total. Bili, TP)  - Magnesium    Palpitations  Occurred 1 month ago and not since then . Did not have while on vacation. Suspect stress mediated. Will do labs and if recurs can consider a ziopatch  - CBC with platelets and differential; Future  - TSH with free T4 reflex; Future  - Ferritin; Future  - Comprehensive metabolic panel (BMP + Alb, Alk Phos, ALT, AST, Total. Bili, TP); Future  - Magnesium; Future  - CBC with platelets and differential  - TSH with free T4 reflex  - Ferritin  - Comprehensive metabolic panel (BMP + Alb, Alk Phos, ALT, AST, Total. Bili, TP)  - Magnesium    Stress  Improved since gave in her work notice. Continue with self cares. See above.   - TSH with free T4 reflex; Future  - TSH with free T4 reflex    Low ferritin  Continue vitron c daily if possible for low iron and suspected restless legs per sleep. Had a hx of snoring but HST showed no JASON and has made dietary and lifestyle changes that have helped sleep including taking calm, magnesium as needed, cutting down on caffeine and will be starting sleep cbt soon. See sleep as planned  - Ferritin; Future  - Ferritin    Snoring  No JASON on HST   -  Ferritin; Future  - Ferritin    Sleep disorder  Improved with dietary and lifestyle changes including taking calm, magnesium as needed, cutting down on caffeine and will be starting sleep cbt soon. See sleep as planned  - Ferritin; Future  - Ferritin    Family history of varicose veins  Currently no sign of varicose veins. Suspect the cramping in thighs was due to excessive walking in while on vacation. If should have leg fatigue and achiness end of day can do venous incompetence ultrasound study  BMI improved. Continue with eating healthy and staying active    Episodic tension-type headache, not intractable  TMJ (temporomandibular joint syndrome)  Tension headaches better with use of mouth guard and magnesium prn. Continue mouthguard  for TMJ & awaiting TMJ pt specialist   Right shoulder better s/p PT and doing exercises given.     Irritable bowel syndrome, unspecified type  IBS managed with dietary modifications. May use lactaid if eating dairy     Mild intermittent asthma without complication  Continue to avoid allergens best one can. Has recovered from COVID  Asthma is well controlled on albuterol as needed  Eczema manageable with skin cares. Continue care with derm consultant skin check. Vaginal itching resolved   See back in dec for a physical and sooner in an office visit if any concerns    Review of the result(s) of each unique test - dx since dec 2022  Independent interpretation of a test performed by another physician/other qualified health care professional (not separately reported) - sleep  Diagnosis or treatment significantly limited by social determinants of health - anxiety  Ordering of each unique test  Prescription drug management  33 minutes spent by me on the date of the encounter doing chart review, history and exam, documentation and further activities per the note     BMI:   Estimated body mass index is 27.98 kg/m  as calculated from the following:    Height as of this encounter: 1.805 m (5'  "11.06\").    Weight as of this encounter: 91.2 kg (201 lb).   Weight management plan: Discussed healthy diet and exercise guidelines    Regular exercise  See Patient Instructions    Mary Ann Taylor MD  [facility]      Alex Ansari is a 38 year old, presenting for the following health issues:  Consult (Stress/ anxiety heart paputation)        6/5/2023     8:29 AM   Additional Questions   Roomed by Racquel Yang   Accompanied by alone         6/5/2023     8:29 AM   Patient Reported Additional Medications   Patient reports taking the following new medications none     History of Present Illness       Reason for visit:  I think chayo had periodic heart Palpitations when really stressed.  Symptom onset:  More than a month  Symptoms include:  Right before bed when im lying down I get some chest pain or Palpitations  Symptom intensity:  Mild  Symptom progression:  Improving  Had these symptoms before:  No  What makes it worse:  My work stress makes it worse. But i quit my job so i think that will help  What makes it better:  Practicing self care    She eats 2-3 servings of fruits and vegetables daily.She consumes 1 sweetened beverage(s) daily.She exercises with enough effort to increase her heart rate 20 to 29 minutes per day.  She exercises with enough effort to increase her heart rate 4 days per week.   She is taking medications regularly.    Today's PHQ-9         PHQ-9 Total Score: 1    PHQ-9 Q9 Thoughts of better off dead/self-harm past 2 weeks :   Not at all    How difficult have these problems made it for you to do your work, take care of things at home, or get along with other people: Not difficult at all  Today's BALBINA-7 Score: 2     BACKGROUND  38 yr old , with Hx of situational anxiety, given benzo in 6/2021 but never filled, IBS, mild intermittent asthma on albuterol prn, episodic tension headaches, hx of TMJ, prior Mandibular surgery x 2 as an 19 yr old, hx of plantar warts in 2012, " hx of snoring, no JASON on HST, sleep disturbances, on lifestyle and dietary modifications and herbal meds, under care of naturopathy and sleep specialist, to do sleep cbt, episodic tension headaches, right shoulder pain, multiple pigmented nevi, eczema, FH of melanoma, FH of varicose veins, BMI 28, prior contraception, PE tubes, on Mv, B 12, fish oil, zinc, Calm 50 mg supp and herbs by her herbalist/ naturopath, melatonin and Unisom to use as needed once completes this supplement and when pregnant, intolerant to lactose, tolerates a more gluten reduced diet, allergic to sulfa, (noted rash as a young child),   Seen previously by PCP Case Patrick on 7/15/20 for a physical and referred to the allergist for hx of allergies,   Did evisit 9/3/20 for dysuria & given antibiotics, did e visit nov 2029 for Covid symptoms & test ordered no result in epic noted . Did e visit on 12/9/20 for eczema & advised skin cares & OTC Hydrocortisone ointment prn, video visit done 6/14/21 for situational anxiety, had a therapist was doing acupuncture, self cares, yoga , meditation and CBT, seeing a herbalist, declined a daily med, given lorazepam to use prn & referred to a community psychiatrist to consider gene sight testing.   On 12/26/21 did an e visit for dysuria & given nitrofurantoin, [place]  negative.   Seen 12/31/21 for preventive health and additional concerns. Referral given to the  given family history of colon cancer and melanoma. Mammogram not due till age 41. Pelvic / PAP due in 2022. Health care maintenance reviewed. Discussed vaccines, opted to defer flu shot, consider pneumovax in 2022. Reported had Pfizer Covid April 2 week and then the second shot 3 to 4 weeks later and got her booster beginning of December.   Noted situational anxiety manageable with self cares. Had a therapist. Did not feel need for daily medication & had recently set up to see a psychiatrist. Had not started the benzodiazepines given by another  provider previously. Discussed while benzodiazepines very effectively reduce anxiety immediately, they were not recommended for long term use as they could cause more harm than good (memory problems, addiction, and not helping people actually deal with anxiety). They could be addictive, abused, and there were better options for long term management of anxiety, with the most effective combining medication and therapy. Noted was  planning pregnancy & would avoid this medication. Encouraged to discuss more with her new community psychiatrist when saw them as they would be the best person to assess what med was best for her & could do Gene Sight testing if not done by the  and discuss options. Would recommend her community psychiatrist manage any medications for her.   Counseled that there was no actual test for irritable bowel syndrome, it was a diagnosis of exclusion. Given her history and exam and no concerning findings did not suspect anything alarming going on with her bowels. Discussed the brain gut connection and possibility of having irritable bowel. Self cares and avoiding triggers helped. If avoiding dairy and gluten helped her gut, to continue to do so.  Noted asymptomatic mild intermittent asthma. Albuterol refilled and asthma action plan given in my chart.  Clarified did  not have nasal congestion but may be sleeping with mouth open and some snoring related to TMJ though improved with the mouthguard. A humidifier would help. To try saline spray to the nose at bedtime to decongest nose so could sleep with mouth closed and this could help prevent waking up with a dry mouth  Noted was waking up refreshed. [place] then a sleep evaluation was not needed. Was to try to sleep on side if possible or using a wedge pillow given hx of snoring. & some weight loss could be helpful. BMI 28 and discussed briefly diet, & aerobic exercise  For TMJ and prior mandibular surgery was using a mouthguard. To continue care  with her dentist. Tension/pressure headaches likely related to stress and TMJ. Mouthguard helped. To continue with self cares & staying hydrated.   History of dysuria improved. Had done an Evisit few days prior & given nitrofurantoin but had not started as symptoms were improving. Had history of urinary frequency, urgency post menstruation. Recommend when having symptoms to get a urine test done for appropriate antibiotic stewardship, rather than jump to getting an antibiotic to prevent resistance and super gut infections. To void after intercourse, avoid underwear at night, avoid tight clothing, reduce detergent or increase water in wash cycle when cleaning underwear to prevent chemical irritation. Wet prep and UA repeated turned out normal.   Noted normal physiologic midcycle Vaginal discharge    Had family history of melanoma, multiple moles looked benign on exam & Referral given to dermatology for mole mapping skin check.  History of eczema went along with the triad of allergies and asthma. Cold weather and long hot showers could make it worse. Recommended lubricating skin well with a good emollient like  Aquaphor, Sarna, cerave or Eucerin, applying to skin immediately after shower to lock in moisture and may use over-the-counter hydrocortisone ointment to any flare ups sparingly. Could also discuss further with dermatology when saw them.  Was planning to get pregnant. Recommended starting prenatal vitamin with folic acid at least 0.4 mg and avoiding alcohol for at least 3 months prior to pregnancy. If after active trying during peak ovulation time & 6 months go by and still struggling to conceive can refer to OB  Labs came back showing mildly elevated LDL, normal CMP, TSH, Hep c, HIV, UA & UCx neg, UA showed some ketones from fasting. Wet prep and cbc were normal.   Referred for TMJ t PT of her choice. & to gyn for preconception counseling in 2 to 4/2022.   E visit done with Dr Yepez 8/9/22 for insomnia.      Seen 9/23/22 virtually for insomnia, trouble falling asleep, snoring , anxiety etc discussed. Was to continue with sleep hygiene as doing. Options discussed. Ok to continue herbals for now and stop when pregnant or if advised otherwise by gyn in future. Melatonin was safe to take long term & sent in 3 mg at bedtime to use as needed, could increase to 6 mg bedtime if need be. also sent in Unisom 25 mg to use safely now and during pregnancy as needed. Referred to sleep for a sleep specialist and sleep psychologist ( sleep CBT) & was to continue with her individual  therapist for DBT. Maybe the sleep psychologist could suggest she could see for a psychiatrist but to continue with current one till then. Was to continue with self management cares as doing for anxiety as that could affect sleep. Could refer to gyn when pregnant about next steps. Was to return in dec for an in person physical and pap and can get flu and new Covid bivalent omicron vaccine anytime before that. Counseled that since she was menstruating regularly it suggested no hormonal issues.    Seen 12/12/22 for preventive health and additional concerns.Pelvic / PAP HPV obtained & was normal. Noted vaginal itching.  Exam was unremarkable wet prep done just to be safe came back normal. Itching I suspected was related to irritation of the skin in the perirectal labial area. Recommended avoiding harsh soaps.  Decrease detergent amount when washing underclothes.  Avoid underwear at night.  May try Desitin over-the-counter to help the skin heal and decrease itching.  For history of environmental allergies [place] allergy panel done. Had a history of lactose intolerance historically.  Discussed testing would not , testing could be expensive and the simplest option might be just to avoid dairy if was causing symptoms and if would like to take dairy may take Lactaid over-the-counter prior to consuming dairy products.  Deferred a referral to the  allergist. Later testing showed allergy to cat, dust mites and mouse urine  Mild intermittent asthma well-controlled.  Pneumonia vaccine given, asthma action plan in place and albuterol refilled to use as needed.  Anxiety generalized and related to health.  Was to continue with counseling and self cares & the calm supplement.  Sleep disorder /history of snoring.  Was to follow-up with sleep as planned in March.  Unfortunately no earlier appointment available. was to continue care with her naturopath and counselor. Had Unisom and melatonin to use as needed, had not needed to try so far. Had been using the calm supp,   BMI 29. & hx of elevated LDL. No med indicated. Was to continue to stay active eat healthy and lipids came out similar  Tension headaches likely related to TMJ, stress, dehydration etc. was to continue to stay well-hydrated and use the mouthguard which is helping.  TMJ was using a mouthguard and referred to physical  Therapy.  Right shoulder pain improved with physical therapy & to continue with exercises.  Irritable bowel syndrome manageable with dietary modification. Advised there was no good test for lactose intolerance.  There was one test that was expensive but a simple solution would be just to avoid lactose or take Lactaid if eating dairy.  There was no test to confirm irritable bowel syndrome.  Its a diagnosis of exclusion based on clinical symptoms and history.  Offered to check for celiac in the future and or refer to GI if remained concerned.  Eczema manageable with self cares and use of emollients and gloves on hands as needed. Was to continue care with dermatology consultants regarding skin checks for multiple pigmented nevi and family history of melanoma.  The moles seen on back and neck and lower abdomen looked benign & to continue to monitor.     Health care maintenance reviewed. To consider working on health care directives. Given Prevnar 20. Hepatitis B vaccination recommended, felt  had it when in [place] [employer].  Immune testing later confirmed was immune & chart updated.   Recommended just 1 prenatal vitamin over-the-counter which has folic acid and DHA in it rather than one with 4 different meds to swallow as that may be difficult to comply with when pregnant.  Yellow coloration of urine may be normal when on a vitamin. Was to return in 1 year for preventive physical and sooner in an office visit for any new concerns  Labs showed normal cmp, TSH, cbc, hba1c.     Seen by sleep 12/28/22 & HST ordered. This later showed negative for JASON,  On 2/6/23 advised iron supp for low ferritin and enrolled in sleep cbt to start in July. Stated vitron c and magnesium gluconate. UA was negative 2/2023. Treated for COVID with paxlovid 3/2023. Cortisol noted normal 4/21/23    CURRENTLY  Apt made due to sensation of heart flutter.   Has a hx of Anxiety generalized and related to health. Has a counselor. Does self cares & is on the calm supplement. She noted a couple times in April fluttering in heart,  Not sure if was heart burn or something else. Would occur right before going to bed. Work stress had been very high in April & recently put in her notice end of April & hoping that will decrease anxiety this summer. The work stress had not helped her sleep problems & anxiety and so hoping for a lot of rest in June. Just got back from a nice long vacation & had no symptoms on vacation. Was worried as there is high BP in the family  Symptoms occurred when lay down and then stop and start right away. Has been trying not to eat or drink fluids right before bed. Was not happening every night & not since end of April.   Currently has no burning sensation in chest   Had Covid in early march & feels these fluttering's occurred 2 months after ( but would  have been 1 month post COVID if occurred in April)   Had no associated shortness of breath. With anxiety chest feels tight. Had no pain.   Had already decreased  caffeine to 1 cup decaf and no longer with caffeine in the afternoon onwards to help sleep & that has been helpful to sleep. Has always felt a bit dehydrated and will focus on relaxation and hydration on vacation.   Has been using magnesium for muscle tension as needed  Since better will hold off on a ziopatch for now but agreeable to labs.  Hoping to get pregnant this summer.    Hx of low ferritin noted by sleep when evaluated earlier this year. now on vitron c but not every day. HST showed no JASON and is Sleeping better since gave her notice. Spoke to sleep doctor. Told might have restless   Legs and to start sleep cbt this summer. Is under care of her naturopath and counselor. Has Unisom and melatonin to use as needed, has not needed to try so far. Has been using the calm supp, & cut down on caffeine that has also been helpful    While walking in the  on recent vacation was walking more then usual [id number] steps a day and noted had some cramping in right thigh. Not since then. Has Fh of varicose veins    BMI down to 27 , has been eating healthy     Tension headaches likely related to TMJ, stress, dehydration etc and using  the mouthguard retainer helps & better since back to using it post Covid    TMJ using a mouthguard and waiting to see a TMJ pt specialist     Right shoulder pain improved with physical therapy & continues to do the exercises given.    Irritable bowel symptoms come and go depending on what she eats. manageable with dietary modification.    For history of environmental allergies [place] allergy panel showed allergy to cat, dust mites/ ? mold and mouse urine so has been using a filter at home and keeping home clean and working with her nutritionist and managing ok. Currently no need to refer to an allergist.      Mild intermittent asthma well-controlled. Has an asthma action plan in place and albuterol to use as needed. Mostly reactivated by URI's like when had Covid     Eczema manageable  "with self cares and use of emollients and gloves on hands as needed. Worse in the  Winter. Sees dermatology consultants regarding skin checks for multiple pigmented nevi and family history of melanoma.  Vaginal itching resolved    Review of Systems   Constitutional, HEENT, cardiovascular, pulmonary, GI, , musculoskeletal, neuro, skin, endocrine and psych systems are negative, except as otherwise noted.      Objective    /80 (BP Location: Right arm, Patient Position: Sitting, Cuff Size: Adult Regular)   Pulse 100   Temp 97.3  F (36.3  C) (Temporal)   Resp 20   Ht 1.805 m (5' 11.06\")   Wt 91.2 kg (201 lb)   SpO2 98%   BMI 27.98 kg/m    Body mass index is 27.98 kg/m .  Physical Exam   GENERAL: healthy, alert and no distress  EYES: Eyes grossly normal to inspection, PERRL and conjunctivae and sclerae normal, glasses  HENT: ear canals and TM's normal, nose and mouth without ulcers or lesions  NECK: no adenopathy, no asymmetry, masses, or scars and thyroid normal to palpation  RESP: lungs clear to auscultation - no rales, rhonchi or wheezes  BREAST: normal without masses, tenderness or nipple discharge and no palpable axillary masses or adenopathy  CV: regular rate and rhythm, normal S1 S2, no S3 or S4, no murmur, click or rub, no peripheral edema and peripheral pulses strong  ABDOMEN: soft, nontender, no hepatosplenomegaly, no masses and bowel sounds normal  MS: no gross musculoskeletal defects noted, no edema  SKIN: no suspicious lesions or rashes  NEURO: Normal strength and tone, mentation intact and speech normal  PSYCH: mentation appears normal, affect normal/bright, anxious, judgement and insight intact and appearance well groomed    Results for orders placed or performed in visit on 06/05/23   TSH with free T4 reflex     Status: Normal   Result Value Ref Range    TSH 1.42 0.30 - 4.20 uIU/mL   Ferritin     Status: Normal   Result Value Ref Range    Ferritin 41 6 - 175 ng/mL   Comprehensive metabolic " panel (BMP + Alb, Alk Phos, ALT, AST, Total. Bili, TP)     Status: Normal   Result Value Ref Range    Sodium 138 136 - 145 mmol/L    Potassium 4.6 3.4 - 5.3 mmol/L    Chloride 103 98 - 107 mmol/L    Carbon Dioxide (CO2) 24 22 - 29 mmol/L    Anion Gap 11 7 - 15 mmol/L    Urea Nitrogen 10.1 6.0 - 20.0 mg/dL    Creatinine 0.88 0.51 - 0.95 mg/dL    Calcium 9.3 8.6 - 10.0 mg/dL    Glucose 91 70 - 99 mg/dL    Alkaline Phosphatase 37 35 - 104 U/L    AST 21 10 - 35 U/L    ALT 11 10 - 35 U/L    Protein Total 7.1 6.4 - 8.3 g/dL    Albumin 4.3 3.5 - 5.2 g/dL    Bilirubin Total 0.3 <=1.2 mg/dL    GFR Estimate 86 >60 mL/min/1.73m2   Magnesium     Status: Normal   Result Value Ref Range    Magnesium 2.0 1.7 - 2.3 mg/dL   CBC with platelets and differential     Status: None   Result Value Ref Range    WBC Count 7.9 4.0 - 11.0 10e3/uL    RBC Count 4.63 3.80 - 5.20 10e6/uL    Hemoglobin 13.1 11.7 - 15.7 g/dL    Hematocrit 41.4 35.0 - 47.0 %    MCV 89 78 - 100 fL    MCH 28.3 26.5 - 33.0 pg    MCHC 31.6 31.5 - 36.5 g/dL    RDW 14.6 10.0 - 15.0 %    Platelet Count 208 150 - 450 10e3/uL    % Neutrophils 70 %    % Lymphocytes 19 %    % Monocytes 7 %    % Eosinophils 3 %    % Basophils 1 %    % Immature Granulocytes 0 %    NRBCs per 100 WBC 0 <1 /100    Absolute Neutrophils 5.5 1.6 - 8.3 10e3/uL    Absolute Lymphocytes 1.5 0.8 - 5.3 10e3/uL    Absolute Monocytes 0.6 0.0 - 1.3 10e3/uL    Absolute Eosinophils 0.3 0.0 - 0.7 10e3/uL    Absolute Basophils 0.1 0.0 - 0.2 10e3/uL    Absolute Immature Granulocytes 0.0 <=0.4 10e3/uL    Absolute NRBCs 0.0 10e3/uL   CBC with platelets and differential     Status: None    Narrative    The following orders were created for panel order CBC with platelets and differential.  Procedure                               Abnormality         Status                     ---------                               -----------         ------                     CBC with platelets and d...[[id number]]                       Final result                 Please view results for these tests on the individual orders.     No results found for this or any previous visit (from the past 24 hour(s)).

## 2023-06-14 ENCOUNTER — MYC MEDICAL ADVICE (OUTPATIENT)
Dept: FAMILY MEDICINE | Facility: CLINIC | Age: 38
End: 2023-06-14
Payer: COMMERCIAL

## 2023-06-14 NOTE — TELEPHONE ENCOUNTER
See RN response to patient's mychart message re:air qulaity and asthma, current poor air conditions due to smoke from [place]    MADDIE EspinoN RN  [facility]

## 2023-06-15 ASSESSMENT — SLEEP AND FATIGUE QUESTIONNAIRES
HOW LIKELY ARE YOU TO NOD OFF OR FALL ASLEEP WHILE SITTING AND TALKING TO SOMEONE: WOULD NEVER DOZE
HOW LIKELY ARE YOU TO NOD OFF OR FALL ASLEEP WHILE SITTING QUIETLY AFTER LUNCH WITHOUT ALCOHOL: WOULD NEVER DOZE
HOW LIKELY ARE YOU TO NOD OFF OR FALL ASLEEP WHILE WATCHING TV: WOULD NEVER DOZE
HOW LIKELY ARE YOU TO NOD OFF OR FALL ASLEEP WHILE SITTING AND READING: WOULD NEVER DOZE
HOW LIKELY ARE YOU TO NOD OFF OR FALL ASLEEP IN A CAR, WHILE STOPPED FOR A FEW MINUTES IN TRAFFIC: WOULD NEVER DOZE
HOW LIKELY ARE YOU TO NOD OFF OR FALL ASLEEP WHEN YOU ARE A PASSENGER IN A CAR FOR AN HOUR WITHOUT A BREAK: WOULD NEVER DOZE
HOW LIKELY ARE YOU TO NOD OFF OR FALL ASLEEP WHILE LYING DOWN TO REST IN THE AFTERNOON WHEN CIRCUMSTANCES PERMIT: WOULD NEVER DOZE
HOW LIKELY ARE YOU TO NOD OFF OR FALL ASLEEP WHILE SITTING INACTIVE IN A PUBLIC PLACE: WOULD NEVER DOZE

## 2023-06-20 ENCOUNTER — THERAPY VISIT (OUTPATIENT)
Dept: PHYSICAL THERAPY | Facility: CLINIC | Age: 38
End: 2023-06-20
Attending: FAMILY MEDICINE
Payer: COMMERCIAL

## 2023-06-20 DIAGNOSIS — M26.609 TMJ (TEMPOROMANDIBULAR JOINT SYNDROME): ICD-10-CM

## 2023-06-20 PROCEDURE — 97161 PT EVAL LOW COMPLEX 20 MIN: CPT | Mod: GP | Performed by: PHYSICAL THERAPIST

## 2023-06-20 PROCEDURE — 97112 NEUROMUSCULAR REEDUCATION: CPT | Mod: GP | Performed by: PHYSICAL THERAPIST

## 2023-06-20 NOTE — PROGRESS NOTES
PHYSICAL THERAPY EVALUATION  Type of Visit: Evaluation    See electronic medical record for Abuse and Falls Screening details.    Subjective      Presenting condition or subjective complaint: tmj dysfunction,  hx of surgical correction of overbite x 2 years ago,  headaches  Date of onset: 12/14/22 (MD order, issues since 2008)    Relevant medical history: Asthma; Hearing problems; Sleep disorder like apnea   Dates & types of surgery: Jaw surgery x 2 2008,  ear tubes as a child    Prior diagnostic imaging/testing results: MRI not current   Prior therapy history for the same diagnosis, illness or injury: No        Living Environment  Social support: With a significant other or spouse   Type of home: House   Stairs to enter the home: Yes   Is there a railing: Yes   Ramp: No   Stairs inside the home: Yes       Help at home: None  Equipment owned:       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  Hobbies/Interests: reading, gardening, baking, hiking, travel, yoga, duglas chi    Patient goals for therapy: go to dental cleanins without issues    Pain assessment: See objective evaluation for additional pain details     Objective   TMJ/TMD EVALUATION    Pain Level at Rest: 0/10  Pain Level with Use: 5/10  Symptom Location: bilateral popping with opening, lateral jaw pain extending into along her zygomatic arch, headaches during seasonal changes at bilateral temporal area  Pain Quality: Aching and Dull  Symptoms are Exacerbated By: stress, anxiety  Symptoms are Relieved By:   massage, mouth guard, chiropractic care    Parafunctional habits: Bruxism, Clenching  Stressors: work    Headache: was having headaches, but not currently  Exercise routine: walking the dog, yoga, duglas chi  Symptoms reported: Clicking  Dentist: Silverio Phan- gave her the mouth guard      Observation/Facial Symmetry:   TMJ ROM:    mm Joint Noise Deviation Pain   Opening 35-37 no click today, historic open click C-curve,  resolves following isometrics    Left Lateral Deviation 13 none     Right Lateral Deviation 8 none     Protrusion 6 from neutral none     Overbite 6MM  CERVICAL ROM:   (Degrees) Left AROM Right AROM    Cervical Flexion 45    Cervical Extension 60    Cervical Side bend 30 45    Cervical Rotation 60 50    Cervical Protrusion WNL    Cervical Retraction WNL    Thoracic Flexion Not tested    Thoracic Extension Not tested    Thoracic Rotation Not tested Not tested    Pain:   End Feel:       PALPATION: tenderness at masseter, SCM, upper traps  JOINT MOBILITY/ACCESSORY MOTION:   Movement Mobility/Pain   Distraction - (hypomobile) on R,  Slow to close indicating lateral pterygoid hypertonicity   Anterior - (hypomobile)     TMD FINDINGS:  Bite/Occlusion: overbite x 6 mm  Tongue Positioning: pressed to front teeth    Accelerated Tooth Wear: upper molars,decreased with  wear    Assessment & Plan   CLINICAL IMPRESSIONS   Medical Diagnosis: TMJ dysfunction    Treatment Diagnosis: TMJ dysfunction   Impression/Assessment: Patient is a 38 year old female with TMJ and headache complaints.  The following significant findings have been identified: Pain, Decreased ROM/flexibility, Decreased joint mobility, Decreased strength, Decreased proprioception, Impaired muscle performance, Decreased activity tolerance and Impaired posture. These impairments interfere with their ability to perform self care tasks, work tasks, recreational activities, household chores, household mobility and community mobility as compared to previous level of function.     Clinical Decision Making (Complexity):   Clinical Presentation: Stable/Uncomplicated  Clinical Presentation Rationale: based on medical and personal factors listed in PT evaluation  Clinical Decision Making (Complexity): Low complexity    PLAN OF CARE  Treatment Interventions:  Interventions: Manual Therapy, Neuromuscular Re-education, Therapeutic Activity, Therapeutic Exercise,  Self-Care/Home Management    Long Term Goals     PT Goal 1  Goal Identifier: jaw ROM  Goal Description: open to 40-50mm without pain/click/deviation  Rationale:  (improve tolerance to dental cleanings, oral hygiene)  Target Date: 08/15/23      Frequency of Treatment: 1 x per week  Duration of Treatment: 4 weeks tapering to 2 x per month for 1 month    Recommended Referrals to Other Professionals: none  Education Assessment:        Risks and benefits of evaluation/treatment have been explained.   Patient/Family/caregiver agrees with Plan of Care.     Evaluation Time:     PT Eval, Low Complexity Minutes (61639): 25      Signing Clinician: Diane Espino PT

## 2023-06-22 ENCOUNTER — VIRTUAL VISIT (OUTPATIENT)
Dept: SLEEP MEDICINE | Facility: CLINIC | Age: 38
End: 2023-06-22
Attending: INTERNAL MEDICINE
Payer: COMMERCIAL

## 2023-06-22 DIAGNOSIS — F51.04 CHRONIC INSOMNIA: Primary | ICD-10-CM

## 2023-06-22 PROCEDURE — 90791 PSYCH DIAGNOSTIC EVALUATION: CPT | Mod: VID | Performed by: PSYCHOLOGIST

## 2023-06-22 NOTE — PATIENT INSTRUCTIONS
[facility]  Cognitive Behavioral Therapy for Insomnia       Core Sleep Training Module    Now that you understand a bit more about how sleep works and what causes insomnia, you are ready to begin CBT-I Core Sleep Training.  This process involves five key strategies that will:    Strengthen your sleep drive and circadian sleep rhythm  Strengthen the link between your bed and sleep    It will be important that you continue to keep track of your sleep using your Insomnia  Ju or your paper sleep diary.      Core Sleep Strategies    Much like physical activity and healthy eating strengthens our body, studies show that the following Core strategies are key to achieving stronger, healthier sleep. The focus is on quality of sleep (not quantity) and improving how you feel during the day.     Reduce Your Time in Bed    Spending extra time in bed trying to get more sleep can cause more sleep disruption and weaken sleep efficiency. Sleep efficiency is simply the percentage of time a person spends asleep while in bed. Normal sleep efficiency is thought to be 85% or greater.  By reducing your time in bed, you will be awake longer during the day leading to quicker and deeper sleep at night. This strategy does not reduce the amount of sleep you get, just the time you are awake in bed.                                             Your provider has recommended the following initial sleep schedule determined by your  estimate of average sleep time over the past two weeks plus 30 minutes.  It also consider the best time for you to sleep.     Your Sleep Schedule Prescription                       Total Time in Bed:  8 hours                     Bedtime:  No earlier than 10:30 PM                      Wake-up Time:  Out of bed every day by 6:30 AM                     (You can decide on a different wake time as you begin this training but the maximum allowed time in bed should be no more than 8 hours.  If you choose a different  wake time you should stick with it until our follow-up touchbase call.      Don't go to bed until you feel sleepy (not tired or fatigued)     This helps increase your sleep drive by keeping you awake longer.  If you go to bed when you're not sleepy, it gives your brain the wrong message and can lead to frustration.     If you feel sleepy before your prescribed bedtime, find activities that can help you stay awake until it is time for you to go to bed. Even brief naps in the evening can be very disruptive of sleep at night.      Don't stay in bed unless you are sleeping      If you are unable to fall asleep or return to sleep after about 30 minutes, get out of bed. This helps train your brain that the bed is for sleep. It is harmful to your sleep when you are worried or frustrated in bed. Do not read, eat, worry, think about sleep, use mobile phones or tablets, or watch TV in bed. Do not watch the clock during the night.     Go to another room and do something relaxing. Plan things you can do when you get out of bed. Avoid use of mobile devices or computers when out of bed.    Return to bed only when you feel sleepy again.  Repeat as often as needed throughout the night.      Get out of bed at the same time every day    Getting up at the same time helps set your biological clock. It is important to stick to your wake time no matter how much sleep you got the night before or how you feel in the morning.    Varying your wake can have the same effect as jet lag.  It disrupts your biological clock and makes you feel more tired and exhausted.  Trying to  catch up  on sleep on the weekends only makes things worse and sets you up for a cycle of poor sleep the following weekdays.      Make sure you set an alarm and keep it away from the bed to prevent looking at the clock during the night.       Avoid daytime napping    Avoid daytime napping if possible. Napping partially fulfills your need for sleep and weakens your sleep  drive at night.    However, if you find yourself sleepy (not just tired) you can take a brief 15-30-minute nap during the day if needed.  Naps within 7-8 hours of your prescribed wake time are less likely to affect your sleep the coming night.  Sleepy people make more mistakes and may hurt themselves or others. Therefore, safety trumps all other sleep prescriptions.  Never drive or operate equipment if drowsy or sleepy.     Changing Your Sleep Schedule Prescription    At our telephone touch-base call we may adjust your sleep schedule upwards or decrease time in bed depending on how well you are sleeping.    Change is Challenging    Research shows that making significant changes in sleep habits improves sleep quality and how you feel. Improvement takes time and is not always steady. Change is challenging and can be stressful.  This is especially true if old habits - like spending more time in bed -- were a way to avoid worry about getting enough sleep.

## 2023-06-22 NOTE — TELEPHONE ENCOUNTER
Patient reports at followup she notices impact of stress and her period on sleep with increased overall fragmentation of sleep.    She also notices that an earlier sleep window may be better for her.  [facility] 8 hour sleep window to 9 PM with 5 AM wake tup.    She is checking her insurance to make sure visits are covered.    Kody Euceda PsyD, LP, DBSM  Diplomate, Behavioral Sleep Medicine  [facility]

## 2023-06-22 NOTE — PROGRESS NOTES
SLEEP MEDICINE CONSULTATION  Sleep Psychology  2023    Name: Coty Doyle MRN# [id number]   Age: 38 year old YOB: 1985     Date of Consultation: 2023  Consultation is requested by: Jose Deal MD  [address] [address]  Primary care provider: Mary Ann Taylor    Reason for Sleep Consultation     Coty Doyle is a 38 year old female seen today for a behavioral sleep medicine consultation because of insomnia    Assessment and Plan     Sleep Diagnoses:        Chronic Insoimnia           Co-occurring Conditions:      Temporomandibular joint disease    Anxiety, per history    Clinical Impressions:    Coty Doyle was seen for a sleep psychology consultation and possible behavioral sleep intervention and treatment. History and clinical presentation is consistent with chronic psychophysiologic insomnia associated with generalized anxiety.  Previous sleep consultation and diagnostic sleep evaluation do not suggest other intrinsic sleep disorders contributing to insomnia.  This patient is a very suitable candidate for insomnia.    Recommendations and Counselin.  Sleep compression therapy reducing time in bed to 8 hours  2.  Recommended sleep window it is 10:30 PM- 6:30 AM.  3.  Implement relaxing pre-bed routin,    Coty was provided information about the pathophysiology of insomnia, psychophysiological factors contributing to the onset and maintenance of insomnia and how co-occurring medical conditions and intrinsic sleep disorders can affect sleep.  Treatment options were discussed  as applicable to patient specific sleep concerns and symptoms. The benefits and potential early side effects of treatment including increased daytime sleepiness were discussed.     Patient was advised to consult with their prescribing provider around use of or changes to prescription sleep medication.  Patient was counseled on the importance of avoiding  driving if drowsy.    Services provided are compliant with the requirements of [place] Statute SS 256B.0625 Subd. 3b and paragraph (b)     Follow-up: 3 weeks     History of Present Sleep Complaint     Coty Doyle is a 38 year old year old female with a relevant medical history of  Temporomandibular joint disease, anxiety who presents with onset of in in her twenties.  She reports that her partner does not have regular sleep routine and habits.  Getting a new puppy also has exacerbated her sleep issues.    She has been followed by Dr. Jose Deal who completed an HST which was negative for intrinsic sleep disorders.    She reports a history of anxiety that emerged in early adulthood. She is in psychotherapy and does Yoga, meditation and completed a DBT self directed course.  She has not not tried pharmacotherapy.  She reports there are certain  Triggers for her anxiety that may be environmental and social may cause hyperarousal and affect sleep.    Coty reports that her  travels which results in their dog barking more in the evening.  This impacts her ability to fall asleep.  It may also affect middle of the night awakening.      Recently she has been working on implementing sleep hygiene and stimulus control strategies.    She has been using various applications to help with sleep.    Patient does report a moderate sleep specific worry, particularly around concerns that she get adequate sleep as she attempts to become pregnant.  She has already taken steps to prepare for pregnancy including discontinuing use of caffeine and alcohol.    Activities in bed: Read    Prescribed or OTC stimulants: Not applicable    Prescribed or OTC sleep medication: Calm herbal supplement    Previous sleep medications patients has tried: Melatonin    SLEEP-WAKE SCHEDULE:    Work/School Days: Patient goes to school/work: No     Time to Bed:  8-9 pm cst    Sleep Latency: 10-20 min to fall asleep    Difficulty falling  asleep:  A couple times each week nights per week    Number of awakenings: 1-5 times each night times per night due to External stimuli (bed partner, pets, noise, etc), Use the bathroom, Anxiety, Nightmares    Trouble falling back asleep 3 times eaxh week  times a week     Usually takes 10-20 minutes to get back to sleep              Rise time: 6-7 am cst    Uses alarm? Yes    Weekends/Non-work Days/All Other Days    Usually gets into bed at 8-9 pm cst     Sleep latency:  10-20 min     Rise time: 6-7 am cst    Uses alarm? Yes    Patient sleep estimates:    Average total sleep time:  8-9 hours     Patient estimate of sleep need: 8-9 hours    Preferred sleep position(s): Back     Naps    Intentional naps: Never times a week    Duration:  Less than an hour in duration    Feels better after a nap: Yes    Unintentional Dozing:  No days per week    Driving accident or near-miss due to sleepiness/drowsiness? No    SLEEP DISRUPTIONS:    Breathing/Snoring    Patient snores:Yes    Other people complain about Her snoring: No    Patient has been told She stops breathing in Her sleep: Yes    She has issues with any of the following: Morning mouth dryness, Stuffy nose when you wake up, Heartburn or reflux at night, Getting up to urinate more than once    Movement:    Patient gets pain, discomfort, with an urge to move: No    Symptoms occur when She is resting: No    Symptoms occur more at night:No    Patient has been told She kicks Her legs at night:Yes     Behaviors in Sleep:    Coty JESUS Doyle has experienced the following behaviors while sleeping: Recurring Nightmares, Teeth grinding    She has experienced sudden muscle weakness during the day: No    Caffeine, Alcohol and Other Substances:    Number of caffeinated beverages(per day: Less than 2    Last caffeine use is usually: 12 pm cst    Uses alcohol to promote sleep: No    Drinks alcohol near bedtime: No      FAMILY HISTORY OF SLEEP DISORDERS    Patient has a family  member been diagnosed with a sleep disorder: No            SCALES      EPWORTH SLEEPINESS SCALE    Likeliness of dozing or falling  asleep:    Sitting and reading: Would never doze    Watching TV: Would never doze    Sitting, inactive in a public place (e.g. a theatre or a meeting): Would never doze    As a passenger in a car for an hour without a break: Would never doze    Lying down to rest in the afternoon when circumstances permit: Would never doze    Sitting and talking to someone: Would never doze    Sitting quietly after a lunch without alcohol: Would never doze    In a car, while stopped for a few minutes in traffic: Would never doze    Total score - [place]: 0      INSOMNIA SEVERITY INDEX (SELENA)      The Insomnia Severity Index measures the severity of insomnia symptoms over the past two weeks.    1. Difficulty falling asleep: Mild    2. Difficulty staying asleep: Moderate    3. Problems waking up too early: Moderate    4. How SATISFIED/DISSATISFIED are youwith your CURRENT sleep pattern? Dissatisfied    5. How NOTICEABLE to others do you think your sleep problem is in terms of impairing the quality of your life? Somewhat      6. How WORRIED/DISTRESSED are you about your sleep problem? Much    7. To what extent do you consider your sleep problem to INTERFERE with your daily functioning (e.g. daytime fatigue, mood, ability to functon at work/daily chores, concentration, memory, mood, etc.) CURRENTLY?   Somewhat     SELENA Total Score: 15    Guidelines for Scoring/Interpretation:   0-7 = No clinically significant insomnia   8-14 = Subthreshold insomnia   15-21 = Clinical insomnia (moderate severity)  22-28 = Clinical insomnia (severe)      STOP BANG     1. Snoring: Do you snore loudly (louder than talking or loud enough to be heard through closed doors)?  No    2. Tired: Do you often feel tired, fatigued, or sleepy during daytime?  No    3. Observed: Has anyone observed you stop breathing during your  "sleep?  Yes    4. Blood pressure: Do you have or are you being treated for high blood pressure?  No    5. BMI: BMI more than 35 kg/m2?  No    6. Age: Age over 50 yr old?  No    7. Neck circumference: Neck circumference greater than 40 cm?  No    8. Gender: Gender male?  No    STOP-BANG Total Score: 1    JASON Risk  0-2 Low risk for JASON  3-4  Intermediate risk for JASON  5-8 High risk        Previous Sleep Studies     Study Date: 1/25/2023  PCP/Referring Provider: Mary Ann Taylor;   Ordering Provider: Jose Deal MD           Indications for Home Study: Coty Doyle is a 38 year old female with a history of insomnia with low ferritin level and mood disturbance who presents with symptoms suggestive of obstructive sleep apnea.     Estimated body mass index is 29.14 kg/m  as calculated from the following:    Height as of 12/28/22: 1.791 m (5' 10.5\").    Weight as of 12/28/22: 93.4 kg (206 lb).  Total score - [place]: 0 (1/24/2023  6:57 PM)  STOP-BANG: 3/8           Data: A full night home sleep study was performed recording the standard physiologic parameters including body position, movement, sound, nasal pressure, thermal oral airflow, chest and abdominal movements with respiratory inductance plethysmography, and oxygen saturation by pulse oximetry. Pulse rate was estimated by oximetry recording. This study was considered adequate based on > 4 hours of quality oximetry and respiratory recording. As specified by the AASM Manual for the Scoring of Sleep and Associated events, version 2.3, Rule VIII.D 1B, 4% oxygen desaturation scoring for hypopneas is used as a standard of care on all home sleep apnea testing.           Analysis Time: 488 minutes           Respiration:   Sleep Associated Hypoxemia: sustained hypoxemia was not present. Baseline oxygen saturation was 96%.  Time with saturation less than or equal to 88% was 0 minutes. The lowest oxygen saturation was 88%.   Snoring: Snoring was absent.  Respiratory " events: The home study revealed a presence of 0 obstructive apneas and 7 mixed and central apneas. There were 2 hypopneas resulting in a combined apnea/hypopnea index [AHI] of 1 events per hour.  AHI was 1 per hour supine, 0 per hour prone, 4 per hour on left side, and 0 per hour on right side.   Pattern: Excluding events noted above, respiratory rate and pattern was Normal.        Position: Percent of time spent: supine -68%, prone -0%, on left -21%, on right -11%.        Heart Rate: By pulse oximetry normal rate was noted.         Assessment:     No significant obstructive sleep apnea.    Sleep associated hypoxemia was not present.    Vitals     There were no vitals taken for this visit.     Medical History     Allergies:    Allergies   Allergen Reactions     Animal Dander Unknown     Dust Mites Unknown     Lactose GI Disturbance and Itching     Other Environmental Allergy      Cat, mold/ dust and mouse urine,      Sulfa Antibiotics Rash     As a child       Medications:    Current Outpatient Medications   Medication Sig Dispense Refill     albuterol (PROAIR HFA/PROVENTIL HFA/VENTOLIN HFA) 108 (90 Base) MCG/ACT inhaler Inhale 2 puffs into the lungs every 6 hours as needed for shortness of breath / dyspnea or wheezing (Patient not taking: Reported on 6/5/2023) 18 g 1     Iron-Vitamin C (IRON 100/C) 100-250 MG TABS        magnesium gluconate (MAGONATE) 500 (27 Mg) MG tablet Take 1 tablet (500 mg) by mouth 2 times daily       Prenatal MV-Min-Fe Fum-FA-DHA (PRENATAL 1 PO)        UNABLE TO FIND 50 mg MEDICATION NAME: Calm 50 mg         Problem List:  Patient Active Problem List    Diagnosis Date Noted     Sleep disorder 09/23/2022     Priority: Medium     Shoulder pain, right 09/09/2022     Priority: Medium     Episodic tension-type headache, not intractable 04/06/2022     Priority: Medium     Irritable bowel syndrome, unspecified type 04/06/2022     Priority: Medium     TMJ (temporomandibular joint syndrome)  01/01/2022     Priority: Medium     BMI 28.0-28.9,adult 01/01/2022     Priority: Medium     Snoring 01/01/2022     Priority: Medium     Anxiety 12/31/2021     Priority: Medium     Family history of melanoma 12/31/2021     Priority: Medium     Multiple pigmented nevi 12/31/2021     Priority: Medium     Eczema, unspecified type 12/31/2021     Priority: Medium     Mild intermittent asthma without complication      Priority: Medium        Past Medical/Surgical History:  Past Medical History:   Diagnosis Date     CARDIOVASCULAR SCREENING; LDL GOAL LESS THAN 160 04/17/2012     Contraceptive management 04/18/2012     History of COVID-19      Jaw pain 12/31/2021     Nonintractable episodic headache, unspecified headache type 01/01/2022     Plantar warts 07/06/2012     Uncomplicated asthma      Patient Active Problem List    Diagnosis Date Noted     Sleep disorder 09/23/2022     Priority: Medium     Shoulder pain, right 09/09/2022     Priority: Medium     Episodic tension-type headache, not intractable 04/06/2022     Priority: Medium     Irritable bowel syndrome, unspecified type 04/06/2022     Priority: Medium     TMJ (temporomandibular joint syndrome) 01/01/2022     Priority: Medium     BMI 28.0-28.9,adult 01/01/2022     Priority: Medium     Snoring 01/01/2022     Priority: Medium     Anxiety 12/31/2021     Priority: Medium     Family history of melanoma 12/31/2021     Priority: Medium     Multiple pigmented nevi 12/31/2021     Priority: Medium     Eczema, unspecified type 12/31/2021     Priority: Medium     Mild intermittent asthma without complication      Priority: Medium     Patient Active Problem List   Diagnosis     Anxiety     Mild intermittent asthma without complication     Family history of melanoma     Multiple pigmented nevi     Eczema, unspecified type     TMJ (temporomandibular joint syndrome)     BMI 28.0-28.9,adult     Snoring     Episodic tension-type headache, not intractable     Irritable bowel syndrome,  unspecified type     Shoulder pain, right     Sleep disorder        Mental Health History     Prior Mental Health Diagnosis:   Anxiety Disorder Unspecified    Mental Health Treatment:   Psychotherapy    Chemical Abuse/Treatment:    None reported      Family History     Family History   Problem Relation Age of Onset     Osteoporosis Mother      Varicose Veins Mother      Hypertension Father      Allergies Father      Eye Disorder Father         Glasses     Anxiety Disorder Sister      Anxiety Disorder Sister      Thyroid Disease Maternal Grandmother      Heart Disease Maternal Grandfather         Pace Maker     Diabetes Maternal Grandfather         Type 2 late in life     Prostate Cancer Maternal Grandfather         Prostate     Eye Disorder Maternal Grandfather         Glasses     Hypertension Paternal Grandfather         Recently passed in December 2021     Colon Cancer Other         Mothers grandmother and great grandmother     Mental Illness Other         Bipolar     Osteoporosis Other         Aunt     Thyroid Disease Other         Maternal aunt     Obesity Other         Maternal cousin       Social History     Social History     Socioeconomic History     Marital status:      Number of children: 0   Tobacco Use     Smoking status: Never     Smokeless tobacco: Never     Tobacco comments:     Never smoked   Vaping Use     Vaping Use: Never used   Substance and Sexual Activity     Alcohol use: Not Currently     Comment: rarely     Drug use: No     Sexual activity: Yes     Partners: Male     Birth control/protection: Condom   Other Topics Concern     Parent/sibling w/ CABG, MI or angioplasty before 65F 55M? No   Social History Narrative    Jun 2026: father in laws surgery went well in dec, just returned from [place] vacation, gave in notice and stress decreased since stopped work        Dec 2022: lives with  and dog.  to leave dec 21st to dad in [place] as father in law has some kidn of intestinal  cancer         Dec 2021: lives with  who is of Hebrew origin, can speak and read some Bulgarian. 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  The patient was cooperative throughout the evaluation with no signs of hallucinations, delusions, loosening of associations or other thought disturbance.  Mood was normal Affect was congruent with mood. Insight and judgement were intact.  Memory appeared intact for recent and remote elements.  There was no report of suicidal ideation, intention or plan. Attention and concentration were within normal.        Kody Euceda, Kevin, LP, DBSM  Diplomate, Behavioral Sleep Medicine  [facility]      Copy:   Mary Ann Taylor MD  [address]  [place] [address]    Note: This dictation was created using voice recognition software. This document may contain an error not identified before finalizing the document. If the error changes the accuracy of the document, I would appreciate it being brought to my attention.

## 2023-07-19 ENCOUNTER — TELEPHONE (OUTPATIENT)
Dept: SLEEP MEDICINE | Facility: CLINIC | Age: 38
End: 2023-07-19
Payer: COMMERCIAL

## 2023-08-19 ENCOUNTER — MYC MEDICAL ADVICE (OUTPATIENT)
Dept: FAMILY MEDICINE | Facility: CLINIC | Age: 38
End: 2023-08-19
Payer: COMMERCIAL

## 2023-08-21 NOTE — TELEPHONE ENCOUNTER
Yes safe to take tylenol as needed  If taking more than 9 / month ca results in rebound headaches too  Try magnesium up to 400 mg ( can cause diarrhea)  to prevent headaches along with vitamin B 6 ( 400 mg) otc daily to prevent headaches if getting frequent

## 2023-08-21 NOTE — TELEPHONE ENCOUNTER
Writer responded via [employer].  MADDIE RoseN, RN-BC  [facility]

## 2023-08-30 ENCOUNTER — E-VISIT (OUTPATIENT)
Dept: FAMILY MEDICINE | Facility: CLINIC | Age: 38
End: 2023-08-30
Payer: COMMERCIAL

## 2023-08-30 DIAGNOSIS — R19.7 DIARRHEA, UNSPECIFIED TYPE: Primary | ICD-10-CM

## 2023-08-30 PROCEDURE — 99421 OL DIG E/M SVC 5-10 MIN: CPT | Performed by: FAMILY MEDICINE

## 2023-08-31 NOTE — TELEPHONE ENCOUNTER
Provider E-Visit time total (minutes): 10  Hi Coty  Most common cause of loose stool is viral  Please check for COVID  Usually giardiasis in the past does not mean you have it currently  Its a self resolving infection   Recommend if COVID negative ad symptoms persist to do a stool test  I will leave the order in the computer for you to schedule to drop off a sample. Please make a lab only apt and call lab on how to give them a sample  In the mean time its very important to stay well hydrated   If you should have fever,  vomiting, difficulty keeping up with hydration or blood in your stools or excruciating pain please go to urgent care.  Most times infectious diarrhea is self resolving and gets better with time  Avoid dairy until gut feels better   All the best Dr Taylor

## 2023-08-31 NOTE — PATIENT INSTRUCTIONS
Thank you for choosing us for your care. Given your symptoms, I would like you to do a lab-only visit to determine what is causing them.  I have placed the orders.  Please schedule an appointment with the lab right here [place], or call [phone].  I will let you know when the results are back and next steps to take.

## 2023-09-01 ENCOUNTER — LAB (OUTPATIENT)
Dept: LAB | Facility: CLINIC | Age: 38
End: 2023-09-01
Payer: COMMERCIAL

## 2023-09-01 DIAGNOSIS — R19.7 DIARRHEA, UNSPECIFIED TYPE: ICD-10-CM

## 2023-09-01 LAB
ADV 40+41 DNA STL QL NAA+NON-PROBE: NEGATIVE
ASTRO TYP 1-8 RNA STL QL NAA+NON-PROBE: NEGATIVE
C CAYETANENSIS DNA STL QL NAA+NON-PROBE: NEGATIVE
CAMPYLOBACTER DNA SPEC NAA+PROBE: NEGATIVE
CRYPTOSP DNA STL QL NAA+NON-PROBE: NEGATIVE
E COLI O157 DNA STL QL NAA+NON-PROBE: NORMAL
E HISTOLYT DNA STL QL NAA+NON-PROBE: NEGATIVE
EAEC ASTA GENE ISLT QL NAA+PROBE: NEGATIVE
EC STX1+STX2 GENES STL QL NAA+NON-PROBE: NEGATIVE
EPEC EAE GENE STL QL NAA+NON-PROBE: NEGATIVE
ETEC LTA+ST1A+ST1B TOX ST NAA+NON-PROBE: NEGATIVE
G LAMBLIA DNA STL QL NAA+NON-PROBE: NEGATIVE
NOROVIRUS GI+II RNA STL QL NAA+NON-PROBE: NEGATIVE
P SHIGELLOIDES DNA STL QL NAA+NON-PROBE: NEGATIVE
RVA RNA STL QL NAA+NON-PROBE: NEGATIVE
SALMONELLA SP RPOD STL QL NAA+PROBE: NEGATIVE
SAPO I+II+IV+V RNA STL QL NAA+NON-PROBE: NEGATIVE
SHIGELLA SP+EIEC IPAH ST NAA+NON-PROBE: NEGATIVE
V CHOLERAE DNA SPEC QL NAA+PROBE: NEGATIVE
VIBRIO DNA SPEC NAA+PROBE: NEGATIVE
Y ENTEROCOL DNA STL QL NAA+PROBE: NEGATIVE

## 2023-09-01 PROCEDURE — 87507 IADNA-DNA/RNA PROBE TQ 12-25: CPT

## 2023-09-01 NOTE — TELEPHONE ENCOUNTER
Provider E-Visit time total (minutes): 1  Ok good to know COVID was negative  Stress could cause symptoms too  You can do a home pregnancy test  Right now dont recommend anything over the counter except maybe metamucil may help bulk up stool  Lets see what tests show  All the best   Dr Taylor

## 2023-10-03 ENCOUNTER — MYC MEDICAL ADVICE (OUTPATIENT)
Dept: FAMILY MEDICINE | Facility: CLINIC | Age: 38
End: 2023-10-03
Payer: COMMERCIAL

## 2023-10-04 NOTE — CONFIDENTIAL NOTE
Writer called and left message on patient's voicemail to call back and speak with a triage nurse.    Also, Writer responded via [employer].    MADDIE MartinezN RN  [facility]

## 2023-10-18 ENCOUNTER — MYC MEDICAL ADVICE (OUTPATIENT)
Dept: FAMILY MEDICINE | Facility: CLINIC | Age: 38
End: 2023-10-18
Payer: COMMERCIAL

## 2023-10-18 DIAGNOSIS — F41.9 ANXIETY: Primary | ICD-10-CM

## 2023-11-13 ENCOUNTER — PATIENT OUTREACH (OUTPATIENT)
Dept: CARE COORDINATION | Facility: CLINIC | Age: 38
End: 2023-11-13
Payer: COMMERCIAL

## 2023-11-26 ENCOUNTER — MYC MEDICAL ADVICE (OUTPATIENT)
Dept: FAMILY MEDICINE | Facility: CLINIC | Age: 38
End: 2023-11-26
Payer: COMMERCIAL

## 2023-11-26 DIAGNOSIS — Z13.88 SCREENING FOR LEAD EXPOSURE: Primary | ICD-10-CM

## 2023-11-27 ENCOUNTER — PATIENT OUTREACH (OUTPATIENT)
Dept: CARE COORDINATION | Facility: CLINIC | Age: 38
End: 2023-11-27
Payer: COMMERCIAL

## 2023-11-27 NOTE — TELEPHONE ENCOUNTER
Patient requests screening test for lead exposure. [facility] is warning folks that they may have been exposed through lead pipes.    Order pended for your review.    Jennifer Morales RN, BSN, PHN  [facility]  [phone]      Patient's MyChart message:    I recently got a letter from [place] [facility] about possible lead exposure from drinking water in [place]. I was curious if you would order a lead blood test for me at the clinic so I can confirm if I ve been exposed. I have some symptoms but we haven t been able to get a water test from the city yet. I thought this might be faster. My next physical is scheduled with my new in network provider in December and not sure if I want to wait that long to get tested.  Warm regards,  Coty

## 2023-11-28 NOTE — TELEPHONE ENCOUNTER
Start with a blood capillary test & only if its abnormal then proceed to confirmatory venous testing

## 2023-12-12 ENCOUNTER — OFFICE VISIT (OUTPATIENT)
Dept: OBGYN | Facility: CLINIC | Age: 38
End: 2023-12-12
Payer: COMMERCIAL

## 2023-12-12 VITALS
HEART RATE: 96 BPM | DIASTOLIC BLOOD PRESSURE: 80 MMHG | SYSTOLIC BLOOD PRESSURE: 114 MMHG | OXYGEN SATURATION: 98 % | WEIGHT: 214 LBS | HEIGHT: 71 IN | BODY MASS INDEX: 29.96 KG/M2

## 2023-12-12 DIAGNOSIS — Z13.220 SCREENING FOR LIPID DISORDERS: ICD-10-CM

## 2023-12-12 DIAGNOSIS — F41.9 ANXIETY: ICD-10-CM

## 2023-12-12 DIAGNOSIS — Z13.88 SCREENING FOR LEAD EXPOSURE: ICD-10-CM

## 2023-12-12 DIAGNOSIS — Z01.419 ENCOUNTER FOR GYNECOLOGICAL EXAMINATION WITHOUT ABNORMAL FINDING: Primary | ICD-10-CM

## 2023-12-12 DIAGNOSIS — L90.0 LICHEN SCLEROSUS: ICD-10-CM

## 2023-12-12 DIAGNOSIS — Z23 NEED FOR PROPHYLACTIC VACCINATION AND INOCULATION AGAINST INFLUENZA: ICD-10-CM

## 2023-12-12 DIAGNOSIS — N92.6 IRREGULAR MENSES: ICD-10-CM

## 2023-12-12 LAB
CHOLEST SERPL-MCNC: 249 MG/DL
FASTING STATUS PATIENT QL REPORTED: YES
HDLC SERPL-MCNC: 47 MG/DL
LDLC SERPL CALC-MCNC: 180 MG/DL
NONHDLC SERPL-MCNC: 202 MG/DL
TRIGL SERPL-MCNC: 111 MG/DL

## 2023-12-12 PROCEDURE — 83520 IMMUNOASSAY QUANT NOS NONAB: CPT | Performed by: OBSTETRICS & GYNECOLOGY

## 2023-12-12 PROCEDURE — 99000 SPECIMEN HANDLING OFFICE-LAB: CPT | Performed by: OBSTETRICS & GYNECOLOGY

## 2023-12-12 PROCEDURE — 80061 LIPID PANEL: CPT | Performed by: OBSTETRICS & GYNECOLOGY

## 2023-12-12 PROCEDURE — 83655 ASSAY OF LEAD: CPT | Mod: 90 | Performed by: OBSTETRICS & GYNECOLOGY

## 2023-12-12 PROCEDURE — 99385 PREV VISIT NEW AGE 18-39: CPT | Mod: 25 | Performed by: OBSTETRICS & GYNECOLOGY

## 2023-12-12 PROCEDURE — 90686 IIV4 VACC NO PRSV 0.5 ML IM: CPT | Performed by: OBSTETRICS & GYNECOLOGY

## 2023-12-12 PROCEDURE — 90471 IMMUNIZATION ADMIN: CPT | Performed by: OBSTETRICS & GYNECOLOGY

## 2023-12-12 PROCEDURE — 96127 BRIEF EMOTIONAL/BEHAV ASSMT: CPT | Performed by: OBSTETRICS & GYNECOLOGY

## 2023-12-12 PROCEDURE — 84146 ASSAY OF PROLACTIN: CPT | Performed by: OBSTETRICS & GYNECOLOGY

## 2023-12-12 PROCEDURE — 99214 OFFICE O/P EST MOD 30 MIN: CPT | Mod: 25 | Performed by: OBSTETRICS & GYNECOLOGY

## 2023-12-12 PROCEDURE — 36415 COLL VENOUS BLD VENIPUNCTURE: CPT | Performed by: OBSTETRICS & GYNECOLOGY

## 2023-12-12 RX ORDER — HYDROXYZINE PAMOATE 25 MG/1
25 CAPSULE ORAL 3 TIMES DAILY PRN
Qty: 90 CAPSULE | Refills: 4 | Status: SHIPPED | OUTPATIENT
Start: 2023-12-12 | End: 2024-02-02

## 2023-12-12 RX ORDER — CLOBETASOL PROPIONATE 0.5 MG/G
OINTMENT TOPICAL 2 TIMES DAILY
Qty: 30 G | Refills: 4 | Status: SHIPPED | OUTPATIENT
Start: 2023-12-12 | End: 2024-03-28

## 2023-12-12 ASSESSMENT — ANXIETY QUESTIONNAIRES
GAD7 TOTAL SCORE: 8
7. FEELING AFRAID AS IF SOMETHING AWFUL MIGHT HAPPEN: NOT AT ALL
IF YOU CHECKED OFF ANY PROBLEMS ON THIS QUESTIONNAIRE, HOW DIFFICULT HAVE THESE PROBLEMS MADE IT FOR YOU TO DO YOUR WORK, TAKE CARE OF THINGS AT HOME, OR GET ALONG WITH OTHER PEOPLE: SOMEWHAT DIFFICULT
3. WORRYING TOO MUCH ABOUT DIFFERENT THINGS: MORE THAN HALF THE DAYS
GAD7 TOTAL SCORE: 8
6. BECOMING EASILY ANNOYED OR IRRITABLE: SEVERAL DAYS
1. FEELING NERVOUS, ANXIOUS, OR ON EDGE: SEVERAL DAYS
5. BEING SO RESTLESS THAT IT IS HARD TO SIT STILL: SEVERAL DAYS
2. NOT BEING ABLE TO STOP OR CONTROL WORRYING: MORE THAN HALF THE DAYS

## 2023-12-12 ASSESSMENT — ENCOUNTER SYMPTOMS
MYALGIAS: 1
SHORTNESS OF BREATH: 0
HEARTBURN: 1
COUGH: 0
PARESTHESIAS: 0
FREQUENCY: 1
NERVOUS/ANXIOUS: 1
HEADACHES: 1
BREAST MASS: 0
CHILLS: 0
DIARRHEA: 0
JOINT SWELLING: 0
ABDOMINAL PAIN: 0
HEMATOCHEZIA: 0
EYE PAIN: 0
CONSTIPATION: 0
DYSURIA: 0
SORE THROAT: 0
WEAKNESS: 0
NAUSEA: 0
DIZZINESS: 0
ARTHRALGIAS: 1
PALPITATIONS: 1
FEVER: 0
HEMATURIA: 0

## 2023-12-12 ASSESSMENT — PATIENT HEALTH QUESTIONNAIRE - PHQ9
5. POOR APPETITE OR OVEREATING: SEVERAL DAYS
SUM OF ALL RESPONSES TO PHQ QUESTIONS 1-9: 5

## 2023-12-12 NOTE — PATIENT INSTRUCTIONS
Evisit in 6 weeks to recheck anxiety and lichen     If not pregnant by April, then evisit about pregnancy    Lifestyle recommendations when attempting pregnancy    Limit caffeine less than 300 mg/day  Alcohol less than 2 drinks per day  Exercise is fine, regular and moderate  Take multivitamin or prenatal vitamin daily  (Folic Acid 400 mcg per day)    Cycle instructions:    The first day of bleeding/period is called Day 1.    Start testing for ovulation using the store bought ovulation predictor kits on Day 11 of cycle.  When you get a positive surge, you will most likely be ovulating within the next 24 hours.       Test the urine about the same time each day.         The test is positive when you see 2 dark solid lines or smiley face.  (one faint line and one dark line is NOT positive)       Once the test is positive, you can stop testing.  Have sex/intercourse the day the test is positive.  The next day, have sex again.    If you don't have a period by 15-16 days after LH kit is positive (surge) then do urine pregnancy test and call clinic if positive.      Basic info:  The ovulation predictor kits are found in the condom/tampon section of the store.  Clear blue easy brand is simple to read.  Most women will get a positive LH surge before day 20 of the cycle.    Do not use lubercants with intercourse when trying to get pregnant. Pre seed is okay.    Make appointment lab only (not fasting) cycle day 3 to check FSH/LH, estradiol and AMH      You send message on my chart, 'update about a visit in the last week' send his full name and  for semen analysis

## 2023-12-12 NOTE — PROGRESS NOTES
Coty is a 38 year old  female who presents for annual exam.     Menses are irregular and normal lasting  5  days.  Menses flow: normal.  Patient's last menstrual period was 2023.. Using none for contraception.  She is currently considering pregnancy.  Besides routine health maintenance,  she would like to discuss starting an anxiety medication and getting pregnant.  Anxiety has been an issue her entire life and runs in the family. Would like something short acting. PHQ=5 and BALBINA=8 today.  Unsure if anyone in her family is actually taking a medication that helps.  She has been sober for 2 years and that seems to have helped some of her anxiety and will be undergoing DBT.  Changed jobs and now working for [employer].  Has been with her spouse for 10 years and was using condoms until ?  Last 6 months her cycles are more irregular but traditionally 28 days.  She has not tried ovulation kits and timing intercourse can lead to more anxiety and be stressful.  Currently has some right breast pain on and off noted.  GYNECOLOGIC HISTORY:  Menarche:   Coty is sexually active with 1male partner(s) and is currently in monogamous relationship.    History sexually transmitted infections:No STD history  STI testing offered?  Declined  DOROTHY exposure: Unknown  History of abnormal Pap smear: NO - age 30-65 PAP every 5 years with negative HPV co-testing recommended  Family history of breast CA: No  Family history of uterine/ovarian CA: No    Family history of colon CA: Yes (Please explain):     HEALTH MAINTENANCE:  Cholesterol: (  Cholesterol   Date Value Ref Range Status   2022 226 (H) <200 mg/dL Final   2021 219 (H) <200 mg/dL Final   07/15/2020 202 (H) <200 mg/dL Final     Comment:     Desirable:       <200 mg/dl   2012 214 (H) 0 - 200 mg/dL Final     Comment:     LDL Cholesterol is the primary guide to therapy.   The NCEP recommends further evaluation of: patients with cholesterol greater   than 200  mg/dL if additional risk factors are present, cholesterol greater   than   240 mg/dL, triglycerides greater than 150 mg/dL, or HDL less than 40 mg/dL.    History of abnormal lipids: Yes  Mammo: na . History of abnormal Mammo: No.  Regular Self Breast Exams: No  Calcium/Vitamin D intake: source:  dairy, multivitamin Adequate? Yes  TSH: (  TSH   Date Value Ref Range Status   2023 1.42 0.30 - 4.20 uIU/mL Final   2021 1.77 0.40 - 4.00 mU/L Final   07/15/2020 2.05 0.40 - 4.00 mU/L Final    )  Pap; (  Lab Results   Component Value Date    PAP NIL 2012    )    HISTORY:  OB History    Para Term  AB Living   0 0 0 0 0 0   SAB IAB Ectopic Multiple Live Births   0 0 0 0 0     Past Medical History:   Diagnosis Date    CARDIOVASCULAR SCREENING; LDL GOAL LESS THAN 160 2012    Contraceptive management 2012    History of COVID-19     Jaw pain 2021    Nonintractable episodic headache, unspecified headache type 2022    Plantar warts 2012    Uncomplicated asthma      Past Surgical History:   Procedure Laterality Date    MANDIBLE SURGERY  2004    ORTHOPEDIC SURGERY      Jaw surgery    PE TUBES      as child.     Family History   Problem Relation Age of Onset    Osteoporosis Mother     Varicose Veins Mother     Hypertension Father     Allergies Father     Eye Disorder Father         Glasses    Anxiety Disorder Sister     Anxiety Disorder Sister     Thyroid Disease Maternal Grandmother     Heart Disease Maternal Grandfather         Pace Maker    Diabetes Maternal Grandfather         Type 2 late in life    Prostate Cancer Maternal Grandfather         Prostate    Eye Disorder Maternal Grandfather         Glasses    Hypertension Paternal Grandfather         Recently passed in 2021    Colon Cancer Other         Mothers grandmother and great grandmother    Mental Illness Other         Bipolar    Osteoporosis Other         Aunt    Thyroid Disease Other          Maternal aunt    Obesity Other         Maternal cousin     Social History     Socioeconomic History    Marital status:     Number of children: 0   Tobacco Use    Smoking status: Never    Smokeless tobacco: Never    Tobacco comments:     Never smoked   Vaping Use    Vaping Use: Never used   Substance and Sexual Activity    Alcohol use: Not Currently     Comment: rarely    Drug use: No    Sexual activity: Yes     Partners: Male     Birth control/protection: Condom   Other Topics Concern    Parent/sibling w/ CABG, MI or angioplasty before 65F 55M? No   Social History Narrative    Jun 2026: father in laws surgery went well in dec, just returned from [place] vacation, gave in notice and stress decreased since stopped work        Dec 2022: lives with  and dog.  to leave dec 21st to dad in [place] as father in law has some kidn of intestinal cancer         Dec 2021: lives with  who is of [place] origin, can speak and read some Nepali. 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       Current Outpatient Medications:     albuterol (PROAIR HFA/PROVENTIL HFA/VENTOLIN HFA) 108 (90 Base) MCG/ACT inhaler, Inhale 2 puffs into the lungs every 6 hours as needed for shortness of breath / dyspnea or wheezing (Patient not taking: Reported on 6/5/2023), Disp: 18 g, Rfl: 1    Iron-Vitamin C (IRON 100/C) 100-250 MG TABS, , Disp: , Rfl:     magnesium gluconate (MAGONATE) 500 (27 Mg) MG tablet, Take 1 tablet (500 mg) by mouth 2 times daily, Disp: , Rfl:     Prenatal MV-Min-Fe Fum-FA-DHA (PRENATAL 1 PO), , Disp: , Rfl:      Allergies   Allergen Reactions    Animal Dander Unknown    Dust Mites Unknown    Lactose GI Disturbance and Itching    Other Environmental Allergy      Cat, mold/ dust and mouse urine,     Sulfa Antibiotics Rash     As a child       Past medical, surgical, social and family history were reviewed and updated in  "EPIC.      EXAM:  /80   Pulse 96   Ht 1.791 m (5' 10.5\")   Wt 97.1 kg (214 lb)   LMP 11/23/2023   SpO2 98%   BMI 30.27 kg/m     BMI: Body mass index is 30.27 kg/m .  Constitutional: healthy, alert and no distress  Head: Normocephalic. No masses, lesions, tenderness or abnormalities  Neck: Neck supple. Trachea midline. No adenopathy. Thyroid symmetric, normal size.   Cardiovascular: RRR.   Respiratory: Negative.   Breast: No nodularity, asymmetry or nipple discharge bilaterally.  Gastrointestinal: Abdomen soft, non-tender, non-distended. No masses, organomegaly.  :  Vulva:   Labia minora agglutinated to labia majora and fusion noted over the clitoral rodriguez all consistent with lichen sclerosis (see images) normal female hair distribution, no inguinal adenopathy.    Urethra:  Midline, non-tender, well supported, no discharge  Vagina:  Moist, pink, no abnormal discharge, no lesions  Uterus:  Normal size , non-tender, freely mobile  Ovaries:  No masses appreciated, non-tender, mobile  Rectal Exam: deferred  Musculoskeletal: extremities normal  Skin: no suspicious lesions or rashes  Psychiatric: Affect appropriate, cooperative,mentation appears normal.     COUNSELING:   Reviewed preventive health counseling, as reflected in patient instructions       Family planning       Folic Acid   reports that she has never smoked. She has never used smokeless tobacco.    Body mass index is 30.27 kg/m .  Weight management plan: Discussed healthy diet and exercise guidelines  FRAX Risk Assessment    ASSESSMENT:  38 year old female with satisfactory annual exam  (Z01.419) Encounter for gynecological examination without abnormal finding  (primary encounter diagnosis)  Comment: trying for pregnancy  Plan: Lifestyle recommendations reviewed for pregnancy.    (F41.9) Anxiety  Comment: Entire life  Plan: hydrOXYzine yin (VISTARIL) 25 MG capsule,         sertraline (ZOLOFT) 50 MG tablet        Discussed fine to use Vistaril as " needed for immediate issues but sounds like she should be on something more long-term especially with pregnancy.  Discussed pregnancy usually creates more anxiety.  Recommended she try low-dose Zoloft and prescription was done.  Side effects discussed and she will [employer] [place] back with an e-visit in about 6 to 8 weeks to check on dosing.  Questions were answered.    (Z13.220) Screening for lipid disorders  Comment: fasting  Plan: Lipid panel reflex to direct LDL Fasting        Check lab today    (N92.6) Irregular menses  Comment: Starting fertility workup  Plan: Estradiol, Luteinizing Hormone, Follicle         stimulating hormone, Anti-Mullerian hormone,         Prolactin        Check a couple labs today and then will come back cycle day 3 for the rest of her labs.  Discussed semen analysis for her spouse and if she is not pregnant by April, then would do HSG.  She will let me know if she does not see positive ovulation predictor kit.    (L90.0) Lichen sclerosus  Comment: New diagnosis--found on exam  Plan: clobetasol (TEMOVATE) 0.05 % external ointment        Lichen discussed and diagrams used to aid patient understanding.  Will try temovate steroid taper and discussed if not feeling better in 1-2 months, would need to RTC for possible biopsy.  Questions answered and up-to-date pt info given.    (Z23) Need for prophylactic vaccination and inoculation against influenza  Plan: given today    (Z13.88) Screening for lead exposure  Comment: pt request  Plan: Lead Venous Blood Confirm        Check lab today  Notified of additional level of service with health care mantainence and possible coverage implications.     Katerine Leonard MD

## 2023-12-13 LAB
LEAD BLDV-MCNC: <2 UG/DL
MIS SERPL-MCNC: 0.17 NG/ML (ref 0.15–7.5)
PROLACTIN SERPL 3RD IS-MCNC: 21 NG/ML (ref 5–23)

## 2023-12-21 ENCOUNTER — LAB (OUTPATIENT)
Dept: LAB | Facility: CLINIC | Age: 38
End: 2023-12-21
Payer: COMMERCIAL

## 2023-12-21 DIAGNOSIS — N92.6 IRREGULAR MENSES: ICD-10-CM

## 2023-12-21 LAB
ESTRADIOL SERPL-MCNC: 14 PG/ML
FSH SERPL IRP2-ACNC: 8.6 MIU/ML
LH SERPL-ACNC: 5.2 MIU/ML

## 2023-12-21 PROCEDURE — 36415 COLL VENOUS BLD VENIPUNCTURE: CPT

## 2023-12-21 PROCEDURE — 83002 ASSAY OF GONADOTROPIN (LH): CPT

## 2023-12-21 PROCEDURE — 83001 ASSAY OF GONADOTROPIN (FSH): CPT

## 2023-12-21 PROCEDURE — 82670 ASSAY OF TOTAL ESTRADIOL: CPT

## 2023-12-28 ENCOUNTER — PATIENT OUTREACH (OUTPATIENT)
Dept: CARE COORDINATION | Facility: CLINIC | Age: 38
End: 2023-12-28
Payer: COMMERCIAL

## 2023-12-29 PROBLEM — M26.609 TMJ (TEMPOROMANDIBULAR JOINT SYNDROME): Status: RESOLVED | Noted: 2022-01-01 | Resolved: 2023-12-29

## 2023-12-29 NOTE — PROGRESS NOTES
DISCHARGE  Reason for Discharge: Patient was unable to continue PT due to insurance.      Equipment Issued: none    Discharge Plan: Patient to continue home program.    Referring Provider:  Mary Ann Taylor

## 2024-01-03 ENCOUNTER — MYC MEDICAL ADVICE (OUTPATIENT)
Dept: OBGYN | Facility: CLINIC | Age: 39
End: 2024-01-03
Payer: COMMERCIAL

## 2024-01-08 ENCOUNTER — MYC MEDICAL ADVICE (OUTPATIENT)
Dept: OBGYN | Facility: CLINIC | Age: 39
End: 2024-01-08
Payer: COMMERCIAL

## 2024-01-11 ENCOUNTER — PATIENT OUTREACH (OUTPATIENT)
Dept: CARE COORDINATION | Facility: CLINIC | Age: 39
End: 2024-01-11
Payer: COMMERCIAL

## 2024-01-18 ENCOUNTER — MYC MEDICAL ADVICE (OUTPATIENT)
Dept: FAMILY MEDICINE | Facility: CLINIC | Age: 39
End: 2024-01-18
Payer: COMMERCIAL

## 2024-01-18 NOTE — TELEPHONE ENCOUNTER
Her physical was already done with her gynecologist in December.  If she is allowed another physical because it is a new year and a new insurance then we can address it at her physical.  A physical addresses  preventive care and any concerns would be covered under office visit it would still be cheaper than coming in for 2 separate visits  Just like when she saw the gynecologist for the physical in December they did the physical and additional concern of anxiety was probably billed under office visit.

## 2024-01-23 ENCOUNTER — E-VISIT (OUTPATIENT)
Dept: OBGYN | Facility: CLINIC | Age: 39
End: 2024-01-23
Payer: COMMERCIAL

## 2024-01-23 ENCOUNTER — TRANSFERRED RECORDS (OUTPATIENT)
Dept: HEALTH INFORMATION MANAGEMENT | Facility: CLINIC | Age: 39
End: 2024-01-23

## 2024-01-23 DIAGNOSIS — F41.9 ANXIETY: Primary | ICD-10-CM

## 2024-01-23 PROCEDURE — 99421 OL DIG E/M SVC 5-10 MIN: CPT | Performed by: OBSTETRICS & GYNECOLOGY

## 2024-01-23 ASSESSMENT — ANXIETY QUESTIONNAIRES
1. FEELING NERVOUS, ANXIOUS, OR ON EDGE: SEVERAL DAYS
GAD7 TOTAL SCORE: 5
2. NOT BEING ABLE TO STOP OR CONTROL WORRYING: SEVERAL DAYS
3. WORRYING TOO MUCH ABOUT DIFFERENT THINGS: SEVERAL DAYS
GAD7 TOTAL SCORE: 5
8. IF YOU CHECKED OFF ANY PROBLEMS, HOW DIFFICULT HAVE THESE MADE IT FOR YOU TO DO YOUR WORK, TAKE CARE OF THINGS AT HOME, OR GET ALONG WITH OTHER PEOPLE?: SOMEWHAT DIFFICULT
4. TROUBLE RELAXING: SEVERAL DAYS
7. FEELING AFRAID AS IF SOMETHING AWFUL MIGHT HAPPEN: NOT AT ALL
6. BECOMING EASILY ANNOYED OR IRRITABLE: SEVERAL DAYS
GAD7 TOTAL SCORE: 5
7. FEELING AFRAID AS IF SOMETHING AWFUL MIGHT HAPPEN: NOT AT ALL
5. BEING SO RESTLESS THAT IT IS HARD TO SIT STILL: NOT AT ALL

## 2024-01-23 ASSESSMENT — PATIENT HEALTH QUESTIONNAIRE - PHQ9
SUM OF ALL RESPONSES TO PHQ QUESTIONS 1-9: 3
SUM OF ALL RESPONSES TO PHQ QUESTIONS 1-9: 3
10. IF YOU CHECKED OFF ANY PROBLEMS, HOW DIFFICULT HAVE THESE PROBLEMS MADE IT FOR YOU TO DO YOUR WORK, TAKE CARE OF THINGS AT HOME, OR GET ALONG WITH OTHER PEOPLE: SOMEWHAT DIFFICULT

## 2024-01-24 ASSESSMENT — ANXIETY QUESTIONNAIRES: GAD7 TOTAL SCORE: 5

## 2024-01-24 ASSESSMENT — PATIENT HEALTH QUESTIONNAIRE - PHQ9: SUM OF ALL RESPONSES TO PHQ QUESTIONS 1-9: 3

## 2024-01-30 ENCOUNTER — TELEPHONE (OUTPATIENT)
Dept: BEHAVIORAL HEALTH | Facility: CLINIC | Age: 39
End: 2024-01-30
Payer: COMMERCIAL

## 2024-01-30 LAB
ABO (EXTERNAL): NORMAL
HEPATITIS C ANTIBODY (EXTERNAL): NONREACTIVE
HIV1+2 AB SERPL QL IA: NONREACTIVE
RH (EXTERNAL): POSITIVE
RUBELLA ANTIBODY IGG (EXTERNAL): NONREACTIVE

## 2024-01-30 NOTE — TELEPHONE ENCOUNTER
----- Message from Michael Hameed sent at 1/30/2024 12:45 PM CST -----  Transition Clinic Referral   [place]       Please Check Type of Referral Requested:   x____MEDICATION:   Referrals for Medication are ONLY accepted from the following areas (select): Other..... STANDARD PRIORITY                                        Suboxone and Opioid Management Referrals are automatically denied.   TC Psychiatry cannot see patient without active medical insurance.   Next level of psychiatry care must be within 12 months.  TC Psychiatry cannot accept patient with next level of care scheduled with PCP.  The transition clinic cannot follow patients who are on a restricted recipient program.      Referring Provider Contact Name: [facility]; Phone Number: [phone]    Reason for Transition Clinic Referral: psychiatry    Next Level of Care Patient Will Be Transitioned To: [facility]  Provider(s)Maria Del Rosario Napier  Location [facility]  Date/Time 3/29/2024    What Would Be Helpful from the Transition Clinic: psychiatry     Needs: NO    Does Patient Have Access to Technology: yes    Patient E-mail Address: [email or web address]    Current Patient Phone Number: [phone]; n/a    Clinician Gender Preference (if applicable): unknown    Patient location preference: Virtual    Michael Hameed      (Master Form: Updated 11/28/2023)

## 2024-01-30 NOTE — TELEPHONE ENCOUNTER
First attempt at reaching patient. Left message asking for a return call to schedule with the TC. MyChart message sent. Will postpone for follow up tomorrow.    Kenia Aguilera  Transition Clinic Coordinator  01/30/24 12:50 PM

## 2024-01-31 ENCOUNTER — TELEPHONE (OUTPATIENT)
Dept: BEHAVIORAL HEALTH | Facility: CLINIC | Age: 39
End: 2024-01-31
Payer: COMMERCIAL

## 2024-01-31 NOTE — TELEPHONE ENCOUNTER
Second attempt to contact pt. Writer left a VM with TC contact info and encouraged a phone call back to schedule initial therapy appointment. Coordinator will marielle referral as complete.Tracker completed.    Nettie Peralta  01/31/2024  [phone]

## 2024-01-31 NOTE — TELEPHONE ENCOUNTER
----- Message from Michael Hameed sent at 1/30/2024 12:45 PM CST -----  Transition Clinic Referral   [place]       Please Check Type of Referral Requested:   x____MEDICATION:   Referrals for Medication are ONLY accepted from the following areas (select): Other..... STANDARD PRIORITY                                        Suboxone and Opioid Management Referrals are automatically denied.   TC Psychiatry cannot see patient without active medical insurance.   Next level of psychiatry care must be within 12 months.  TC Psychiatry cannot accept patient with next level of care scheduled with PCP.  The transition clinic cannot follow patients who are on a restricted recipient program.      Referring Provider Contact Name: Located within [facility]; Phone Number: [phone]    Reason for Transition Clinic Referral: psychiatry    Next Level of Care Patient Will Be Transitioned To: Located within [facility]  Provider(s)Maria Del Rosario Napier  Location Located within [facility]  Date/Time 3/29/2024    What Would Be Helpful from the Transition Clinic: psychiatry     Needs: NO    Does Patient Have Access to Technology: yes    Patient E-mail Address: [email or web address]    Current Patient Phone Number: [phone]; n/a    Clinician Gender Preference (if applicable): unknown    Patient location preference: Virtual    Michael Hameed      (Master Form: Updated 11/28/2023)

## 2024-02-01 ENCOUNTER — E-VISIT (OUTPATIENT)
Dept: OBGYN | Facility: CLINIC | Age: 39
End: 2024-02-01
Payer: COMMERCIAL

## 2024-02-01 DIAGNOSIS — U07.1 INFECTION DUE TO 2019 NOVEL CORONAVIRUS: Primary | ICD-10-CM

## 2024-02-01 PROCEDURE — 99421 OL DIG E/M SVC 5-10 MIN: CPT | Performed by: OBSTETRICS & GYNECOLOGY

## 2024-02-19 ENCOUNTER — MYC REFILL (OUTPATIENT)
Dept: OBGYN | Facility: CLINIC | Age: 39
End: 2024-02-19
Payer: COMMERCIAL

## 2024-02-19 DIAGNOSIS — F41.9 ANXIETY: ICD-10-CM

## 2024-02-21 NOTE — TELEPHONE ENCOUNTER
Alternate ice / heat as tolerated  Get plenty of rest and drink plenty of water   Alternate Tylenol/Motrin as needed for pain   Avoid excessive twisting / bending / lifting  Expect symptoms to start improving over next few days  Follow up with your primary care provider   Provider E-Visit time total (minutes): 6

## 2024-03-12 ENCOUNTER — E-VISIT (OUTPATIENT)
Dept: OBGYN | Facility: CLINIC | Age: 39
End: 2024-03-12
Payer: COMMERCIAL

## 2024-03-12 DIAGNOSIS — F32.A DEPRESSION, UNSPECIFIED DEPRESSION TYPE: Primary | ICD-10-CM

## 2024-03-12 PROCEDURE — 99207 PR NO BILLABLE SERVICE THIS VISIT: CPT | Performed by: OBSTETRICS & GYNECOLOGY

## 2024-03-12 ASSESSMENT — PATIENT HEALTH QUESTIONNAIRE - PHQ9
SUM OF ALL RESPONSES TO PHQ QUESTIONS 1-9: 2
SUM OF ALL RESPONSES TO PHQ QUESTIONS 1-9: 2
10. IF YOU CHECKED OFF ANY PROBLEMS, HOW DIFFICULT HAVE THESE PROBLEMS MADE IT FOR YOU TO DO YOUR WORK, TAKE CARE OF THINGS AT HOME, OR GET ALONG WITH OTHER PEOPLE: SOMEWHAT DIFFICULT

## 2024-03-12 ASSESSMENT — ANXIETY QUESTIONNAIRES
7. FEELING AFRAID AS IF SOMETHING AWFUL MIGHT HAPPEN: NOT AT ALL
1. FEELING NERVOUS, ANXIOUS, OR ON EDGE: NOT AT ALL
7. FEELING AFRAID AS IF SOMETHING AWFUL MIGHT HAPPEN: NOT AT ALL
GAD7 TOTAL SCORE: 2
8. IF YOU CHECKED OFF ANY PROBLEMS, HOW DIFFICULT HAVE THESE MADE IT FOR YOU TO DO YOUR WORK, TAKE CARE OF THINGS AT HOME, OR GET ALONG WITH OTHER PEOPLE?: SOMEWHAT DIFFICULT
GAD7 TOTAL SCORE: 2
4. TROUBLE RELAXING: SEVERAL DAYS
5. BEING SO RESTLESS THAT IT IS HARD TO SIT STILL: NOT AT ALL
3. WORRYING TOO MUCH ABOUT DIFFERENT THINGS: SEVERAL DAYS
2. NOT BEING ABLE TO STOP OR CONTROL WORRYING: NOT AT ALL
GAD7 TOTAL SCORE: 2
6. BECOMING EASILY ANNOYED OR IRRITABLE: NOT AT ALL

## 2024-03-13 ASSESSMENT — ANXIETY QUESTIONNAIRES: GAD7 TOTAL SCORE: 2

## 2024-03-13 ASSESSMENT — PATIENT HEALTH QUESTIONNAIRE - PHQ9: SUM OF ALL RESPONSES TO PHQ QUESTIONS 1-9: 2

## 2024-03-13 NOTE — PATIENT INSTRUCTIONS
Thank you for choosing us for your care. Based on your symptoms and length of illness, I do not think that you need a prescription at this time.  Please follow the care advise I've provided and use the over the counter medications to help relieve your symptoms.  View your full visit summary for details by clicking on the link below.     If you're not feeling better within 4-6 weeks, please respond to this message and we can consider if a prescription is needed.  You can schedule an appointment right here in [facility], or call [phone]  If the visit is for the same symptoms as your eVisit, we'll refund the cost of your eVisit if seen within seven days.

## 2024-03-21 ENCOUNTER — LAB (OUTPATIENT)
Dept: LAB | Facility: CLINIC | Age: 39
End: 2024-03-21
Attending: OBSTETRICS & GYNECOLOGY
Payer: COMMERCIAL

## 2024-03-21 ENCOUNTER — E-VISIT (OUTPATIENT)
Dept: OBGYN | Facility: CLINIC | Age: 39
End: 2024-03-21
Payer: COMMERCIAL

## 2024-03-21 DIAGNOSIS — R68.89 FLU-LIKE SYMPTOMS: ICD-10-CM

## 2024-03-21 DIAGNOSIS — R68.89 FLU-LIKE SYMPTOMS: Primary | ICD-10-CM

## 2024-03-21 LAB
FLUAV RNA SPEC QL NAA+PROBE: NEGATIVE
FLUBV RNA RESP QL NAA+PROBE: NEGATIVE
RSV RNA SPEC NAA+PROBE: NEGATIVE
SARS-COV-2 RNA RESP QL NAA+PROBE: NEGATIVE

## 2024-03-21 PROCEDURE — 87637 SARSCOV2&INF A&B&RSV AMP PRB: CPT

## 2024-03-21 PROCEDURE — 98970 NQHP OL DIG ASSMT&MGMT 5-10: CPT | Performed by: OBSTETRICS & GYNECOLOGY

## 2024-03-21 NOTE — PATIENT INSTRUCTIONS
Coty,    Thank you for choosing us for your care. I have placed an order for a lab test(s). View your full visit summary for details by clicking on the link below. You can schedule a lab only appointment right here in [employer], or by calling [phone].    You will receive your lab results and next steps via [employer] when the lab results return.    Sincerely,  Kailee Bautista MD

## 2024-03-28 ENCOUNTER — TRANSCRIBE ORDERS (OUTPATIENT)
Dept: MATERNAL FETAL MEDICINE | Facility: CLINIC | Age: 39
End: 2024-03-28

## 2024-03-28 ENCOUNTER — VIRTUAL VISIT (OUTPATIENT)
Dept: OBGYN | Facility: CLINIC | Age: 39
End: 2024-03-28
Payer: COMMERCIAL

## 2024-03-28 VITALS — HEIGHT: 71 IN | BODY MASS INDEX: 30.27 KG/M2

## 2024-03-28 DIAGNOSIS — Z83.49 FAMILY HISTORY OF THYROID DISEASE: ICD-10-CM

## 2024-03-28 DIAGNOSIS — O09.519 ENCOUNTER FOR SUPERVISION OF PRIMIGRAVIDA OF ADVANCED MATERNAL AGE: Primary | ICD-10-CM

## 2024-03-28 DIAGNOSIS — Z23 NEED FOR TDAP VACCINATION: ICD-10-CM

## 2024-03-28 DIAGNOSIS — O26.90 PREGNANCY RELATED CONDITION, ANTEPARTUM: Primary | ICD-10-CM

## 2024-03-28 PROCEDURE — 99207 PR NO CHARGE NURSE ONLY: CPT | Mod: 93

## 2024-03-28 RX ORDER — FAMOTIDINE 20 MG
TABLET ORAL
COMMUNITY

## 2024-03-28 NOTE — PROGRESS NOTES
Important Information for Provider:     New ob nurse intake by phone, first pregnancy, AMA. Patient saw infertility 1/2024 but couple became pregnant on their own. She had blood drawn but that was before pregnancy.Handouts reviewed. Ordered genetic screening.  Patient started seeing a nutritionist and is active with yoga, piliates, walks dog daily.     Patient has normal periods, occur every 28 days. Ultrasound and NOB with Dr Leonard  4/25/2024.     Caffeine intake/servings daily - 0  drinks decaf  Calcium intake/servings daily - 3  Exercise 5 times weekly - describe  yoga, piliates, walks dog daily.   Precautions given  Sunscreen used - Yes  Seatbelts used - Yes  Guns stored in the home - No  Self Breast Exam - Yes  Pap test up to date -  Yes  Dental exam up to date -  Yes  Immunizations reviewed and up to date - Yes  Abuse: Current or Past (Physical, Sexual or Emotional) - No  Do you feel safe in your environment - Yes  Do you cope well with stress - Yes      Prenatal OB Questionnaire  Patient supplied answers from flow sheet for:  Prenatal OB Questionnaire.  Past Medical History  Have you ever received care for your mental health? : (!) Yes  Have you ever been in a major accident or suffered serious trauma?: No  Within the last year, has anyone hit, slapped, kicked or otherwise hurt you?: No  In the last year, has anyone forced you to have sex when you didn't want to?: No    Past Medical History 2   Have you ever received a blood transfusion?: No  Would you accept a blood transfusion if was medically recommended?: Yes  Does anyone in your home smoke?: No   Is your blood type Rh negative?: No  Have you ever ?: No  Have you been hospitalized for a nonsurgical reason excluding normal delivery?: No  Have you ever had an abnormal pap smear?: No    Past Medical History (Continued)  Do you have a history of abnormalities of the uterus?: No  Did your mother take DOROTHY or any other hormones when she was pregnant  with you?: Unknown  Do you have any other problems we have not asked about which you feel may be important to this pregnancy?: (!) Yes                     Allergies as of 3/28/2024:    Allergies as of 03/28/2024 - Reviewed 03/28/2024   Allergen Reaction Noted    Animal dander Unknown 12/26/2022    Dust mites Unknown 12/26/2022    Lactose GI Disturbance and Itching 12/31/2021    Other environmental allergy  12/12/2022    Sulfa antibiotics Rash 04/17/2012       Current medications are:  Current Outpatient Medications   Medication Sig Dispense Refill    Iron-Vitamin C (IRON 100/C) 100-250 MG TABS       Prenatal Vit-DSS-Fe Cbn-FA (PRENATAL AD PO)       sertraline (ZOLOFT) 50 MG tablet Take 0.5 tablets (25 mg) by mouth daily For 6 days, then increase to one tablet daily 90 tablet 4    Vitamin D, Cholecalciferol, 25 MCG (1000 UT) CAPS            Early ultrasound screening tool:    Does patient have irregular periods?  No  Did patient use hormonal birth control in the three months prior to positive urine pregnancy test? No  Is the patient breastfeeding?  No  Is the patient 10 weeks or greater at time of education visit?  No

## 2024-03-29 ENCOUNTER — VIRTUAL VISIT (OUTPATIENT)
Dept: PSYCHIATRY | Facility: CLINIC | Age: 39
End: 2024-03-29
Payer: COMMERCIAL

## 2024-03-29 DIAGNOSIS — F33.41 MAJOR DEPRESSIVE DISORDER, RECURRENT EPISODE, IN PARTIAL REMISSION (H): ICD-10-CM

## 2024-03-29 DIAGNOSIS — F41.1 GAD (GENERALIZED ANXIETY DISORDER): Primary | ICD-10-CM

## 2024-03-29 PROCEDURE — 99204 OFFICE O/P NEW MOD 45 MIN: CPT | Mod: 95 | Performed by: NURSE PRACTITIONER
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
## 2024-03-29 NOTE — NURSING NOTE
Is the patient currently in the state of [place]? YES    Visit mode:VIDEO    If the visit is dropped, the patient can be reconnected by: VIDEO VISIT: Text to cell phone:   Telephone Information:   Mobile [phone]       Will anyone else be joining the visit? NO  (If patient encounters technical issues they should call [phone]    How would you like to obtain your AVS? MyChart    Are changes needed to the allergy or medication list? No    Reason for visit: Consult    Pennie ENG

## 2024-04-14 ENCOUNTER — LAB (OUTPATIENT)
Dept: LAB | Facility: CLINIC | Age: 39
End: 2024-04-14
Payer: COMMERCIAL

## 2024-04-14 DIAGNOSIS — N91.2 AMENORRHEA: ICD-10-CM

## 2024-04-14 PROCEDURE — 84702 CHORIONIC GONADOTROPIN TEST: CPT

## 2024-04-14 PROCEDURE — 36415 COLL VENOUS BLD VENIPUNCTURE: CPT

## 2024-04-16 LAB — HCG INTACT+B SERPL-ACNC: ABNORMAL MIU/ML

## 2024-04-23 ENCOUNTER — MYC MEDICAL ADVICE (OUTPATIENT)
Dept: OBGYN | Facility: CLINIC | Age: 39
End: 2024-04-23
Payer: COMMERCIAL

## 2024-04-25 ENCOUNTER — PRENATAL OFFICE VISIT (OUTPATIENT)
Dept: OBGYN | Facility: CLINIC | Age: 39
End: 2024-04-25
Payer: COMMERCIAL

## 2024-04-25 ENCOUNTER — ANCILLARY PROCEDURE (OUTPATIENT)
Dept: ULTRASOUND IMAGING | Facility: CLINIC | Age: 39
End: 2024-04-25
Payer: COMMERCIAL

## 2024-04-25 VITALS
HEART RATE: 107 BPM | HEIGHT: 71 IN | TEMPERATURE: 98.2 F | SYSTOLIC BLOOD PRESSURE: 123 MMHG | DIASTOLIC BLOOD PRESSURE: 80 MMHG | OXYGEN SATURATION: 99 % | WEIGHT: 221.3 LBS | BODY MASS INDEX: 30.98 KG/M2

## 2024-04-25 DIAGNOSIS — O09.519 SUPERVISION OF PRIMIGRAVIDA OF ADVANCED MATERNAL AGE, ANTEPARTUM: Primary | ICD-10-CM

## 2024-04-25 DIAGNOSIS — L53.9 BREAST ERYTHEMA: ICD-10-CM

## 2024-04-25 DIAGNOSIS — O09.519 ENCOUNTER FOR SUPERVISION OF PRIMIGRAVIDA OF ADVANCED MATERNAL AGE: ICD-10-CM

## 2024-04-25 LAB
ALBUMIN UR-MCNC: NEGATIVE MG/DL
APPEARANCE UR: CLEAR
BILIRUB UR QL STRIP: NEGATIVE
COLOR UR AUTO: YELLOW
GLUCOSE UR STRIP-MCNC: NEGATIVE MG/DL
HGB UR QL STRIP: NEGATIVE
KETONES UR STRIP-MCNC: NEGATIVE MG/DL
LEUKOCYTE ESTERASE UR QL STRIP: NEGATIVE
NITRATE UR QL: NEGATIVE
PH UR STRIP: 6 [PH] (ref 5–7)
SP GR UR STRIP: >=1.03 (ref 1–1.03)
UROBILINOGEN UR STRIP-ACNC: 0.2 E.U./DL

## 2024-04-25 PROCEDURE — 81003 URINALYSIS AUTO W/O SCOPE: CPT | Performed by: OBSTETRICS & GYNECOLOGY

## 2024-04-25 PROCEDURE — 76801 OB US < 14 WKS SINGLE FETUS: CPT | Performed by: OBSTETRICS & GYNECOLOGY

## 2024-04-25 PROCEDURE — 99213 OFFICE O/P EST LOW 20 MIN: CPT | Mod: 25 | Performed by: OBSTETRICS & GYNECOLOGY

## 2024-04-25 PROCEDURE — 87086 URINE CULTURE/COLONY COUNT: CPT | Performed by: OBSTETRICS & GYNECOLOGY

## 2024-04-25 RX ORDER — CEPHALEXIN 500 MG/1
500 CAPSULE ORAL 2 TIMES DAILY
Qty: 10 CAPSULE | Refills: 0 | Status: SHIPPED | OUTPATIENT
Start: 2024-04-25 | End: 2024-04-30

## 2024-04-25 NOTE — PROGRESS NOTES
"Dates by LMP c/w 10 wk u/s. Has first tri screen scheduled 5/6, discussed AFP. This was spontaneous pregnancy when they were going to do a trial IVF transfer. Happy and still not really believing it. Discussed calcium intake and nausea--getting better. Still exercising. Having some hot flashes. Has erythema that blanches on both breasts, warm--will give keflex BID for 5 days and have her see our lactation provider.   Providers/residents, weight gain/exercise, delivery discussed. Check RPR with AFP. BE    HPI:  Coty LEE Pattie Doyle is a 39 year old female Patient's last menstrual period was 02/10/2024. at 10w5d, Estimated Date of Delivery: Nov 16, 2024.  She denies vaginal bleeding and abdominal pain. Super happy about pregnancy but nervous something could happen.   No other c/o.    Past Medical History:   Diagnosis Date    CARDIOVASCULAR SCREENING; LDL GOAL LESS THAN 160 04/17/2012    Contraceptive management 04/18/2012    History of COVID-19     Jaw pain 12/31/2021    Nonintractable episodic headache, unspecified headache type 01/01/2022    Plantar warts 07/06/2012    Uncomplicated asthma        Past Surgical History:   Procedure Laterality Date    MANDIBLE SURGERY  08/2004    ORTHOPEDIC SURGERY  2004    Jaw surgery    PE TUBES      as child.       Allergies: Animal dander, Dust mites, Lactose, Other environmental allergy, and Sulfa antibiotics     EXAM:  Blood pressure 123/80, pulse 107, temperature 98.2  F (36.8  C), height 1.791 m (5' 10.5\"), weight 100.4 kg (221 lb 4.8 oz), last menstrual period 02/10/2024, SpO2 99%, not currently breastfeeding.   BMI= Body mass index is 31.3 kg/m .  General - pleasant female in no acute distress.  Breast - bilateral breasts with erythema on lower aspects but not even. No induration. Blanches with exam. No nodularity felt.   Abdomen - soft, nontender, nondistended, no hepatosplenomegaly.  Pelvic - deferred.  Musculoskeletal - no gross deformities.  Neurological - normal " "strength, sensation, and mental status.    Doptones were not done  Had ultrasound just prior to appt today that measured c/w dates    ASSESSMENT/PLAN:  (O09.519) Supervision of primigravida of advanced maternal age, antepartum  (primary encounter diagnosis)  Comment: spontaneous  Plan: reviewed AMA info and plan with pregnancy. Continue zoloft and monitor mood.     (L53.9) Breast erythema  Comment: ??mastitis  Plan: cephALEXin (KEFLEX) 500 MG capsule        Had 2 other providers also look at the breasts. No induration noted, but warm and blanching erythema. Has been present a \"few weeks\". Itchy but doesn't look like a contact dermatitis. Will have her see lactation.      Weight gain and exercise during pregnancy was discussed at today's visit.  The patient will return to clinic in 4 weeks for continued prenatal care. Ahead on weight gain right now.     "

## 2024-04-27 PROBLEM — O09.519 SUPERVISION OF PRIMIGRAVIDA OF ADVANCED MATERNAL AGE, ANTEPARTUM: Status: ACTIVE | Noted: 2024-04-27

## 2024-04-27 LAB — BACTERIA UR CULT: NORMAL

## 2024-05-01 ENCOUNTER — PRE VISIT (OUTPATIENT)
Dept: MATERNAL FETAL MEDICINE | Facility: CLINIC | Age: 39
End: 2024-05-01
Payer: COMMERCIAL

## 2024-05-01 ASSESSMENT — EDINBURGH POSTNATAL DEPRESSION SCALE (EPDS)
I HAVE BEEN SO UNHAPPY THAT I HAVE HAD DIFFICULTY SLEEPING: NOT VERY OFTEN
THE THOUGHT OF HARMING MYSELF HAS OCCURRED TO ME: NEVER
I HAVE FELT SCARED OR PANICKY FOR NO GOOD REASON: NO, NOT MUCH
I HAVE BEEN SO UNHAPPY THAT I HAVE BEEN CRYING: ONLY OCCASIONALLY
TOTAL SCORE: 6
I HAVE FELT SAD OR MISERABLE: NO, NOT AT ALL
I HAVE LOOKED FORWARD WITH ENJOYMENT TO THINGS: AS MUCH AS I EVER DID
I HAVE BEEN ABLE TO LAUGH AND SEE THE FUNNY SIDE OF THINGS: AS MUCH AS I ALWAYS COULD
I HAVE BLAMED MYSELF UNNECESSARILY WHEN THINGS WENT WRONG: NOT VERY OFTEN
THINGS HAVE BEEN GETTING ON TOP OF ME: NO, I HAVE BEEN COPING AS WELL AS EVER
I HAVE BEEN ANXIOUS OR WORRIED FOR NO GOOD REASON: YES, SOMETIMES

## 2024-05-01 NOTE — PROGRESS NOTES
"A:     39 year old primigravida at 11 weeks gestation   Bilateral, symmetric breast erythema without other symptoms, decreasing slightly after course of Keflex:  possible low-grade skin infection vs increased vascularity of early pregnancy vs dermatitis  3.    Planning breastfeeding    P:    As she has seen improvement after Keflex, would continue to observe.  Could also apply OTC hydrocortisone ointment to area to manage possible dermatitis.    Reviewed importance of avoiding scented or colored skin creams, and reviewing any changes in soaps, laundry detergents, skin care, fabrics.  Consider different bra.  Can apply mild moisturizer to areolae and nipples, such as coconut oil, Cetaphil or Cerave lotions.  Provided with resources on breastfeeding and suggestions for classes.  Invited to come for full prenatal consultation later in pregnancy if desired.      S:  Coty is here at 11 1/2 weeks gestation referred by Dr. Harriet Leonard for concern about breast redness.  She reports that a few weeks ago she developed redness in both breasts, symmetrically in the lower medial quadrants.  Area was not painful or itchy, with no skin changes other than feeling slightly swollen. No flaking, spots or skin damage.  No masses.  She was provided with a short course of Keflex by Dr. Leonard to address possible infection, and does feel she has had some improvement, although some redness remains.  Has noticed that prior to pregnancy, she tends to have lumpy/tender breasts with cycles.  Coty states that she is suspicious about irritation from bra possibly causing the inflammation, based on the distribution just in the lower central parts of her breasts and the symmetry.     Has noticed breast changes in pregnancy including enlargement, darkening, nipple growth and dryness of nipple-areolar complex.  She is planning breastfeeding and is interested in resources.    Relevant History:   Anxiety managed with Zoloft  Infertility and \"low " "estrogen and egg count,\" but conceived without intervention  Headaches, IBS  IBS  No history of breast surgeries.     She is fully vaccinated for Covid-19.     Breast exam:   Nipples: Nipples and areolae are dry and slightly flaky bilaterally. Nipples somewhat short-shafted.  Breasts:  Erythema bilaterally, just in central parts of lower quadrants, very symmetric. Does not feel warm to touch, no skin damage or peau d'orange.  States that redness is decreasing.     /74 (BP Location: Right arm, Patient Position: Sitting, Cuff Size: Adult Regular)   Pulse 93   LMP 02/10/2024   SpO2 99%   OB History    Para Term  AB Living   1 0 0 0 0 0   SAB IAB Ectopic Multiple Live Births   0 0 0 0 0      # Outcome Date GA Lbr Dustin/2nd Weight Sex Type Anes PTL Lv   1 Current              Past Medical History:   Diagnosis Date    CARDIOVASCULAR SCREENING; LDL GOAL LESS THAN 160 2012    Contraceptive management 2012    History of COVID-19     Jaw pain 2021    Nonintractable episodic headache, unspecified headache type 2022    Plantar warts 2012    Uncomplicated asthma      Past Surgical History:   Procedure Laterality Date    MANDIBLE SURGERY  2004    ORTHOPEDIC SURGERY      Jaw surgery    PE TUBES      as child.     Family History   Problem Relation Age of Onset    Osteoporosis Mother     Varicose Veins Mother     Hypertension Father     Allergies Father     Eye Disorder Father         Glasses    Thyroid Disease Maternal Grandmother     Heart Disease Maternal Grandfather         Pace Maker    Diabetes Maternal Grandfather         Type 2 late in life    Prostate Cancer Maternal Grandfather         Prostate    Eye Disorder Maternal Grandfather         Glasses    Hypertension Paternal Grandfather         Recently passed in 2021    Anxiety Disorder Sister     Anxiety Disorder Sister     Thyroid Disease Other         Maternal aunt    Obesity Other         Maternal cousin "    Cancer Maternal Aunt     Coronary Artery Disease No family hx of        Current Outpatient Medications:     albuterol (PROAIR HFA/PROVENTIL HFA/VENTOLIN HFA) 108 (90 BASE) MCG/ACT Inhaler, Inhale 2 puffs into the lungs as needed for shortness of breath or wheezing, Disp: , Rfl:     Iron-Vitamin C (IRON 100/C) 100-250 MG TABS, , Disp: , Rfl:     Prenatal Vit-DSS-Fe Cbn-FA (PRENATAL AD PO), , Disp: , Rfl:     sertraline (ZOLOFT) 50 MG tablet, Take 0.5 tablets (25 mg) by mouth daily For 6 days, then increase to one tablet daily, Disp: 90 tablet, Rfl: 4    Vitamin D, Cholecalciferol, 25 MCG (1000 UT) CAPS, , Disp: , Rfl:         Time spent:  Chart review/Pre-chartin min prior to day of service  Face-to-face visit:  23 min  Documentation:  7 min  Total time spent on day of service:  30 min     PALOMA Oivedo, CNM, IBCLC

## 2024-05-02 ENCOUNTER — OFFICE VISIT (OUTPATIENT)
Dept: MIDWIFE SERVICES | Facility: CLINIC | Age: 39
End: 2024-05-02
Payer: COMMERCIAL

## 2024-05-02 VITALS — OXYGEN SATURATION: 99 % | SYSTOLIC BLOOD PRESSURE: 118 MMHG | HEART RATE: 93 BPM | DIASTOLIC BLOOD PRESSURE: 74 MMHG

## 2024-05-02 DIAGNOSIS — N61.0 BREAST INFLAMMATION: Primary | ICD-10-CM

## 2024-05-02 PROCEDURE — 99214 OFFICE O/P EST MOD 30 MIN: CPT | Performed by: ADVANCED PRACTICE MIDWIFE

## 2024-05-02 NOTE — Clinical Note
Dr. Horacio Cabrales:  I saw your patient, Coty Doyle, today.  I am not really sure what is going on with her breasts, whether it was an inflammatory process, some dermatitis, or maybe just some weirdly intense vascular reaction to her breast growth, since she is really fair.  It is right where a bra would hit tightly.  She feels it's getting better, though, so I just said to observe!  Minimally helpful, probably, but we enjoyed our visit together!  Wendy

## 2024-05-02 NOTE — PATIENT INSTRUCTIONS
"Lotions to try on your nipples/areolae:  Cetaphil  Cerave  Coconut oil        A great place to start for breastfeeding information is with a prenatal breastfeeding class, if you haven't considered that.  There are a number of both in-person and virtual options.  Here are a few, although this isn't a complete list:    [employer] has many childbirth classes, including breastfeeding, and for families who use state-sponsored health insurance,most classes are 100% covered!   https://www.Company.org/class-listing  [employer] is offering breastfeeding and  care classes, both online and in-person:               https://www.KnockaTV/childbirth-ed  [employer] childbirth and breastfeeding education offering virtual and in-person prenatal breastfeeding classes  https://www.birthedmn.com/specialty-workshops  [employer] has a number of offerings:     https://Amartus/classes/    There is also a really wonderful video that shows you how to establish a good milk supply and avoid nipple pain, by doing some practice at the end of pregnancy and then using your hands after the baby comes.  It shows you ways to get the baby latched on well and comfortably after birth--this is the main thing that helps to get a good milk supply established and avoid nipple pain.  You can find it here:  www.Joroto.com.  Click on \"expecting a term baby.\"  I think you'll find it really helpful!  The whole site is great, actually, but start with that.  I also love the video on attachment, under \"ABC's.\"      If you have further questions or things you'd like to discuss later in your pregnancy, I see clients at the [facility] on , the [facility] on , and the [facility] on  (call the scheduling line at [phone] to set up an appointment if you like--just say you'd like a prenatal lactation appointment).  I'm available both before and after baby comes!   I look forward to " talking with you!    PALOMA Curtis, CNM, IBCLC    _____________    Good breastfeeding resources:    Websites:  www.[employer]le Entertainment  www.LeBUZZ.PayNearMe/blog/  www.llli.org/breastfeeding-info/    Books:   The Womanly Art of Breastfeeding by [employer]  Breastfeeding Doesn't Need to Suck, by Rosa Eckert

## 2024-05-06 ENCOUNTER — OFFICE VISIT (OUTPATIENT)
Dept: MATERNAL FETAL MEDICINE | Facility: CLINIC | Age: 39
End: 2024-05-06
Attending: OBSTETRICS & GYNECOLOGY
Payer: COMMERCIAL

## 2024-05-06 ENCOUNTER — HOSPITAL ENCOUNTER (OUTPATIENT)
Dept: ULTRASOUND IMAGING | Facility: CLINIC | Age: 39
Discharge: HOME | End: 2024-05-06
Attending: OBSTETRICS & GYNECOLOGY
Payer: COMMERCIAL

## 2024-05-06 ENCOUNTER — LAB (OUTPATIENT)
Dept: LAB | Facility: CLINIC | Age: 39
End: 2024-05-06
Attending: OBSTETRICS & GYNECOLOGY
Payer: COMMERCIAL

## 2024-05-06 ENCOUNTER — MEDICAL CORRESPONDENCE (OUTPATIENT)
Dept: HEALTH INFORMATION MANAGEMENT | Facility: CLINIC | Age: 39
End: 2024-05-06

## 2024-05-06 DIAGNOSIS — O26.90 PREGNANCY RELATED CONDITION, ANTEPARTUM: ICD-10-CM

## 2024-05-06 DIAGNOSIS — O09.511 AMA (ADVANCED MATERNAL AGE) PRIMIGRAVIDA 35+, FIRST TRIMESTER: Primary | ICD-10-CM

## 2024-05-06 DIAGNOSIS — Z36.9 FIRST TRIMESTER SCREENING: ICD-10-CM

## 2024-05-06 DIAGNOSIS — O09.511 AMA (ADVANCED MATERNAL AGE) PRIMIGRAVIDA 35+, FIRST TRIMESTER: ICD-10-CM

## 2024-05-06 DIAGNOSIS — O09.519 ENCOUNTER FOR SUPERVISION OF PRIMIGRAVIDA OF ADVANCED MATERNAL AGE: ICD-10-CM

## 2024-05-06 LAB
ERYTHROCYTE [DISTWIDTH] IN BLOOD BY AUTOMATED COUNT: 14.2 % (ref 10–15)
HCT VFR BLD AUTO: 36.4 % (ref 35–47)
HGB BLD-MCNC: 12.3 G/DL (ref 11.7–15.7)
MCH RBC QN AUTO: 29.2 PG (ref 26.5–33)
MCHC RBC AUTO-ENTMCNC: 33.8 G/DL (ref 31.5–36.5)
MCV RBC AUTO: 87 FL (ref 78–100)
PLATELET # BLD AUTO: 216 10E3/UL (ref 150–450)
RBC # BLD AUTO: 4.21 10E6/UL (ref 3.8–5.2)
T PALLIDUM AB SER QL: NONREACTIVE
WBC # BLD AUTO: 12.6 10E3/UL (ref 4–11)

## 2024-05-06 PROCEDURE — 96040 HC GENETIC COUNSELING, EACH 30 MINUTES: CPT

## 2024-05-06 PROCEDURE — 76813 OB US NUCHAL MEAS 1 GEST: CPT | Mod: 26 | Performed by: OBSTETRICS & GYNECOLOGY

## 2024-05-06 PROCEDURE — 86780 TREPONEMA PALLIDUM: CPT

## 2024-05-06 PROCEDURE — 85027 COMPLETE CBC AUTOMATED: CPT

## 2024-05-06 PROCEDURE — 76813 OB US NUCHAL MEAS 1 GEST: CPT

## 2024-05-06 PROCEDURE — 36415 COLL VENOUS BLD VENIPUNCTURE: CPT

## 2024-05-06 NOTE — PROGRESS NOTES
Please refer to ultrasound report under 'Imaging' Studies of 'Chart Review' tabs.    Joby Saldaña M.D.

## 2024-05-06 NOTE — PROGRESS NOTES
[facility]  Genetic Counseling Consult    Patient:  Coty Doyle  Preferred Name: Coty YOB: 1985   Date of Service:  5/06/24   MRN: [id number]    Coty was seen at the [facility] for genetic consultation. The indication for genetic counseling is advanced maternal age. The patient was accompanied to this visit by their partner, Sadiq.    The session was conducted in English.      IMPRESSION/ PLAN   1. Coty has not had genetic screening in this pregnancy but elected to have screening today.     2. During today's [facility] visit, Coty had a blood draw for non-invasive prenatal testing (also called NIPT, NIPS, or cell-free DNA) through [facility] This NIPT screens for trisomy 21, 18, and 13 and the patient opted to screen for sex chromosome aneuploidies, including reported fetal sex. Microdeletion screening was discussed. Coty was unsure if her insurance would cover this and requested the CPT code to review with her insurance. The CPT code was provided along with my contact information. Coty will contact me by the end of this week if she would like that included on her NIPT. Results are expected in 7-10 days. The patient will be called with results and if they do not answer they requested a detailed message with results on their voicemail, NOT including the predicted fetal sex information. Instead, they would like the sex information available on [employer] for them to view when ready. Patient was informed that results, including fetal sex, will be available in [employer].    3. Since the patient chose aneuploidy screening via NIPT, quad screen is NOT recommended in the second trimester. If the patient desires screening for open neural tube defects, maternal serum AFP only is recommended, ideally between 16-18 weeks gestation.    4. Coty had a nuchal translucency ultrasound today. Please see the ultrasound report for  "further details.    5. Further recommendations include a fetal anatomy level II ultrasound with MFM. The upcoming ultrasound has been scheduled for 2024.    PREGNANCY HISTORY   /Parity:       This is first Coty's pregnancy.    CURRENT PREGNANCY   Current Age: 39 year old   Age at Delivery: 39 year old  SPEEDY: 2024, by Last Menstrual Period                                   Gestational Age: 12w2d  This pregnancy is a single gestation.   This pregnancy was conceived spontaneously.    MEDICAL HISTORY   Coty s reported medical history is not expected to impact pregnancy management or risks to fetal development.       FAMILY HISTORY   A three-generation pedigree was obtained today and is scanned under the \"Media\" tab in Epic. The family history was reported by Coty and their partner.    The were no significant findings reported in the family history today. Coty's partner, Sadiq, is currently 37 years old and healthy.     Otherwise, the reported family history is unremarkable for multiple miscarriages, stillbirths, birth defects, intellectual disabilities, known genetic conditions, and consanguinity.       RISK ASSESSMENT FOR INHERITED CONDITIONS AND CARRIER SCREENING OPTIONS   Expanded carrier screening is available to screen for autosomal recessive conditions and X-linked conditions in a large list of genes. Carrier screening does not test the pregnancy but gives a risk assessment for the pregnancy and future pregnancies to have the condition. Expanded carrier screening is designed to identify carrier status for conditions that are primarily childhood or adolescent onset. Expanded carrier screening does not evaluate for adult-onset conditions such as hereditary cancer syndromes, dementia/ Alzheimer's disease, or cardiovascular disease risk factors. Additionally, expanded carrier screening is not comprehensive for all known genetic diseases or inherited conditions. Carrier screening does not test for " all genetic and health conditions or risk factors.     Autosomal recessive conditions happen when a mutation has been inherited from the egg and sperm and include conditions like cystic fibrosis, thalassemia, hearing loss, spinal muscular atrophy, and more. We reviewed that when both biological parents carry a harmful genetic change in a gene associated with autosomal recessive inheritance, each of their pregnancies has a 1 in 4 (25%) chance to be affected by that condition. X-linked conditions happen when a mutation has been inherited from the egg and include conditions like fragile X syndrome.With x-linked conditions, the specific risk generally depends on the chromosomal sex of the fetus, with XY individuals (generally male) being most severely affected.     [place] screening was reviewed. About [place]  Screening    The patient had previous carrier screening for 445 conditions through [facility] in . A copy of the report was available for review today. The patient's partner did complete carrier screening as well.     Coty's partner Sadiq was a carrier for one condition: Usher Syndrome Type 2A: USH2A c.[id number] (p.[id number])    Coty screened positive for 5 conditions on the panel, noted below. Variants were transcribed as accurately as possible from a scanned genetic testing report:   -Meckel-Jaylon Syndrome, Type 1: MKS1 [id number]  -Metabolic Encephalopathy And Arrhythmias: TANGO2: exon 2-9 deletion  -POLG-Related Disorders: POLG c.1402A>G (p.W468D)  -Pseudoxanthoma Elasticum: ABCC6 c.3907G>C (p.Y4936K)  -Spinal Muscular Atrophy (SMA): SMN1 positive for one copy of SMN1, negative for SNP    RISK ASSESSMENT FOR CHROMOSOME CONDITIONS   We explained that the risk for fetal chromosome abnormalities increases with maternal age. We discussed specific features of common chromosome abnormalities, including trisomy 21 (Down syndrome), trisomy 13, trisomy 18, and sex chromosome trisomies.    At  age 39 at midtrimester, the risk to have a baby with Down syndrome is 1 in 129.   At age 39 at midtrimester, the risk to have a baby with any chromosome abnormality is 1 in 65.   At age 39 at delivery, the risk to have a baby with Down syndrome is 1 in 175.   At age 39 at delivery, the risk to have a baby with any chromosome abnormality is 1 in 103.     Coty has not had genetic screening in this pregnancy but elected to have screening today.      GENETIC TESTING OPTIONS   Genetic testing during a pregnancy includes screening and diagnostic procedures.      Screening tests are non-invasive which means no risk to the pregnancy and includes ultrasounds and blood work. The benefits and limitations of screening were reviewed. Screening tests provide a risk assessment (chance) specific to the pregnancy for certain fetal chromosome abnormalities but cannot definitively diagnose or exclude a fetal chromosome abnormality. Follow-up genetic counseling and consideration of diagnostic testing is recommended with any abnormal screening result. Diagnostic testing during a pregnancy is more certain and can test for more conditions. However, the tests do have a risk of miscarriage that requires careful consideration. These tests can detect fetal chromosome abnormalities with greater than 99% certainty. Results can be compromised by maternal cell contamination or mosaicism and are limited by the resolution of current genetic testing technology.     There is no screening or diagnostic test that detects all forms of birth defects or intellectual disability.     We discussed the following screening options:     Non-invasive prenatal testing (NIPT)  Also called cell-free DNA screening because it detects chromosomes from the placenta in the pregnant person's blood  Can be done any time after 10 weeks gestation  Standard recommendation for NIPT screens for trisomy 21, trisomy 18, trisomy 13, with the option of adding sex chromosome  aneuploidies, without or without predicted sex  Cannot screen for open neural tube defects, maternal serum AFP after 15 weeks is recommended  New NIPT options include screening for other trisomies, microdeletion syndromes, and in some cases fetal blood antigens. Guidelines do not recommend these conditions are included in standard screening. These options have limitations and should be discussed with a genetic counselor.   However, current (2023) ACMG guidelines do recommend that screening for one microdeletion syndrome, called 22q11.2 deletion syndrome be offered to all pregnant patients. 22q11.2 deletion syndrome has an estimated prevalence of 1 in 990 to 1 in 2148 (0.05-0.1%). Risk is not thought to increase with maternal age. Clinical features are variable but include congenital heart defects, cleft palate, developmental delays, immune system deficiencies, and hearing loss. Approximately 90% of cases are de ventura (a sporadic new change in a pregnancy). Cell-free DNA screening for 22q11.2 deletion syndrome is available with the inclusion of other microdeletion syndromes. There is less data about the performance of cell-free DNA screening for more rare microdeletions and the chance for false positives or negative may be increased.  We discussed the limitations of cell-free DNA screening in detecting microdeletions and the possiblity of false positives and false negatives. Coty was unsure if her insurance would cover this and requested the CPT code to review with her insurance. The CPT code was provided along with my contact information. Coty will contact me by the end of this week if she would like that included on her NIPT.     We discussed the following ultrasound options:    Nuchal translucency (NT) ultrasound  Ultrasound between [id number]-63s5b that includes nuchal translucency measurement and nasal bone assessments  Nuchal translucency refers to the space at the back of the neck where fluid builds up. All babies at  this stage have fluid and there is only concern if there is too much fluid  Nasal bone refers to the small bone in the nose. There is concern for conditions like Down syndrome if the bone cannot be seen at all  This ultrasound can be done as part of first trimester screening, at the same time as another screen (NIPT), at the same time as a CVS, or if the patients does not want genetic screening.  Markers on ultrasound detects about 70% of pregnancies with aneuploidy  Abnormalities on NT ultrasound can also increase the risk for a birth defect, like a heart defect    Comprehensive level II ultrasound (Fetal Anatomy Ultrasound)  Ultrasound done between 18-20 weeks gestation  Screens for major birth defects and markers for aneuploidy (like trisomy 21 and trisomy 18)  Includes looking at the fetus/baby's growth, heart, organs (stomach, kidneys), placenta, and amniotic fluid    We discussed the following diagnostic options:     Chorionic villus sampling (CVS)  Invasive diagnostic procedure done between 10w0d and 13w6d  The procedure collects a small sample from the placenta for the purpose of chromosomal testing and/or other genetic testing  Diagnostic result; more than 99% sensitivity for fetal chromosome abnormalities  Cannot screen for open neural tube defects, maternal serum AFP after 15 weeks is recommended    Amniocentesis  Invasive diagnostic procedure done after 15 weeks gestation  The procedure collects a small sample of amniotic fluid for the purpose of chromosomal testing and/or other genetic testing  Diagnostic result; more than 99% sensitivity for fetal chromosome abnormalities  Testing for AFP in the amniotic fluid can test for open neural tube defects    It was a pleasure to be involved with Coty s care. Face-to-face time of the meeting was 45 minutes.    Asia Godfrey GC, MS, [facility]  Board Certified and [place] Licensed Genetic Counselor  [facility]  Maternal Fetal Medicine  Office:  [phone]  [facility]: [phone]   Fax: [phone]  [facility]

## 2024-05-13 ENCOUNTER — TELEPHONE (OUTPATIENT)
Dept: MATERNAL FETAL MEDICINE | Facility: CLINIC | Age: 39
End: 2024-05-13
Payer: COMMERCIAL

## 2024-05-13 ENCOUNTER — VIRTUAL VISIT (OUTPATIENT)
Dept: PSYCHIATRY | Facility: CLINIC | Age: 39
End: 2024-05-13
Payer: COMMERCIAL

## 2024-05-13 ENCOUNTER — TRANSFERRED RECORDS (OUTPATIENT)
Dept: HEALTH INFORMATION MANAGEMENT | Facility: CLINIC | Age: 39
End: 2024-05-13
Payer: COMMERCIAL

## 2024-05-13 VITALS — HEIGHT: 71 IN | BODY MASS INDEX: 30.1 KG/M2 | WEIGHT: 215 LBS

## 2024-05-13 DIAGNOSIS — F41.1 GAD (GENERALIZED ANXIETY DISORDER): Primary | ICD-10-CM

## 2024-05-13 DIAGNOSIS — F33.41 MAJOR DEPRESSIVE DISORDER, RECURRENT EPISODE, IN PARTIAL REMISSION (H): ICD-10-CM

## 2024-05-13 LAB — SCANNED LAB RESULT: NORMAL

## 2024-05-13 PROCEDURE — 99214 OFFICE O/P EST MOD 30 MIN: CPT | Mod: 95 | Performed by: NURSE PRACTITIONER

## 2024-05-13 ASSESSMENT — PAIN SCALES - GENERAL: PAINLEVEL: NO PAIN (0)

## 2024-05-13 NOTE — TELEPHONE ENCOUNTER
May 13, 2024     I spoke with Coty Doyle regarding her NIPT results.      Results indicate NO ANEUPLOIDY DETECTED for chromosomes 21, 18, 13, or the sex chromosomes. Per patient request, predicted fetal sex was not disclosed over the phone and is available on meetst for her and her partner to view when ready.      This puts her current pregnancy at low risk for Down syndrome, trisomy 18, trisomy 13, and sex chromosome abnormalities. This test is reported to have the following sensitivities: Down syndrome- >99.7%, trisomy 18- >97.9%, and trisomy 13- >99.0%. Although these results are reassuring, this does not replace a standard chromosome analysis from a chorionic villus sampling or amniocentesis.      MSAFP is the appropriate second trimester screening test for open neural tube defects; the maternal quad screen is not recommended.     Her results are available in her Epic chart for her primary OB to review.     Asia Godfrey MS, [facility]  Certified and Licensed Genetic Counselor  [facility]  Maternal Fetal Medicine  Office: [phone]  [facility]: [phone]   Fax: [phone]  [facility]

## 2024-05-13 NOTE — PROGRESS NOTES
"Virtual Visit Details    Type of service:  Video Visit     Originating Location (pt. Location): {video visit patient location:410125::\"Home\"}  {PROVIDER LOCATION On-site should be selected for visits conducted from your clinic location or adjoining [facility] hospital, academic office, or other nearby [facility] building. Off-site should be selected for all other provider locations, including home:759935}  Distant Location (provider location):  {virtual location provider:962015}  Platform used for Video Visit: {Virtual Visit Platforms:531717::\"Fiberstar\"}  "

## 2024-05-13 NOTE — NURSING NOTE
Care team has reviewed attendance agreement with patient. Patient advised that two failed appointments within 6 months may lead to termination of current episode of care.      Lisa Amaya VF  5/13/2024

## 2024-05-13 NOTE — NURSING NOTE
[place]? YES    Visit mode:VIDEO    If the visit is dropped, the patient can be reconnected by: VIDEO VISIT: Text to cell phone:   Telephone Information:   Mobile [phone]       Will anyone else be joining the visit? NO  (If patient encounters technical issues they should call [phone])    How would you like to obtain your AVS? MyChart    Are changes needed to the allergy or medication list? No    Are refills needed on medications prescribed by this physician? NO    Reason for visit: RECHECK    [facility] with patient. Patient advised that two failed appointments within 6 months may lead to termination of current episode of care.

## 2024-05-13 NOTE — PROGRESS NOTES
"Virtual Visit Details     Type of service:  Video Visit     Originating Location (pt. Location): Home    Distant Location (provider location):  Off-site  Platform used for Video Visit: [facility]       OUTPATIENT PSYCHIATRIC FOLLOW-UP      2024    Provider: PALOMA Cheek CNP      Appointment Start Time: 219 pm  Appointment End Time: 245pm     Name: Coty Doyle   : 1985                    Preferred Name: Coty      Screening Tools           3/29/2024     2:11 PM 3/12/2024     2:47 PM 2024     2:21 PM   PHQ   PHQ-9 Total Score 2 2 3   Q9: Thoughts of better off dead/self-harm past 2 weeks Not at all Not at all Not at all         3/12/2024     2:47 PM 2024     2:21 PM 2023     9:29 AM   BALBINA-7 SCORE   Total Score 2 (minimal anxiety) 5 (mild anxiety)    Total Score 2 5 8     PROMIS 10-Global Health (only subscores and total score):       3/25/2024    11:24 AM   PROMIS-10 Scores Only   Global Mental Health Score 13   Global Physical Health Score 16   PROMIS TOTAL - SUBSCORES 29          History of Present Illness      Patient attended the session alone.     Interim History:  I last saw Coty Doyle for outpatient psychiatry Consultation on 3/29/24. During that appointment, we established care, continued plan .     Current stressors include:  pregnancy      Coping mechanisms and supports include: Family and Friends    Side effects: Denies    Medication adherence: Reports good med adherence.    Psychiatric Review of Systems      Coty Doyle reports mood has been: \"good\"    - Exploring breast feeding, learning.     Depression has been:   - Weight fluctuations.   - Mood has been stable.     Anxiety has been:   - Anxiety reduced as pregnancy continues.   - Overwhelmed with logistics of pregnancy     Sleep has been:   - Slight disruptions with sleep.   - Waking for bathroom.   - Sleeping 9-10 hours.     Louann sxs: Denies    Psychosis sxs: Denies    ADHD sxs: " "Denies    PTSD sxs: Denies    SI/SIB: Denies SI/SIB/HI      Medications Prior to Appointment       Current Outpatient Medications   Medication Sig Dispense Refill    sertraline (ZOLOFT) 50 MG tablet Take 1 tablet (50 mg) by mouth daily 90 tablet 3    albuterol (PROAIR HFA/PROVENTIL HFA/VENTOLIN HFA) 108 (90 BASE) MCG/ACT Inhaler Inhale 2 puffs into the lungs as needed for shortness of breath or wheezing      Iron-Vitamin C (IRON 100/C) 100-250 MG TABS       Prenatal Vit-DSS-Fe Cbn-FA (PRENATAL AD PO)       Vitamin D, Cholecalciferol, 25 MCG (1000 UT) CAPS             Previous medication trials include but not limited to:  - sertraline                  Medication Compliance: Yes                 Pharmacogenomic Testing Completed: No      Medical History      History of head injuries: No  History of seizures: No  History of cardiac events: No  History of Tardive Dyskinesia: No      Past Medical History:   Diagnosis Date    CARDIOVASCULAR SCREENING; LDL GOAL LESS THAN 160 04/17/2012    Contraceptive management 04/18/2012    History of COVID-19     Jaw pain 12/31/2021    Nonintractable episodic headache, unspecified headache type 01/01/2022    Plantar warts 07/06/2012    Uncomplicated asthma         Surgery:   Past Surgical History:   Procedure Laterality Date    MANDIBLE SURGERY  08/2004    ORTHOPEDIC SURGERY  2004    Jaw surgery    PE TUBES      as child.     Primary Care Provider: PEDRO MIRELES MD        Social History      Current Living situation:  [place], [place] with Spouse/Partner.    Current use of drugs or alcohol: Denies     Tobacco use: No    Employment: Yes: [employer] employed full time    Relationship Status:      Vitals      Not obtained d/t virtual visit.     Ht 1.791 m (5' 10.5\")   Wt 97.5 kg (215 lb)   LMP 02/10/2024   BMI 30.41 kg/m      Labs        Most recent laboratory results reviewed and pertinent results include:   Lab on 05/06/2024   Component Date Value Ref Range Status    WBC Count " 05/06/2024 12.6 (H)  4.0 - 11.0 10e3/uL Final    RBC Count 05/06/2024 4.21  3.80 - 5.20 10e6/uL Final    Hemoglobin 05/06/2024 12.3  11.7 - 15.7 g/dL Final    Hematocrit 05/06/2024 36.4  35.0 - 47.0 % Final    MCV 05/06/2024 87  78 - 100 fL Final    MCH 05/06/2024 29.2  26.5 - 33.0 pg Final    MCHC 05/06/2024 33.8  31.5 - 36.5 g/dL Final    RDW 05/06/2024 14.2  10.0 - 15.0 % Final    Platelet Count 05/06/2024 216  150 - 450 10e3/uL Final    Treponema Antibody Total 05/06/2024 Nonreactive  Nonreactive Final    See Scanned Result 05/06/2024 MYRIAD NON-INVASIVE PRENATAL SCREENING PREQUEL-Scanned   Final   Prenatal Office Visit on 04/25/2024   Component Date Value Ref Range Status    Culture 04/25/2024 <10,000 CFU/mL Mixture of urogenital amalia   Final    Color Urine 04/25/2024 Yellow  Colorless, Straw, Light Yellow, Yellow Final    Appearance Urine 04/25/2024 Clear  Clear Final    Glucose Urine 04/25/2024 Negative  Negative mg/dL Final    Bilirubin Urine 04/25/2024 Negative  Negative Final    Ketones Urine 04/25/2024 Negative  Negative mg/dL Final    Specific Gravity Urine 04/25/2024 >=1.030  1.003 - 1.035 Final    Blood Urine 04/25/2024 Negative  Negative Final    pH Urine 04/25/2024 6.0  5.0 - 7.0 Final    Protein Albumin Urine 04/25/2024 Negative  Negative mg/dL Final    Urobilinogen Urine 04/25/2024 0.2  0.2, 1.0 E.U./dL Final    Nitrite Urine 04/25/2024 Negative  Negative Final    Leukocyte Esterase Urine 04/25/2024 Negative  Negative Final   Lab on 04/14/2024   Component Date Value Ref Range Status    hCG Quantitative 04/14/2024 82,972 (H)  <5 mIU/mL Final    Adult: 0-5 mIU/mL for healthy non-pregnant person  Neonates: Should be within normal ranges by 2 days after birth     Virtual Visit on 03/28/2024   Component Date Value Ref Range Status    Hepatitis C Antibody (External) 01/23/2024 Nonreactive  Nonreactive Final    ABO (External) 01/23/2024 A   Final    Rh (External) 01/23/2024 POSITIVE   Final    HIV 1&2  Antibody (External) 01/23/2024 Nonreactive  Nonreactive Final    Rubella Antibody IgG (External) 01/23/2024 Nonreactive  Nonreactive Final   My[facility] Medical Advice on 03/28/2024   Component Date Value Ref Range Status    TSH (External) 01/23/2024 1.67  0.270 - 4.2 mU/L Final    Rubella Antibody IgG (External) 01/23/2024 Reactive  Nonreactive Final    80.02    Hemoglobin A1C (External) 01/23/2024 5.4  4 - 5.7 % Final    HIV 1/2 Agn Khushi 4th Gen With Reflex 01/23/2024 Non-reactive   Final    ABO (External) 01/23/2024 A   Final    Rh (External) 01/23/2024 POSITIVE   Final    Vitamin D 25-OH Total 01/23/2024 23 (A)  29 - 30 Final    Varicella Zoster Antibody IgG (Ins* 01/23/2024 2,304.00   Final    Hepatitis C Antibody (External) 01/23/2024 Nonreactive  Nonreactive Final    Hepatitis B Surface Antigen (Exter* 01/23/2024 Nonreactive  Nonreactive Final    RBC Antibody Screen 01/23/2024 None detected   Final    Prolactin 01/23/2024 18.19  4.79 - 23.3 mcg/L Final   Lab on 03/21/2024   Component Date Value Ref Range Status    Influenza A PCR 03/21/2024 Negative  Negative Final    Influenza B PCR 03/21/2024 Negative  Negative Final    RSV PCR 03/21/2024 Negative  Negative Final    SARS CoV2 PCR 03/21/2024 Negative  Negative Final    NEGATIVE: SARS-CoV-2 (COVID-19) RNA not detected, presumed negative.   Lab on 12/21/2023   Component Date Value Ref Range Status    Estradiol 12/21/2023 14  pg/mL Final    Healthy Men:   11.3-43.2 pg/mL    Healthy Postmenopausal Women:  Postmenopause: <5-138 pg/mL    Healthy Pregnant Women:  1st trimester: 154-3243 pg/mL  2nd trimester: 1561-32235 pg/mL  3rd trimester: 8525->81814 pg/mL    Healthy Women Cycle Phase:  Follicular: 30.9-90.4 pg/mL  Ovulation: 60.4-533 pg/mL  Luteal: 60.4-232 pg/mL    Healthy Women Cycle Sub-Phase:  Early Follicular: 20.5-62.8 pg/mL  Intermediate Follicular: 26-79.8 pg/mL  Late Follicular: 49.5-233 pg/mL  Ovulation: 60.4-602 pg/mL  Early Luteal: 51.1-179  "pg/mL  Intermediate Luteal: 66.5-305 pg/mL  Late Luteal: 30.2-222 pg/mL    Luteinizing Hormone 12/21/2023 5.2  mIU/mL Final    FEMALE:  Age  0 - 6 mo:  <0.1-8.2 mIU/mL  6 mo - 11 years: <0.1-1.3 mIU/mL   11 - 14 years: <0.1-10 mIU/mL   14 - 19 years: 0.4-25 mIU/mL     19 years and older:   Follicular Phase: 2.4-12.6 mIU/mL  Ovulation Phase: 14.0-95.6 mIU/mL  Luteal Phase: 1.0-11.4  mIU/mL  Postmenopausal: 7.7-58.5 mIU/mL      FSH 12/21/2023 8.6  mIU/mL Final    19 years and older:   Follicular phase: 3.5-12.5 mIU/mL   Ovulation phase: 4.7-21.5 mIU/mL   Luteal phase: 1.7-7.7 mIU/mL   Postmenopause: 25.8-134.8 mIU/mL      Office Visit on 12/12/2023   Component Date Value Ref Range Status    Cholesterol 12/12/2023 249 (H)  <200 mg/dL Final    Triglycerides 12/12/2023 111  <150 mg/dL Final    Direct Measure HDL 12/12/2023 47 (L)  >=50 mg/dL Final    LDL Cholesterol Calculated 12/12/2023 180 (H)  <=100 mg/dL Final    Non HDL Cholesterol 12/12/2023 202 (H)  <130 mg/dL Final    Patient Fasting > 8hrs? 12/12/2023 Yes   Final    Lead Venous Blood 12/12/2023 <2.0  <=4.9 ug/dL Final    Comment: INTERPRETIVE INFORMATION: Lead, Blood (Venous)    Analysis performed by Inductively Coupled Plasma-Mass   Spectrometry (ICP-MS).    Elevated results may be due to skin or collection-related   contamination, including the use of a noncertified   lead-free tube. If contamination concerns exist due to   elevated levels of blood lead, confirmation with a second   specimen collected in a certified lead-free tube is   recommended.    Information sources for blood lead reference intervals and   interpretive comments include the CDC's \"Childhood Lead   Poisoning Prevention: Recommended Actions Based on Blood   Lead Level\" and the \"Adult Blood Lead Epidemiology and   Surveillance: Reference Blood Lead Levels (BLLs) for Adults   in the [place].\" Thresholds and time intervals for retesting,   medical evaluation, and response vary by state and "   regulatory body. Contact your [facility] Department of Health   and/or applicable regulatory agency for specific guidance   on medical management                            recommendations.    This test was developed and its performance characteristics   determined by [employer]. It has not been cleared or   approved by the U.S. Food and Drug Administration. This   test was performed in a CLIA-certified laboratory and is   intended for clinical purposes.         Group          Concentration   Comment    Children       3.5-19.9 ug/dL  Children under the age of 6                                 years are the most vulnerable                                 to the harmful effects of                                  lead exposure. Environmental                                  investigation and exposure                                  history to identify potential                                 sources of lead. Biological                                  and nutritional monitoring                                 are recommended. Follow-up                                  blood lead monitoring is                                  recommended.                                                           20-44.9 ug/dL   Lead hazard reduction and                                  prompt medical evaluation are                                 recommended. Contact a                                  Pediatric Environmental                                  Health Specialty Unit or                                  poison control center for                                  guidance.                   Greater than    Critical. Immediate medical                  44.9 ug/dL      evaluation, including                                  detailed neurological exam is                                 recommended. Consider                                  chelation therapy when                                 symptoms of lead toxicity   are                                   present. Contact a Pediatric                                  Environmental Health                                  Specialty Unit or poison                                  control center for                                                             assistance.    Adult          5-19.9 ug/dL    Medical removal is                                  recommended for pregnant                                  women or those who are trying                                 or may become pregnant.                                  Adverse health effects are                                  possible. Reduced lead                                  exposure and increased blood                                  lead monitoring are                                  recommended.                    20-69.9 ug/dL   Adverse health effects are                                  indicated. Medical removal                                  from lead exposure is                                  required by OSHA if blood                                  lead level exceeds 50 ug/dL.                                 Prompt medical evaluation is                                 recommended.                    Greater than    Critical. Immediate medical                                             69.9 ug/dL      evaluation is recommended.                                  Consider chelation therapy                                 when symptoms of lead                                  toxicity are present.  Performed By: [email or web address]  [address] [id number]  : Teodoro Medina MD, PhD  CLIA Number: [id number]    Prolactin 12/12/2023 21  5 - 23 ng/mL Final    Anti-Mullerian Hormone 12/12/2023 0.168  0.150 - 7.500 ng/mL Final   Lab on 09/01/2023   Component Date Value Ref Range Status    Campylobacter species 09/01/2023 Negative  Negative Final    Salmonella species 09/01/2023  Negative  Negative Final    Vibrio species 09/01/2023 Negative  Negative Final    Vibrio cholerae 09/01/2023 Negative  Negative Final    Yersinia enterocolitica 09/01/2023 Negative  Negative Final    Enteropathogenic E. coli (EPEC) 09/01/2023 Negative  Negative, NA Final    Shiga-like toxin-producing E. coli* 09/01/2023 Negative  Negative Final    Shigella/Enteroinvasive E. coli (E* 09/01/2023 Negative  Negative Final    Cryptosporidium species 09/01/2023 Negative  Negative Final    Giardia lamblia 09/01/2023 Negative  Negative Final    Norovirus Gl/Gll 09/01/2023 Negative  Negative Final    Rotavirus A 09/01/2023 Negative  Negative Final    Plesiomonas shigelloides 09/01/2023 Negative  Negative Final    Enteroaggregative E. coli (EAEC) 09/01/2023 Negative  Negative Final    Enterotoxigenic E. coli (ETEC) 09/01/2023 Negative  Negative Final    E. coli O157 09/01/2023 NA  Negative, NA Final    Cyclospora cayetanensis 09/01/2023 Negative  Negative Final    Entamoeba histolytica 09/01/2023 Negative  Negative Final    Adenovirus F40/41 09/01/2023 Negative  Negative Final    Astrovirus 09/01/2023 Negative  Negative Final    Sapovirus 09/01/2023 Negative  Negative Final   Office Visit on 06/05/2023   Component Date Value Ref Range Status    TSH 06/05/2023 1.42  0.30 - 4.20 uIU/mL Final    Ferritin 06/05/2023 41  6 - 175 ng/mL Final    Sodium 06/05/2023 138  136 - 145 mmol/L Final    Potassium 06/05/2023 4.6  3.4 - 5.3 mmol/L Final    Chloride 06/05/2023 103  98 - 107 mmol/L Final    Carbon Dioxide (CO2) 06/05/2023 24  22 - 29 mmol/L Final    Anion Gap 06/05/2023 11  7 - 15 mmol/L Final    Urea Nitrogen 06/05/2023 10.1  6.0 - 20.0 mg/dL Final    Creatinine 06/05/2023 0.88  0.51 - 0.95 mg/dL Final    Calcium 06/05/2023 9.3  8.6 - 10.0 mg/dL Final    Glucose 06/05/2023 91  70 - 99 mg/dL Final    Alkaline Phosphatase 06/05/2023 37  35 - 104 U/L Final    AST 06/05/2023 21  10 - 35 U/L Final    ALT 06/05/2023 11  10 - 35 U/L  Final    Protein Total 06/05/2023 7.1  6.4 - 8.3 g/dL Final    Albumin 06/05/2023 4.3  3.5 - 5.2 g/dL Final    Bilirubin Total 06/05/2023 0.3  <=1.2 mg/dL Final    GFR Estimate 06/05/2023 86  >60 mL/min/1.73m2 Final    eGFR calculated using 2021 CKD-EPI equation.    Magnesium 06/05/2023 2.0  1.7 - 2.3 mg/dL Final    WBC Count 06/05/2023 7.9  4.0 - 11.0 10e3/uL Final    RBC Count 06/05/2023 4.63  3.80 - 5.20 10e6/uL Final    Hemoglobin 06/05/2023 13.1  11.7 - 15.7 g/dL Final    Hematocrit 06/05/2023 41.4  35.0 - 47.0 % Final    MCV 06/05/2023 89  78 - 100 fL Final    MCH 06/05/2023 28.3  26.5 - 33.0 pg Final    MCHC 06/05/2023 31.6  31.5 - 36.5 g/dL Final    RDW 06/05/2023 14.6  10.0 - 15.0 % Final    Platelet Count 06/05/2023 208  150 - 450 10e3/uL Final    % Neutrophils 06/05/2023 70  % Final    % Lymphocytes 06/05/2023 19  % Final    % Monocytes 06/05/2023 7  % Final    % Eosinophils 06/05/2023 3  % Final    % Basophils 06/05/2023 1  % Final    % Immature Granulocytes 06/05/2023 0  % Final    NRBCs per 100 WBC 06/05/2023 0  <1 /100 Final    Absolute Neutrophils 06/05/2023 5.5  1.6 - 8.3 10e3/uL Final    Absolute Lymphocytes 06/05/2023 1.5  0.8 - 5.3 10e3/uL Final    Absolute Monocytes 06/05/2023 0.6  0.0 - 1.3 10e3/uL Final    Absolute Eosinophils 06/05/2023 0.3  0.0 - 0.7 10e3/uL Final    Absolute Basophils 06/05/2023 0.1  0.0 - 0.2 10e3/uL Final    Absolute Immature Granulocytes 06/05/2023 0.0  <=0.4 10e3/uL Final    Absolute NRBCs 06/05/2023 0.0  10e3/uL Final     No EKG on file.       Medical Review of Systems      Pertinent positives noted in HPI and below:   Review of Systems   All other systems reviewed and are negative.       Contraception: None Patient's last menstrual period was 02/10/2024.  Pregnancy status: Pregnant: 6/7 weeks gestation    Mental Status Exam      Appearance: awake, alert, adequately groomed, appeared stated age and no apparent distress  Attitude:  cooperative   Eye Contact:  good  Gait  "and Station: normal, no gross abnormalities noted by observation  Psychomotor Behavior:  no evidence of tardive dyskinesia, dystonia, or tics  Oriented to:  person, place, time, and situation  Attention Span and Concentration:  normal  Speech:  clear, coherent, regular rate, rhythm, and volume  Language: intact  Mood: \"Pretty good\"  Affect:  appropriate and in normal range  Associations:  no loose associations  Thought Process:  logical, linear and goal oriented  Thought Content:  no evidence of suicidal ideation or homicidal ideation, no evidence of psychotic thought, no auditory hallucinations present and no visual hallucinations present  Recent and Remote Memory:  Intact to interview. Not formally assessed. No amnesia.  Fund of Knowledge: appropriate  Insight: Partial to full  Judgment:  intact, adequate for safety  Impulse Control:  intact            Risk Assessment       Updated: 5/13/2024    Suicide assessment  Acute: Low  Chronic:Low  Imminent: Low     Risk factors  History of suicide attempts: No  History of self-injurious behavior: No  Beau. Axis I psychiatric diagnoses: Yes  Substance use disorder: No  Symptoms:  feeling inadequate  Family history of completed suicide or attempted suicide: No  Accessibility to firearms: No  Interpersonal factors: Pregnancy     Protective factors  Ability to cope with the stress: Yes  Family responsibility and supportive:Yes  Positive therapeutic relationships: Yes  Social support:Yes  Mandaen beliefs:  No  Connectedness with mental health providers: Yes     Homicidal Risk  Acute: Low  Chronic: Low  Imminent: Low    Assessment     Coty Doyle is 39 year old female with a past psychiatry history of depression and anxiety who has been referred for medication management from primary care provider.  No previous psychiatric inpatient hospitalizations.  Denies history of suicidal ideation.  Denies history of suicide attempts.  Denies history of self harming behaviors.  " No overt concerns regarding chemical health history.  Did experience difficult childhood.     Coty Doyle reports improvement in anxiety as pregnancy progresses. Continues to take pregnancy in stride and working at breaking learning into phases to reduce sense of being overwhelmed. No imminent safety concerns. Continue to process and develop planning for birth and reviewing post partum symptoms.         Pharmacologic:   -Continue sertraline 50 mg by mouth every bedtime        Psychosocial: Would benefit from individual therapy with focus of Dialectical Behavior Therapy (DBT). DBT would be helpful in addressing emotional regulation, distress tolerance, mindfulness, and interpersonal effectiveness.  Continue group therapy through mental health systems             Diagnosis       Generalized anxiety disorder  Major depressive disorder, recurrent, in partial to full remission     Plan         1) Medications:   -Sertraline 50 mg by mouth every bedtime              MNPMP: I have queried the [place] and/or [place] Prescription Monitoring Program for this patient for the preceding 12 months, or reviewed the report provided by my proxy delegate. I have not identified any concerns.  2) Risk vs benefits of medications reviewed: Yes  3) Life style modifications: sleep hygiene, exercise, healthy diet  4) Medical concerns:   -Pregnancy, second trimester     5) Others   -Continue group therapy  -Develop postpartum plan in the future (resource review, etc)  6) Refrain from drinking alcohol and/or use of drugs.   7) Please secure all prescription and OTC medications, sharps, and caustic substances. Please remove all firearms and ammunition.  8) Review outside records, get JUSTINO's, coordinate with outside providers     9) In case of emergency call 911 or go to the nearest ER, this includes patient voicing thought of harming self or others as well as additional safety concerns   10) Follow-up: 6-8 weeks, or sooner if  needed.    Administrative Billing:   Supportive therapy was provided, focusing on reflective listening and solution focused problem solving.    Total time preparing to see this patient, face-to-face time, documenting in the EHR, and coordinating care time on the same calendar date: 34 minutes         Signed:   PALOMA Cheek CNP on 5/14/2024 at 8:35 AM     Disclaimer: This note consists of symbols derived from keyboarding, dictation and/or voice recognition software. As a result, there may be errors in the script that have gone undetected. Please consider this when interpreting information found in this chart.

## 2024-05-14 NOTE — PATIENT INSTRUCTIONS
"Patient Education   The Panel Psychiatry Program  What to Expect  Here's what to expect in the Panel Psychiatry Program.   About the program  You'll be meeting with a psychiatric doctor to check your mental health. A psychiatric doctor helps you deal with troubling thoughts and feelings by giving you medicine. They'll make sure you know the plan for your care. You may see them for a long time. When you're feeling better, they may refer you back to seeing your family doctor.   If you have any questions, we'll be glad to talk to you.  About visits  Be open  At your visits, please talk openly about your problems. It may feel hard, but it's the best way for us to help you.  Cancelling visits  If you can't come to your visit, please call us right away at [phone]. If you don't cancel at least 24 hours (1 full day) before your visit, that's \"late cancellation.\"  Not showing up for your visits  Being very late is the same as not showing up. You'll be a \"no show\" if:  You're more than 15 minutes late for a 30-minute (half hour) visit.  You're more than 30 minutes late for a 60-minute (full hour) visit.  If you cancel late or don't show up 2 times within 6 months, we may end your care.  Getting help between visits  If you need help between visits, you can call us Monday to Friday from 8 a.m. to 4:30 p.m. at [phone].  Emergency care  Call 911 or go to the nearest emergency department if your life or someone else's life is in danger.  Call 988 anytime to reach the national Suicide and Crisis hotline.  Medicine refills  To refill your medicine, call your pharmacy. You can also call [facility]'s Behavioral Access at [phone], Monday to Friday, 8 a.m. to 4:30 p.m. It can take 1 to 3 business days to get a refill.   Forms, letters, and tests  You may have papers to fill out, like FMLA, short-term disability, and workability. We can help you with these forms at your visits, but you must have an " appointment. You may need more than 1 visit for this, to be in an intensive therapy program, or both.  Before we can give you medicine for ADHD, we may refer you to get tested for it or confirm it another way.  We may not be able to give you an emotional support animal letter.  We don't do mental health checks ordered by the court.   We don't do mental health testing, but we can refer you to get tested.   Thank you for choosing us for your care.  For informational purposes only. Not to replace the advice of your health care provider. Copyright   2022 [facility]. All rights reserved. [employer] [id number] - 12/22.

## 2024-05-24 ENCOUNTER — MYC MEDICAL ADVICE (OUTPATIENT)
Dept: OBGYN | Facility: CLINIC | Age: 39
End: 2024-05-24

## 2024-05-24 DIAGNOSIS — R30.0 DYSURIA: Primary | ICD-10-CM

## 2024-05-25 ENCOUNTER — LAB (OUTPATIENT)
Dept: LAB | Facility: CLINIC | Age: 39
End: 2024-05-25
Payer: COMMERCIAL

## 2024-05-25 DIAGNOSIS — R30.0 DYSURIA: ICD-10-CM

## 2024-05-25 LAB
ALBUMIN UR-MCNC: NEGATIVE MG/DL
APPEARANCE UR: CLEAR
BACTERIA #/AREA URNS HPF: ABNORMAL /HPF
BILIRUB UR QL STRIP: NEGATIVE
COLOR UR AUTO: YELLOW
GLUCOSE UR STRIP-MCNC: NEGATIVE MG/DL
HGB UR QL STRIP: NEGATIVE
KETONES UR STRIP-MCNC: NEGATIVE MG/DL
LEUKOCYTE ESTERASE UR QL STRIP: ABNORMAL
NITRATE UR QL: NEGATIVE
PH UR STRIP: 5.5 [PH] (ref 5–7)
RBC #/AREA URNS AUTO: ABNORMAL /HPF
SP GR UR STRIP: <=1.005 (ref 1–1.03)
UROBILINOGEN UR STRIP-ACNC: 0.2 E.U./DL
WBC #/AREA URNS AUTO: ABNORMAL /HPF

## 2024-05-25 PROCEDURE — 87088 URINE BACTERIA CULTURE: CPT

## 2024-05-25 PROCEDURE — 87086 URINE CULTURE/COLONY COUNT: CPT

## 2024-05-25 PROCEDURE — 81001 URINALYSIS AUTO W/SCOPE: CPT

## 2024-05-28 LAB
BACTERIA UR CULT: ABNORMAL
BACTERIA UR CULT: ABNORMAL

## 2024-05-29 ENCOUNTER — PRENATAL OFFICE VISIT (OUTPATIENT)
Dept: OBGYN | Facility: CLINIC | Age: 39
End: 2024-05-29
Payer: COMMERCIAL

## 2024-05-29 VITALS
HEART RATE: 96 BPM | OXYGEN SATURATION: 96 % | DIASTOLIC BLOOD PRESSURE: 74 MMHG | WEIGHT: 221 LBS | SYSTOLIC BLOOD PRESSURE: 105 MMHG | BODY MASS INDEX: 31.26 KG/M2

## 2024-05-29 DIAGNOSIS — O09.519 SUPERVISION OF PRIMIGRAVIDA OF ADVANCED MATERNAL AGE, ANTEPARTUM: Primary | ICD-10-CM

## 2024-05-29 LAB — T PALLIDUM AB SER QL: NONREACTIVE

## 2024-05-29 PROCEDURE — 36415 COLL VENOUS BLD VENIPUNCTURE: CPT | Performed by: OBSTETRICS & GYNECOLOGY

## 2024-05-29 PROCEDURE — 99000 SPECIMEN HANDLING OFFICE-LAB: CPT | Performed by: OBSTETRICS & GYNECOLOGY

## 2024-05-29 PROCEDURE — 82105 ALPHA-FETOPROTEIN SERUM: CPT | Mod: 90 | Performed by: OBSTETRICS & GYNECOLOGY

## 2024-05-29 PROCEDURE — 86780 TREPONEMA PALLIDUM: CPT | Performed by: OBSTETRICS & GYNECOLOGY

## 2024-05-29 PROCEDURE — 99207 PR PRENATAL VISIT: CPT | Performed by: OBSTETRICS & GYNECOLOGY

## 2024-05-29 RX ORDER — CLOBETASOL PROPIONATE 0.5 MG/G
OINTMENT TOPICAL 2 TIMES DAILY
COMMUNITY
Start: 2024-05-29

## 2024-05-29 NOTE — PATIENT INSTRUCTIONS
Clobetasol for lichen flare--twice a day for 2 weeks, then daily for 2 weeks       Magnesium 400-500 mg daily

## 2024-05-29 NOTE — PROGRESS NOTES
Check AFP and RPR today since not drawn with NOB labs. Discussed GBS in urine. Reviewed not a UTI but can have recheck at anytime. Discussed foods that are bladder irritants. Having some left carpal tunnel sx, normal.  Having more HA and will start daily magnesium. Breast redness has resolved. Having some hip/vaginal pain discussed normal in pregnancy. RTC 4 weeks. BE

## 2024-05-31 LAB
# FETUSES US: NORMAL
AFP MOM SERPL: 0.94
AFP SERPL-MCNC: 23 NG/ML
AGE - REPORTED: 38.8 YR
CURRENT SMOKER: NO
FAMILY MEMBER DISEASES HX: NO
GA METHOD: NORMAL
GA: NORMAL WK
IDDM PATIENT QL: NO
INTEGRATED SCN PATIENT-IMP: NORMAL
SPECIMEN DRAWN SERPL: NORMAL

## 2024-06-07 ENCOUNTER — MYC MEDICAL ADVICE (OUTPATIENT)
Dept: OBGYN | Facility: CLINIC | Age: 39
End: 2024-06-07
Payer: COMMERCIAL

## 2024-06-07 DIAGNOSIS — R10.2 PELVIC PAIN IN PREGNANCY: Primary | ICD-10-CM

## 2024-06-07 DIAGNOSIS — O26.899 PELVIC PAIN IN PREGNANCY: Primary | ICD-10-CM

## 2024-06-12 ENCOUNTER — MYC MEDICAL ADVICE (OUTPATIENT)
Dept: OBGYN | Facility: CLINIC | Age: 39
End: 2024-06-12
Payer: COMMERCIAL
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
## 2024-06-12 NOTE — TELEPHONE ENCOUNTER
Given gestational age <20 and RH pos, okay for her to monitor for symptoms - rec she come in if severe pain, vaginal bleeding.  Thanks  Gala Baugh MD

## 2024-06-14 ENCOUNTER — PRENATAL OFFICE VISIT (OUTPATIENT)
Dept: OBGYN | Facility: CLINIC | Age: 39
End: 2024-06-14
Payer: COMMERCIAL

## 2024-06-14 VITALS
WEIGHT: 225 LBS | HEART RATE: 89 BPM | TEMPERATURE: 97.9 F | DIASTOLIC BLOOD PRESSURE: 72 MMHG | BODY MASS INDEX: 31.83 KG/M2 | SYSTOLIC BLOOD PRESSURE: 113 MMHG

## 2024-06-14 DIAGNOSIS — O09.519 SUPERVISION OF PRIMIGRAVIDA OF ADVANCED MATERNAL AGE, ANTEPARTUM: ICD-10-CM

## 2024-06-14 DIAGNOSIS — N89.8 VAGINAL DISCHARGE: Primary | ICD-10-CM

## 2024-06-14 LAB
CLUE CELLS: ABNORMAL
TRICHOMONAS, WET PREP: ABNORMAL
WBC'S/HIGH POWER FIELD, WET PREP: ABNORMAL
YEAST, WET PREP: ABNORMAL

## 2024-06-14 PROCEDURE — 99207 PR PRENATAL VISIT: CPT | Performed by: OBSTETRICS & GYNECOLOGY

## 2024-06-14 PROCEDURE — 87210 SMEAR WET MOUNT SALINE/INK: CPT | Performed by: OBSTETRICS & GYNECOLOGY
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
## 2024-06-17 ENCOUNTER — OFFICE VISIT (OUTPATIENT)
Dept: MATERNAL FETAL MEDICINE | Facility: CLINIC | Age: 39
End: 2024-06-17
Attending: OBSTETRICS & GYNECOLOGY
Payer: COMMERCIAL

## 2024-06-17 ENCOUNTER — HOSPITAL ENCOUNTER (OUTPATIENT)
Dept: ULTRASOUND IMAGING | Facility: CLINIC | Age: 39
Discharge: HOME | End: 2024-06-17
Attending: OBSTETRICS & GYNECOLOGY
Payer: COMMERCIAL

## 2024-06-17 DIAGNOSIS — O35.9XX0 SUSPECTED FETAL ANOMALY, ANTEPARTUM, SINGLE OR UNSPECIFIED FETUS: ICD-10-CM

## 2024-06-17 DIAGNOSIS — O09.512 PRIMIGRAVIDA OF ADVANCED MATERNAL AGE IN SECOND TRIMESTER: Primary | ICD-10-CM

## 2024-06-17 DIAGNOSIS — O09.511 AMA (ADVANCED MATERNAL AGE) PRIMIGRAVIDA 35+, FIRST TRIMESTER: ICD-10-CM

## 2024-06-17 PROCEDURE — 99213 OFFICE O/P EST LOW 20 MIN: CPT | Mod: 25 | Performed by: OBSTETRICS & GYNECOLOGY

## 2024-06-17 PROCEDURE — 76811 OB US DETAILED SNGL FETUS: CPT | Mod: 26 | Performed by: OBSTETRICS & GYNECOLOGY

## 2024-06-17 PROCEDURE — 76811 OB US DETAILED SNGL FETUS: CPT

## 2024-06-17 NOTE — PROGRESS NOTES
The patient was seen for an ultrasound in the [facility] at the [facility] today.  For a detailed report of the ultrasound examination, please see the ultrasound report which can be found under the imaging tab.    If you have questions regarding today's evaluation or if we can be of further service, please contact the Maternal-Fetal Medicine Center.    Lakeisha Suazo MD  , OB/GYN  Maternal-Fetal Medicine  [phone] (Pager)

## 2024-06-19 ENCOUNTER — MYC MEDICAL ADVICE (OUTPATIENT)
Dept: PSYCHIATRY | Facility: CLINIC | Age: 39
End: 2024-06-19
Payer: COMMERCIAL

## 2024-06-26 ENCOUNTER — MYC MEDICAL ADVICE (OUTPATIENT)
Dept: OBGYN | Facility: CLINIC | Age: 39
End: 2024-06-26
Payer: COMMERCIAL

## 2024-06-28 ENCOUNTER — PRENATAL OFFICE VISIT (OUTPATIENT)
Dept: OBGYN | Facility: CLINIC | Age: 39
End: 2024-06-28
Payer: COMMERCIAL

## 2024-06-28 VITALS
BODY MASS INDEX: 32.4 KG/M2 | TEMPERATURE: 97.7 F | HEART RATE: 84 BPM | HEIGHT: 71 IN | SYSTOLIC BLOOD PRESSURE: 103 MMHG | WEIGHT: 231.4 LBS | DIASTOLIC BLOOD PRESSURE: 70 MMHG | OXYGEN SATURATION: 98 %

## 2024-06-28 DIAGNOSIS — O09.519 SUPERVISION OF PRIMIGRAVIDA OF ADVANCED MATERNAL AGE, ANTEPARTUM: Primary | ICD-10-CM

## 2024-06-28 PROCEDURE — 99207 PR PRENATAL VISIT: CPT | Performed by: OBSTETRICS & GYNECOLOGY
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
## 2024-07-08 ENCOUNTER — VIRTUAL VISIT (OUTPATIENT)
Dept: PSYCHIATRY | Facility: CLINIC | Age: 39
End: 2024-07-08
Payer: COMMERCIAL

## 2024-07-08 VITALS — HEIGHT: 71 IN | WEIGHT: 231 LBS | BODY MASS INDEX: 32.34 KG/M2

## 2024-07-08 DIAGNOSIS — F41.1 GAD (GENERALIZED ANXIETY DISORDER): Primary | ICD-10-CM

## 2024-07-08 DIAGNOSIS — F33.41 MAJOR DEPRESSIVE DISORDER, RECURRENT EPISODE, IN PARTIAL REMISSION (H): ICD-10-CM

## 2024-07-08 PROCEDURE — 99214 OFFICE O/P EST MOD 30 MIN: CPT | Mod: 95 | Performed by: NURSE PRACTITIONER

## 2024-07-08 PROCEDURE — G2211 COMPLEX E/M VISIT ADD ON: HCPCS | Mod: 95 | Performed by: NURSE PRACTITIONER

## 2024-07-08 ASSESSMENT — PAIN SCALES - GENERAL: PAINLEVEL: NO PAIN (0)

## 2024-07-08 NOTE — NURSING NOTE
Current patient location: [address]  [place] [address]    Is the patient currently in the state of [place]? YES    Visit mode:VIDEO    If the visit is dropped, the patient can be reconnected by: VIDEO VISIT: Send to e-mail at: [email or web address]    Will anyone else be joining the visit? NO  (If patient encounters technical issues they should call [phone]    How would you like to obtain your AVS? MyChart    Are changes needed to the allergy or medication list? Pt stated no changes to allergies and Pt stated no med changes    Are refills needed on medications prescribed by this physician? NO    Reason for visit: RECHECK     Nita ENG

## 2024-07-08 NOTE — PATIENT INSTRUCTIONS
"Patient Education   The Panel Psychiatry Program  What to Expect  Here's what to expect in the Panel Psychiatry Program.   About the program  You'll be meeting with a psychiatric doctor to check your mental health. A psychiatric doctor helps you deal with troubling thoughts and feelings by giving you medicine. They'll make sure you know the plan for your care. You may see them for a long time. When you're feeling better, they may refer you back to seeing your family doctor.   If you have any questions, we'll be glad to talk to you.  About visits  Be open  At your visits, please talk openly about your problems. It may feel hard, but it's the best way for us to help you.  Cancelling visits  If you can't come to your visit, please call us right away at [phone]. If you don't cancel at least 24 hours (1 full day) before your visit, that's \"late cancellation.\"  Not showing up for your visits  Being very late is the same as not showing up. You'll be a \"no show\" if:  You're more than 15 minutes late for a 30-minute (half hour) visit.  You're more than 30 minutes late for a 60-minute (full hour) visit.  If you cancel late or don't show up 2 times within 6 months, we may end your care.  Getting help between visits  If you need help between visits, you can call us Monday to Friday from 8 a.m. to 4:30 p.m. at [phone].  Emergency care  Call 911 or go to the nearest emergency department if your life or someone else's life is in danger.  Call 988 anytime to reach the national Suicide and Crisis hotline.  Medicine refills  To refill your medicine, call your pharmacy. You can also call [facility]'s Behavioral Access at [phone], Monday to Friday, 8 a.m. to 4:30 p.m. It can take 1 to 3 business days to get a refill.   Forms, letters, and tests  You may have papers to fill out, like FMLA, short-term disability, and workability. We can help you with these forms at your visits, but you must have an " appointment. You may need more than 1 visit for this, to be in an intensive therapy program, or both.  Before we can give you medicine for ADHD, we may refer you to get tested for it or confirm it another way.  We may not be able to give you an emotional support animal letter.  We don't do mental health checks ordered by the court.   We don't do mental health testing, but we can refer you to get tested.   Thank you for choosing us for your care.  For informational purposes only. Not to replace the advice of your health care provider. Copyright   2022 [facility]. All rights reserved. [employer] [id number] - 12/22.

## 2024-07-08 NOTE — PROGRESS NOTES
"Virtual Visit Details     Type of service:  Video Visit     Originating Location (pt. Location): Home    Distant Location (provider location):  Off-site  Platform used for Video Visit: [facility]       OUTPATIENT PSYCHIATRIC FOLLOW-UP      2024     Provider: PALOMA Cheek CNP      Appointment Start Time: 220p  Appointment End Time: 247pm     Name: Coty JESUS Pattie Doyle   : 1985                    Preferred Name: Coty      Screening Tools           3/29/2024     2:11 PM 3/12/2024     2:47 PM 2024     2:21 PM   PHQ   PHQ-9 Total Score 2 2 3   Q9: Thoughts of better off dead/self-harm past 2 weeks Not at all Not at all Not at all         3/12/2024     2:47 PM 2024     2:21 PM 2023     9:29 AM   BALBINA-7 SCORE   Total Score 2 (minimal anxiety) 5 (mild anxiety)    Total Score 2 5 8     PROMIS 10-Global Health (only subscores and total score):       3/25/2024    11:24 AM 2024     8:42 AM   PROMIS-10 Scores Only   Global Mental Health Score 13 12   Global Physical Health Score 16 12   PROMIS TOTAL - SUBSCORES 29 24          History of Present Illness      Patient attended the session alone.     Interim History:  I last saw oCty Doyle for outpatient psychiatry follow-up on 24. During that appointment, we established care, continued plan .     Current stressors include:  pregnancy      Coping mechanisms and supports include: Family and Friends    Side effects: Denies    Medication adherence: Reports good med adherence.    Psychiatric Review of Systems      Coty Doyle reports mood has been: \"good\"        Depression has been:   - Mood has been stable.      Anxiety has been:   - Navigating pregnancy.   - Managing anxiety.     Sleep has been:   - Vivid dream  - Bathroom waking.     Louann sxs: Denies    Psychosis sxs: Denies    ADHD sxs: Denies    PTSD sxs: Denies    SI/SIB: Denies SI/SIB/HI      Medications Prior to Appointment       Current Outpatient Medications " "  Medication Sig Dispense Refill    albuterol (PROAIR HFA/PROVENTIL HFA/VENTOLIN HFA) 108 (90 BASE) MCG/ACT Inhaler Inhale 2 puffs into the lungs as needed for shortness of breath or wheezing      clobetasol (TEMOVATE) 0.05 % external ointment Apply topically 2 times daily For 2 weeks, then once daily for 2 weeks then every other day for 2 weeks      Iron-Vitamin C (IRON 100/C) 100-250 MG TABS       Prenatal Vit-DSS-Fe Cbn-FA (PRENATAL AD PO)       sertraline (ZOLOFT) 50 MG tablet Take 1 tablet (50 mg) by mouth daily 90 tablet 3    Vitamin D, Cholecalciferol, 25 MCG (1000 UT) CAPS             Previous medication trials include but not limited to:  - sertraline                  Medication Compliance: Yes                 Pharmacogenomic Testing Completed: No      Medical History      History of head injuries: No  History of seizures: No  History of cardiac events: No  History of Tardive Dyskinesia: No      Past Medical History:   Diagnosis Date    CARDIOVASCULAR SCREENING; LDL GOAL LESS THAN 160 04/17/2012    Contraceptive management 04/18/2012    History of COVID-19     Jaw pain 12/31/2021    Nonintractable episodic headache, unspecified headache type 01/01/2022    Plantar warts 07/06/2012    Uncomplicated asthma         Surgery:   Past Surgical History:   Procedure Laterality Date    MANDIBLE SURGERY  08/2004    ORTHOPEDIC SURGERY  2004    Jaw surgery    PE TUBES      as child.     Primary Care Provider: PEDRO MIRELES MD        Social History      Current Living situation:  [place], [place] with Spouse/Partner.    Current use of drugs or alcohol: Denies     Tobacco use: No    Employment: Yes: [employer] employed full time    Relationship Status:      Vitals      Not obtained d/t virtual visit.     Ht 1.791 m (5' 10.5\")   Wt 104.8 kg (231 lb)   LMP 02/10/2024   BMI 32.68 kg/m      Labs        Most recent laboratory results reviewed and pertinent results include:   Lab on 05/06/2024   Component Date Value Ref " Range Status    WBC Count 05/06/2024 12.6 (H)  4.0 - 11.0 10e3/uL Final    RBC Count 05/06/2024 4.21  3.80 - 5.20 10e6/uL Final    Hemoglobin 05/06/2024 12.3  11.7 - 15.7 g/dL Final    Hematocrit 05/06/2024 36.4  35.0 - 47.0 % Final    MCV 05/06/2024 87  78 - 100 fL Final    MCH 05/06/2024 29.2  26.5 - 33.0 pg Final    MCHC 05/06/2024 33.8  31.5 - 36.5 g/dL Final    RDW 05/06/2024 14.2  10.0 - 15.0 % Final    Platelet Count 05/06/2024 216  150 - 450 10e3/uL Final    Treponema Antibody Total 05/06/2024 Nonreactive  Nonreactive Final    See Scanned Result 05/06/2024 MYRIAD NON-INVASIVE PRENATAL SCREENING PREQUEL-Scanned   Final   Prenatal Office Visit on 04/25/2024   Component Date Value Ref Range Status    Culture 04/25/2024 <10,000 CFU/mL Mixture of urogenital amalia   Final    Color Urine 04/25/2024 Yellow  Colorless, Straw, Light Yellow, Yellow Final    Appearance Urine 04/25/2024 Clear  Clear Final    Glucose Urine 04/25/2024 Negative  Negative mg/dL Final    Bilirubin Urine 04/25/2024 Negative  Negative Final    Ketones Urine 04/25/2024 Negative  Negative mg/dL Final    Specific Gravity Urine 04/25/2024 >=1.030  1.003 - 1.035 Final    Blood Urine 04/25/2024 Negative  Negative Final    pH Urine 04/25/2024 6.0  5.0 - 7.0 Final    Protein Albumin Urine 04/25/2024 Negative  Negative mg/dL Final    Urobilinogen Urine 04/25/2024 0.2  0.2, 1.0 E.U./dL Final    Nitrite Urine 04/25/2024 Negative  Negative Final    Leukocyte Esterase Urine 04/25/2024 Negative  Negative Final   Lab on 04/14/2024   Component Date Value Ref Range Status    hCG Quantitative 04/14/2024 82,972 (H)  <5 mIU/mL Final    Adult: 0-5 mIU/mL for healthy non-pregnant person  Neonates: Should be within normal ranges by 2 days after birth     Virtual Visit on 03/28/2024   Component Date Value Ref Range Status    Hepatitis C Antibody (External) 01/23/2024 Nonreactive  Nonreactive Final    ABO (External) 01/23/2024 A   Final    Rh (External) 01/23/2024  POSITIVE   Final    HIV 1&2 Antibody (External) 01/23/2024 Nonreactive  Nonreactive Final    Rubella Antibody IgG (External) 01/23/2024 Nonreactive  Nonreactive Final   My[facility] Medical Advice on 03/28/2024   Component Date Value Ref Range Status    TSH (External) 01/23/2024 1.67  0.270 - 4.2 mU/L Final    Rubella Antibody IgG (External) 01/23/2024 Reactive  Nonreactive Final    80.02    Hemoglobin A1C (External) 01/23/2024 5.4  4 - 5.7 % Final    HIV 1/2 Agn Khushi 4th Gen With Reflex 01/23/2024 Non-reactive   Final    ABO (External) 01/23/2024 A   Final    Rh (External) 01/23/2024 POSITIVE   Final    Vitamin D 25-OH Total 01/23/2024 23 (A)  29 - 30 Final    Varicella Zoster Antibody IgG (Ins* 01/23/2024 2,304.00   Final    Hepatitis C Antibody (External) 01/23/2024 Nonreactive  Nonreactive Final    Hepatitis B Surface Antigen (Exter* 01/23/2024 Nonreactive  Nonreactive Final    RBC Antibody Screen 01/23/2024 None detected   Final    Prolactin 01/23/2024 18.19  4.79 - 23.3 mcg/L Final   Lab on 03/21/2024   Component Date Value Ref Range Status    Influenza A PCR 03/21/2024 Negative  Negative Final    Influenza B PCR 03/21/2024 Negative  Negative Final    RSV PCR 03/21/2024 Negative  Negative Final    SARS CoV2 PCR 03/21/2024 Negative  Negative Final    NEGATIVE: SARS-CoV-2 (COVID-19) RNA not detected, presumed negative.   Lab on 12/21/2023   Component Date Value Ref Range Status    Estradiol 12/21/2023 14  pg/mL Final    Healthy Men:   11.3-43.2 pg/mL    Healthy Postmenopausal Women:  Postmenopause: <5-138 pg/mL    Healthy Pregnant Women:  1st trimester: 154-3243 pg/mL  2nd trimester: 1561-94795 pg/mL  3rd trimester: 8525->52026 pg/mL    Healthy Women Cycle Phase:  Follicular: 30.9-90.4 pg/mL  Ovulation: 60.4-533 pg/mL  Luteal: 60.4-232 pg/mL    Healthy Women Cycle Sub-Phase:  Early Follicular: 20.5-62.8 pg/mL  Intermediate Follicular: 26-79.8 pg/mL  Late Follicular: 49.5-233 pg/mL  Ovulation: 60.4-602 pg/mL  Early Luteal:  "51.1-179 pg/mL  Intermediate Luteal: 66.5-305 pg/mL  Late Luteal: 30.2-222 pg/mL    Luteinizing Hormone 12/21/2023 5.2  mIU/mL Final    FEMALE:  Age  0 - 6 mo:  <0.1-8.2 mIU/mL  6 mo - 11 years: <0.1-1.3 mIU/mL   11 - 14 years: <0.1-10 mIU/mL   14 - 19 years: 0.4-25 mIU/mL     19 years and older:   Follicular Phase: 2.4-12.6 mIU/mL  Ovulation Phase: 14.0-95.6 mIU/mL  Luteal Phase: 1.0-11.4  mIU/mL  Postmenopausal: 7.7-58.5 mIU/mL      FSH 12/21/2023 8.6  mIU/mL Final    19 years and older:   Follicular phase: 3.5-12.5 mIU/mL   Ovulation phase: 4.7-21.5 mIU/mL   Luteal phase: 1.7-7.7 mIU/mL   Postmenopause: 25.8-134.8 mIU/mL      Office Visit on 12/12/2023   Component Date Value Ref Range Status    Cholesterol 12/12/2023 249 (H)  <200 mg/dL Final    Triglycerides 12/12/2023 111  <150 mg/dL Final    Direct Measure HDL 12/12/2023 47 (L)  >=50 mg/dL Final    LDL Cholesterol Calculated 12/12/2023 180 (H)  <=100 mg/dL Final    Non HDL Cholesterol 12/12/2023 202 (H)  <130 mg/dL Final    Patient Fasting > 8hrs? 12/12/2023 Yes   Final    Lead Venous Blood 12/12/2023 <2.0  <=4.9 ug/dL Final    Comment: INTERPRETIVE INFORMATION: Lead, Blood (Venous)    Analysis performed by Inductively Coupled Plasma-Mass   Spectrometry (ICP-MS).    Elevated results may be due to skin or collection-related   contamination, including the use of a noncertified   lead-free tube. If contamination concerns exist due to   elevated levels of blood lead, confirmation with a second   specimen collected in a certified lead-free tube is   recommended.    Information sources for blood lead reference intervals and   interpretive comments include the CDC's \"Childhood Lead   Poisoning Prevention: Recommended Actions Based on Blood   Lead Level\" and the \"Adult Blood Lead Epidemiology and   Surveillance: Reference Blood Lead Levels (BLLs) for Adults   in the [place].\" Thresholds and time intervals for retesting,   medical evaluation, and response vary by state and "   regulatory body. Contact your [facility] Department of Health   and/or applicable regulatory agency for specific guidance   on medical management                            recommendations.    This test was developed and its performance characteristics   determined by [facility]. It has not been cleared or   approved by the U.S. Food and Drug Administration. This   test was performed in a CLIA-certified laboratory and is   intended for clinical purposes.         Group          Concentration   Comment    Children       3.5-19.9 ug/dL  Children under the age of 6                                 years are the most vulnerable                                 to the harmful effects of                                  lead exposure. Environmental                                  investigation and exposure                                  history to identify potential                                 sources of lead. Biological                                  and nutritional monitoring                                 are recommended. Follow-up                                  blood lead monitoring is                                  recommended.                                                           20-44.9 ug/dL   Lead hazard reduction and                                  prompt medical evaluation are                                 recommended. Contact a                                  Pediatric Environmental                                  Health Specialty Unit or                                  poison control center for                                  guidance.                   Greater than    Critical. Immediate medical                  44.9 ug/dL      evaluation, including                                  detailed neurological exam is                                 recommended. Consider                                  chelation therapy when                                 symptoms of lead toxicity   are                                   present. Contact a Pediatric                                  Environmental Health                                  Specialty Unit or poison                                  control center for                                                             assistance.    Adult          5-19.9 ug/dL    Medical removal is                                  recommended for pregnant                                  women or those who are trying                                 or may become pregnant.                                  Adverse health effects are                                  possible. Reduced lead                                  exposure and increased blood                                  lead monitoring are                                  recommended.                    20-69.9 ug/dL   Adverse health effects are                                  indicated. Medical removal                                  from lead exposure is                                  required by OSHA if blood                                  lead level exceeds 50 ug/dL.                                 Prompt medical evaluation is                                 recommended.                    Greater than    Critical. Immediate medical                                             69.9 ug/dL      evaluation is recommended.                                  Consider chelation therapy                                 when symptoms of lead                                  toxicity are present.  Performed By: [facility]  [address] [id number]  : Teodoro Medina MD, PhD  CLIA Number: [id number]    Prolactin 12/12/2023 21  5 - 23 ng/mL Final    Anti-Mullerian Hormone 12/12/2023 0.168  0.150 - 7.500 ng/mL Final   Lab on 09/01/2023   Component Date Value Ref Range Status    Campylobacter species 09/01/2023 Negative  Negative Final    Salmonella species 09/01/2023  Negative  Negative Final    Vibrio species 09/01/2023 Negative  Negative Final    Vibrio cholerae 09/01/2023 Negative  Negative Final    Yersinia enterocolitica 09/01/2023 Negative  Negative Final    Enteropathogenic E. coli (EPEC) 09/01/2023 Negative  Negative, NA Final    Shiga-like toxin-producing E. coli* 09/01/2023 Negative  Negative Final    Shigella/Enteroinvasive E. coli (E* 09/01/2023 Negative  Negative Final    Cryptosporidium species 09/01/2023 Negative  Negative Final    Giardia lamblia 09/01/2023 Negative  Negative Final    Norovirus Gl/Gll 09/01/2023 Negative  Negative Final    Rotavirus A 09/01/2023 Negative  Negative Final    Plesiomonas shigelloides 09/01/2023 Negative  Negative Final    Enteroaggregative E. coli (EAEC) 09/01/2023 Negative  Negative Final    Enterotoxigenic E. coli (ETEC) 09/01/2023 Negative  Negative Final    E. coli O157 09/01/2023 NA  Negative, NA Final    Cyclospora cayetanensis 09/01/2023 Negative  Negative Final    Entamoeba histolytica 09/01/2023 Negative  Negative Final    Adenovirus F40/41 09/01/2023 Negative  Negative Final    Astrovirus 09/01/2023 Negative  Negative Final    Sapovirus 09/01/2023 Negative  Negative Final   Office Visit on 06/05/2023   Component Date Value Ref Range Status    TSH 06/05/2023 1.42  0.30 - 4.20 uIU/mL Final    Ferritin 06/05/2023 41  6 - 175 ng/mL Final    Sodium 06/05/2023 138  136 - 145 mmol/L Final    Potassium 06/05/2023 4.6  3.4 - 5.3 mmol/L Final    Chloride 06/05/2023 103  98 - 107 mmol/L Final    Carbon Dioxide (CO2) 06/05/2023 24  22 - 29 mmol/L Final    Anion Gap 06/05/2023 11  7 - 15 mmol/L Final    Urea Nitrogen 06/05/2023 10.1  6.0 - 20.0 mg/dL Final    Creatinine 06/05/2023 0.88  0.51 - 0.95 mg/dL Final    Calcium 06/05/2023 9.3  8.6 - 10.0 mg/dL Final    Glucose 06/05/2023 91  70 - 99 mg/dL Final    Alkaline Phosphatase 06/05/2023 37  35 - 104 U/L Final    AST 06/05/2023 21  10 - 35 U/L Final    ALT 06/05/2023 11  10 - 35 U/L  Final    Protein Total 06/05/2023 7.1  6.4 - 8.3 g/dL Final    Albumin 06/05/2023 4.3  3.5 - 5.2 g/dL Final    Bilirubin Total 06/05/2023 0.3  <=1.2 mg/dL Final    GFR Estimate 06/05/2023 86  >60 mL/min/1.73m2 Final    eGFR calculated using 2021 CKD-EPI equation.    Magnesium 06/05/2023 2.0  1.7 - 2.3 mg/dL Final    WBC Count 06/05/2023 7.9  4.0 - 11.0 10e3/uL Final    RBC Count 06/05/2023 4.63  3.80 - 5.20 10e6/uL Final    Hemoglobin 06/05/2023 13.1  11.7 - 15.7 g/dL Final    Hematocrit 06/05/2023 41.4  35.0 - 47.0 % Final    MCV 06/05/2023 89  78 - 100 fL Final    MCH 06/05/2023 28.3  26.5 - 33.0 pg Final    MCHC 06/05/2023 31.6  31.5 - 36.5 g/dL Final    RDW 06/05/2023 14.6  10.0 - 15.0 % Final    Platelet Count 06/05/2023 208  150 - 450 10e3/uL Final    % Neutrophils 06/05/2023 70  % Final    % Lymphocytes 06/05/2023 19  % Final    % Monocytes 06/05/2023 7  % Final    % Eosinophils 06/05/2023 3  % Final    % Basophils 06/05/2023 1  % Final    % Immature Granulocytes 06/05/2023 0  % Final    NRBCs per 100 WBC 06/05/2023 0  <1 /100 Final    Absolute Neutrophils 06/05/2023 5.5  1.6 - 8.3 10e3/uL Final    Absolute Lymphocytes 06/05/2023 1.5  0.8 - 5.3 10e3/uL Final    Absolute Monocytes 06/05/2023 0.6  0.0 - 1.3 10e3/uL Final    Absolute Eosinophils 06/05/2023 0.3  0.0 - 0.7 10e3/uL Final    Absolute Basophils 06/05/2023 0.1  0.0 - 0.2 10e3/uL Final    Absolute Immature Granulocytes 06/05/2023 0.0  <=0.4 10e3/uL Final    Absolute NRBCs 06/05/2023 0.0  10e3/uL Final     No EKG on file.       Medical Review of Systems      Pertinent positives noted in HPI and below:   Review of Systems   All other systems reviewed and are negative.       Contraception: None Patient's last menstrual period was 02/10/2024.  Pregnancy status: Pregnant: 6/7 weeks gestation    Mental Status Exam      Appearance: awake, alert, adequately groomed, appeared stated age and no apparent distress  Attitude:  cooperative   Eye Contact:  good  Gait  "and Station: normal, no gross abnormalities noted by observation  Psychomotor Behavior:  no evidence of tardive dyskinesia, dystonia, or tics  Oriented to:  person, place, time, and situation  Attention Span and Concentration:  normal  Speech:  clear, coherent, regular rate, rhythm, and volume  Language: intact  Mood: \"Pretty good\"  Affect:  appropriate and in normal range  Associations:  no loose associations  Thought Process:  logical, linear and goal oriented  Thought Content:  no evidence of suicidal ideation or homicidal ideation, no evidence of psychotic thought, no auditory hallucinations present and no visual hallucinations present  Recent and Remote Memory:  Intact to interview. Not formally assessed. No amnesia.  Fund of Knowledge: appropriate  Insight: Partial to full  Judgment:  intact, adequate for safety  Impulse Control:  intact            Risk Assessment       Updated: 7/8/2024     Suicide assessment  Acute: Low  Chronic:Low  Imminent: Low     Risk factors  History of suicide attempts: No  History of self-injurious behavior: No  Beau. Axis I psychiatric diagnoses: Yes  Substance use disorder: No  Symptoms:  feeling inadequate  Family history of completed suicide or attempted suicide: No  Accessibility to firearms: No  Interpersonal factors: Pregnancy     Protective factors  Ability to cope with the stress: Yes  Family responsibility and supportive:Yes  Positive therapeutic relationships: Yes  Social support:Yes  Alevism beliefs:  No  Connectedness with mental health providers: Yes     Homicidal Risk  Acute: Low  Chronic: Low  Imminent: Low    Assessment     Coty Doyle is 39 year old female with a past psychiatry history of depression and anxiety who has been referred for medication management from primary care provider.  No previous psychiatric inpatient hospitalizations.  Denies history of suicidal ideation.  Denies history of suicide attempts.  Denies history of self harming behaviors.  " No overt concerns regarding chemical health history.  Did experience difficult childhood.     Coty LEE Pattie Doyle reports overall stability/control for mood and anxiety.  Continues to develop pregnancy and post pregnancy plan.  We continue to review postpartum considerations.  Plans to have  added as a proxy and would like to sign release of information for him to have access to contacting medical and psychiatry team as needed.  We continue to explore various resources that could be utilized.  Plans to continue with DBT group therapy after birth.  No imminent safety concerns identified.  We did discuss scheduling appointment in October prior to birth in mid November approximately as well as early December for postpartum check.      Pharmacologic:   -Continue sertraline 50 mg by mouth every bedtime        Psychosocial: Would benefit from individual therapy with focus of Dialectical Behavior Therapy (DBT). DBT would be helpful in addressing emotional regulation, distress tolerance, mindfulness, and interpersonal effectiveness.   -Continue group therapy through mental health systems             Diagnosis       Generalized anxiety disorder  Major depressive disorder, recurrent, in partial to full remission     Plan         1) Medications:   -Sertraline 50 mg by mouth every bedtime              MNPMP: I have queried the [place] and/or [place] Prescription Monitoring Program for this patient for the preceding 12 months, or reviewed the report provided by my proxy delegate. I have not identified any concerns.  2) Risk vs benefits of medications reviewed: Yes  3) Life style modifications: sleep hygiene, exercise, healthy diet  4) Medical concerns:   -Pregnancy, second trimester, 21 weeks     5) Others   -Continue group therapy  -Develop postpartum plan in the future (resource review, etc)  6) Refrain from drinking alcohol and/or use of drugs.   7) Please secure all prescription and OTC medications, sharps, and caustic  substances. Please remove all firearms and ammunition.  8) Review outside records, get JUSTINO's, coordinate with outside providers  - Obtain verbal JUSTINO for    9) In case of emergency call 911 or go to the nearest ER, this includes patient voicing thought of harming self or others as well as additional safety concerns   10) Follow-up: 10-12 weeks, or sooner if needed.    Administrative Billing:   Supportive therapy was provided, focusing on reflective listening and solution focused problem solving.    Total time preparing to see this patient, face-to-face time, documenting in the EHR, and coordinating care time on the same calendar date: 32 minutes    The longitudinal plan of care for the diagnosis(es)/condition(s) as documented were addressed during this visit. Due to the added complexity in care, I will continue to support Coty in the subsequent management and with ongoing continuity of care.      Signed:   PALOMA Cheek CNP on 7/8/2024 at 2:51 PM     Disclaimer: This note consists of symbols derived from keyboarding, dictation and/or voice recognition software. As a result, there may be errors in the script that have gone undetected. Please consider this when interpreting information found in this chart.

## 2024-07-08 NOTE — Clinical Note
Will you please assist in helping patient connect with the correct resources to provide JUSTINO for her to sign for verbal release of information with  as well as information on how to help  become proxy to her MyChart account. PALOMA Cheek CNP on 7/8/2024 at 2:52 PM

## 2024-07-09 ENCOUNTER — HOSPITAL ENCOUNTER (OUTPATIENT)
Dept: ULTRASOUND IMAGING | Facility: CLINIC | Age: 39
Discharge: HOME | End: 2024-07-09
Attending: OBSTETRICS & GYNECOLOGY
Payer: COMMERCIAL

## 2024-07-09 ENCOUNTER — OFFICE VISIT (OUTPATIENT)
Dept: MATERNAL FETAL MEDICINE | Facility: CLINIC | Age: 39
End: 2024-07-09
Attending: OBSTETRICS & GYNECOLOGY
Payer: COMMERCIAL

## 2024-07-09 DIAGNOSIS — O35.9XX0 SUSPECTED FETAL ANOMALY, ANTEPARTUM, SINGLE OR UNSPECIFIED FETUS: ICD-10-CM

## 2024-07-09 DIAGNOSIS — O09.512 PRIMIGRAVIDA OF ADVANCED MATERNAL AGE IN SECOND TRIMESTER: Primary | ICD-10-CM

## 2024-07-09 PROCEDURE — 76816 OB US FOLLOW-UP PER FETUS: CPT | Mod: 26 | Performed by: OBSTETRICS & GYNECOLOGY

## 2024-07-09 PROCEDURE — 76816 OB US FOLLOW-UP PER FETUS: CPT

## 2024-07-09 NOTE — NURSING NOTE
Patient reports positive fetal movement, no pain, no contractions, leaking of fluid, or bleeding.   SBAR given to MARLENA WEINSTEIN, see their note in Epic.

## 2024-07-09 NOTE — PROGRESS NOTES
The patient was seen for an ultrasound in the [facility] at the [facility] today.  For a detailed report of the ultrasound examination, please see the ultrasound report which can be found under the imaging tab.    If you have questions regarding today's evaluation or if we can be of further service, please contact the [facility].    Lakeisha Suazo MD  , OB/GYN  Maternal-Fetal Medicine  [phone] (Pager)

## 2024-08-01 ENCOUNTER — PRENATAL OFFICE VISIT (OUTPATIENT)
Dept: OBGYN | Facility: CLINIC | Age: 39
End: 2024-08-01
Payer: COMMERCIAL

## 2024-08-01 VITALS
HEIGHT: 71 IN | OXYGEN SATURATION: 97 % | DIASTOLIC BLOOD PRESSURE: 66 MMHG | BODY MASS INDEX: 33.15 KG/M2 | SYSTOLIC BLOOD PRESSURE: 102 MMHG | WEIGHT: 236.8 LBS | HEART RATE: 89 BPM

## 2024-08-01 DIAGNOSIS — O99.019 ANEMIA IN PREGNANCY: Primary | ICD-10-CM

## 2024-08-01 DIAGNOSIS — O99.019 ANEMIA IN PREGNANCY: ICD-10-CM

## 2024-08-01 DIAGNOSIS — O09.512 ENCOUNTER FOR SUPERVISION OF PRIMIGRAVIDA OF ADVANCED MATERNAL AGE IN SECOND TRIMESTER: Primary | ICD-10-CM

## 2024-08-01 LAB
ERYTHROCYTE [DISTWIDTH] IN BLOOD BY AUTOMATED COUNT: 14 % (ref 10–15)
FERRITIN SERPL-MCNC: 17 NG/ML (ref 6–175)
GLUCOSE 1H P 50 G GLC PO SERPL-MCNC: 139 MG/DL (ref 70–129)
HCT VFR BLD AUTO: 32.2 % (ref 35–47)
HGB BLD-MCNC: 10.7 G/DL (ref 11.7–15.7)
HGB BLD-MCNC: 10.7 G/DL (ref 11.7–15.7)
HOLD SPECIMEN: NORMAL
IRON BINDING CAPACITY (ROCHE): 325 UG/DL (ref 240–430)
IRON SATN MFR SERPL: 18 % (ref 15–46)
IRON SERPL-MCNC: 58 UG/DL (ref 37–145)
MCH RBC QN AUTO: 29.8 PG (ref 26.5–33)
MCHC RBC AUTO-ENTMCNC: 33.2 G/DL (ref 31.5–36.5)
MCV RBC AUTO: 90 FL (ref 78–100)
PLATELET # BLD AUTO: 182 10E3/UL (ref 150–450)
RBC # BLD AUTO: 3.59 10E6/UL (ref 3.8–5.2)
WBC # BLD AUTO: 11.2 10E3/UL (ref 4–11)

## 2024-08-01 PROCEDURE — 85027 COMPLETE CBC AUTOMATED: CPT | Performed by: OBSTETRICS & GYNECOLOGY

## 2024-08-01 PROCEDURE — 83540 ASSAY OF IRON: CPT | Performed by: OBSTETRICS & GYNECOLOGY

## 2024-08-01 PROCEDURE — 83550 IRON BINDING TEST: CPT | Performed by: OBSTETRICS & GYNECOLOGY

## 2024-08-01 PROCEDURE — 82728 ASSAY OF FERRITIN: CPT | Performed by: OBSTETRICS & GYNECOLOGY

## 2024-08-01 PROCEDURE — 36415 COLL VENOUS BLD VENIPUNCTURE: CPT | Performed by: OBSTETRICS & GYNECOLOGY

## 2024-08-01 PROCEDURE — 82950 GLUCOSE TEST: CPT | Performed by: OBSTETRICS & GYNECOLOGY

## 2024-08-01 PROCEDURE — 99207 PR PRENATAL VISIT: CPT | Performed by: OBSTETRICS & GYNECOLOGY

## 2024-08-01 RX ORDER — ASPIRIN 81 MG/1
TABLET, CHEWABLE ORAL
COMMUNITY
Start: 2024-06-24

## 2024-08-01 NOTE — PATIENT INSTRUCTIONS
Dear Coty     Birthplace are Tuesdays at 5pm and Saturdays at 11am.  You do not need to sign up in advance.  Please arrive a few minutes early and meet at the security desk at the main entrance of the [facility] (on [address]   You can park below the hospital in the [place].    Gala Baugh MD       Weeks 22 to 26 of Your Pregnancy: Care Instructions  Your baby's lungs are getting ready for breathing. Your baby may respond to your voice. Your baby likely turns less, and kicks or jerks more. Jerking may mean that your baby has hiccups.    Think about taking childbirth classes. And start to think about whether you want to have pain medicine during labor.    At your next doctor visit, you may be tested for anemia and for high blood sugar that first occurs during pregnancy (gestational diabetes). These conditions can cause problems for you and your baby.    To ease discomfort, such as back pain    Change your position often. Try not to sit or stand for too long.  Get some exercise. Things like walking or stretching may help.  Try using a heating pad or cold pack.    To ease or reduce swelling in your feet, ankles, hands, and fingers    Take off your rings.  Avoid high-sodium foods, such as potato chips.  Prop up your feet, and sleep with pillows under your feet.  Try to avoid standing for long periods of time.  Do not wear tight shoes.  Wear support stockings.  Kegel exercises to prevent urine from leaking    Squeeze your muscles as if you were trying not to pass gas. Your belly, legs, and buttocks shouldn't move. Hold the squeeze for 3 seconds, then relax for 5 to 10 seconds.    Add 1 second each week until you can squeeze for 10 seconds. Repeat the exercise 10 times a session. Do 3 to 8 sessions a day. If these exercises cause you pain, stop doing them and talk with your doctor.  Follow-up care is a key part of your treatment and safety. Be sure to make and go to all appointments, and  "call your doctor if you are having problems. It's also a good idea to know your test results and keep a list of the medicines you take.  Where can you learn more?  Go to https://www.[employer].net/patiented  Enter G264 in the search box to learn more about \"Weeks 22 to 26 of Your Pregnancy: Care Instructions.\"  Current as of: July 10, 2023               Content Version: 14.0    5826-0723 [employer].   Care instructions adapted under license by your healthcare professional. If you have questions about a medical condition or this instruction, always ask your healthcare professional. [employer] disclaims any warranty or liability for your use of this information.      "

## 2024-08-01 NOTE — PROGRESS NOTES
24w5d  Active fetal movement. Some hip pain, has appt with PT. No cramping or pelvic pain, no recent bleeding or leaking fluid. Mood stable.   Questions about what to bring to hospital, length of stay, visitor policy   Working on birth plan and medical health care directive  Has a   Childbirth classes? YES, planning on it  Plan on breastfeeding? Yes  Birthcontrol? undecided  Sex on ultrasound? boy  Circumsion? not sure yet  Peds doc? Unsure, discussed options    1h gtt and Hgb today, plan TdaP next time.    RTC 4w.   Gala Baugh MD

## 2024-08-05 ENCOUNTER — LAB (OUTPATIENT)
Dept: LAB | Facility: CLINIC | Age: 39
End: 2024-08-05
Payer: COMMERCIAL

## 2024-08-05 DIAGNOSIS — R73.09 ELEVATED GLUCOSE: Primary | ICD-10-CM

## 2024-08-05 DIAGNOSIS — R73.09 ELEVATED GLUCOSE: ICD-10-CM

## 2024-08-05 LAB
GESTATIONAL GTT 1 HR POST DOSE: 187 MG/DL (ref 60–179)
GESTATIONAL GTT 2 HR POST DOSE: 139 MG/DL (ref 60–154)
GESTATIONAL GTT 3 HR POST DOSE: 51 MG/DL (ref 60–139)
GLUCOSE P FAST SERPL-MCNC: 84 MG/DL (ref 60–94)

## 2024-08-05 PROCEDURE — 82951 GLUCOSE TOLERANCE TEST (GTT): CPT

## 2024-08-05 PROCEDURE — 36415 COLL VENOUS BLD VENIPUNCTURE: CPT

## 2024-08-05 PROCEDURE — 82952 GTT-ADDED SAMPLES: CPT

## 2024-08-13 ENCOUNTER — MYC MEDICAL ADVICE (OUTPATIENT)
Dept: OBGYN | Facility: CLINIC | Age: 39
End: 2024-08-13
Payer: COMMERCIAL

## 2024-08-13 NOTE — CONFIDENTIAL NOTE
She needs to switch to the midwife team for a water birth. If she desires just a discussion on it the MDs can discuss it with her or she is welcome to come chat with us also. Thanks, PALOMA Khoury CNM

## 2024-08-16 ENCOUNTER — THERAPY VISIT (OUTPATIENT)
Dept: PHYSICAL THERAPY | Facility: CLINIC | Age: 39
End: 2024-08-16
Attending: OBSTETRICS & GYNECOLOGY
Payer: COMMERCIAL

## 2024-08-16 DIAGNOSIS — O26.899 PELVIC PAIN IN PREGNANCY: Primary | ICD-10-CM

## 2024-08-16 DIAGNOSIS — M62.08 DIASTASIS OF RECTUS ABDOMINIS: ICD-10-CM

## 2024-08-16 DIAGNOSIS — R10.2 PELVIC PAIN IN PREGNANCY: Primary | ICD-10-CM

## 2024-08-16 PROCEDURE — 97110 THERAPEUTIC EXERCISES: CPT | Mod: GP

## 2024-08-16 PROCEDURE — 97161 PT EVAL LOW COMPLEX 20 MIN: CPT | Mod: GP

## 2024-08-16 PROCEDURE — 97530 THERAPEUTIC ACTIVITIES: CPT | Mod: GP

## 2024-08-16 NOTE — PROGRESS NOTES
PHYSICAL THERAPY EVALUATION  Type of Visit: Evaluation        Fall Risk Screen:  Fall screen completed by: PT  Have you fallen 2 or more times in the past year?: No  Have you fallen and had an injury in the past year?: No  Is patient a fall risk?: No    Subjective       Presenting condition or subjective complaint: Pregnant and due 11/16/24 pelvic floor strengthening. Sitting for long time then standing causes mid pelvic pain. Sleeping on side causes hip pain, comes and goes with activities but resolves quickly, pain 1-3/10. Side sleeper with pillow between knees, reports waking without pillow between knees for forward rotation. Reports glute, thigh, and hamstring tension. Increased LE edema.     Had seen PT for shoulder pain, is continuing HEP for shoulder management.     Date of onset: 06/09/24 (date of order)    Relevant medical history: Asthma; Hearing problems; Mental Illness; Overweight; Pregnant or breastfeeding; Vision problems   Dates & types of surgery: Floresita had tubes as a kid, and jaw surgery as an adult 2004    Prior diagnostic imaging/testing results:       Prior therapy history for the same diagnosis, illness or injury: No      Prior Level of Function  Transfers:   Ambulation:   ADL:   IADL:     Living Environment  Social support: With a significant other or spouse   Type of home: House   Stairs to enter the home: Yes 4 Is there a railing: Yes     Ramp: No   Stairs inside the home: Yes 12 Is there a railing: Yes     Help at home: None  Equipment owned:       Employment: Yes Analyst, [employer]  Hobbies/Interests: Reading, walking the dog, D&D    Patient goals for therapy: Walk better    Pain assessment:      Objective      PELVIC EVALUATION  ADDITIONAL HISTORY:  Sex assigned at birth: Female  Gender identity: Female    Pronouns: She/Her Hers      Bladder History:  Feels bladder filling: Yes, increased urinary urges at night especially when lying on her back.   Triggers for feeling of  inability to wait to go to the bathroom: Yes Brushing teeth, laying on back  How long can you wait to urinate: 20-30 min  Gets up at night to urinate: Yes 2-3 times per night due to urge to urinate  Can stop the flow of urine when urinating: Yes  Volume of urine usually released: Medium   Other issues:    Number of bladder infections in last 12 months:    Fluid intake per day: 8 glasses 16 oz decalf    Medications taken for bladder: No     Activities causing urine leak:      Amount of urine typically leaked: NA  Pads used to help with leaking: No        Bowel History:  Infrequent pain with BMs.   Frequency of bowel movement: 2-3 times per day  Consistency of stool: Soft-formed    Ignores the urge to defecate: No  Other bowel issues: Pain when pooping; Loss of gas; Straining to have bowel movement  Length of time spent trying to have a bowel movement: 5 min    Sexual Function History:  Sexual orientation: Straight    Sexually active: No  Lubrication used: No No  Pelvic pain: Walking; Certain positions; Initial penetration (rectal or vaginal)    Pain or difficulty with orgasms/erection/ejaculation: No    State of menopause:    Hormone medications: No      Are you currently pregnant: Yes  I am this many weeks pregnant: 26  Number of previous pregnancies: 0  Number of deliveries: 0  Have you been diagnosed with pelvic prolapse or abdominal separation: No  Do you get regular exercise: Yes  I do this type of exercise: Walking, yoga, pilates  Have you tried pelvic floor strengthening exercises for 4 weeks: Yes  Do you have any history of trauma that is relevant to your care that you d like to share: No      Discussed reason for referral regarding pelvic health needs and external/internal pelvic floor muscle examination with patient/guardian.  Opportunity provided to ask questions and verbal consent for assessment and intervention was given.      POSTURE: Standing Posture: Lordosis increased  LUMBAR SCREEN:  Flexion:  reaches lower shin  Extension: min limited  Side glide: equal bilaterally, min limited   Segmental mobility: hypomobile CPA T9-12  HIP SCREEN:   Flexion B 5/5  Abd B 4/5  Side lying glutes B 4/5  Seated ADD B 4/5  Palpation: greater trochanter tenderness with mod palpation    Functional Strength Testing: Double Leg Squat: Anterior knee translation    PELVIC/SI SCREEN:  Stork: (-) bilat  Compression L side lying: (-)    BREATHING SYMMETRY: Decreased rib cage mobility    ABDOMINAL ASSESSMENT  Diastasis Rectus Abdominis (JOSE D):  JOSE D presence: Yes  Location   Above Umbilicus Depth/Finger width: 1.5 finger width, increased depth   At Umbilicus Depth/Finger width: <1   Below Umbilicus Depth/Finger width: <1  Abdominal bulge from xiphoid to 1 inch above umbilicus with rectus contraction      Assessment & Plan   CLINICAL IMPRESSIONS  Medical Diagnosis: Pelvic pain in pregnancy    Treatment Diagnosis: Hip weakness, JOSE D   Impression/Assessment: Patient is a 39 year old female with pelvic pain, hip pain, changes in gait complaints.  The following significant findings have been identified: Pain, Decreased ROM/flexibility, Decreased joint mobility, Decreased strength, Impaired gait, Impaired muscle performance, Decreased activity tolerance, and Impaired posture. These impairments interfere with their ability to perform self care tasks, work tasks, recreational activities, and community mobility as compared to previous level of function.     Clinical Decision Making (Complexity):  Clinical Presentation: Stable/Uncomplicated  Clinical Presentation Rationale: based on medical and personal factors listed in PT evaluation  Clinical Decision Making (Complexity): Low complexity    PLAN OF CARE  Treatment Interventions:  Modalities: Biofeedback, Ultrasound  Interventions: Manual Therapy, Neuromuscular Re-education, Therapeutic Activity, Therapeutic Exercise, Self-Care/Home Management    Long Term Goals     PT Goal 1  Goal Identifier:  Functional mobility  Goal Description: Patient will walk for at least 25 minutes independently with <2/10 hip or pelvic pain to walk her dog  Rationale: to maximize safety and independence with performance of ADLs and functional tasks  Target Date: 11/07/24  PT Goal 2  Goal Identifier: JOSE D  Goal Description: Patient will lift at least 10 pounds from knee to waist height for 10/10 repetitions with appropriate core stabilization without requiring cues and no back or pelvic pain  Rationale: to maximize safety and independence with performance of ADLs and functional tasks  Target Date: 11/07/24      Frequency of Treatment: 1x per week for 2 weeks, 1x per 2 weeks for 10 week adjusting frequency as indicated by patient presentation  Duration of Treatment: 12 weeks    Recommended Referrals to Other Professionals:   Education Assessment:   Learner/Method: Patient    Risks and benefits of evaluation/treatment have been explained.   Patient/Family/caregiver agrees with Plan of Care.     Evaluation Time:     PT Eval, Low Complexity Minutes (93097): 30       Signing Clinician: Lady Chery PT

## 2024-08-23 ENCOUNTER — THERAPY VISIT (OUTPATIENT)
Dept: PHYSICAL THERAPY | Facility: CLINIC | Age: 39
End: 2024-08-23
Attending: OBSTETRICS & GYNECOLOGY
Payer: COMMERCIAL

## 2024-08-23 DIAGNOSIS — M62.08 DIASTASIS OF RECTUS ABDOMINIS: ICD-10-CM

## 2024-08-23 DIAGNOSIS — R10.2 PELVIC PAIN IN PREGNANCY: Primary | ICD-10-CM

## 2024-08-23 DIAGNOSIS — O26.899 PELVIC PAIN IN PREGNANCY: Primary | ICD-10-CM

## 2024-08-23 PROCEDURE — 97530 THERAPEUTIC ACTIVITIES: CPT | Mod: GP

## 2024-08-23 PROCEDURE — 97110 THERAPEUTIC EXERCISES: CPT | Mod: GP

## 2024-08-29 ENCOUNTER — MYC MEDICAL ADVICE (OUTPATIENT)
Dept: OBGYN | Facility: CLINIC | Age: 39
End: 2024-08-29
Payer: COMMERCIAL

## 2024-08-30 ENCOUNTER — PRENATAL OFFICE VISIT (OUTPATIENT)
Dept: OBGYN | Facility: CLINIC | Age: 39
End: 2024-08-30
Payer: COMMERCIAL

## 2024-08-30 VITALS
OXYGEN SATURATION: 100 % | SYSTOLIC BLOOD PRESSURE: 104 MMHG | BODY MASS INDEX: 34.8 KG/M2 | WEIGHT: 246 LBS | HEART RATE: 91 BPM | DIASTOLIC BLOOD PRESSURE: 70 MMHG

## 2024-08-30 DIAGNOSIS — Z23 NEED FOR TDAP VACCINATION: ICD-10-CM

## 2024-08-30 DIAGNOSIS — O09.519 SUPERVISION OF PRIMIGRAVIDA OF ADVANCED MATERNAL AGE, ANTEPARTUM: Primary | ICD-10-CM

## 2024-08-30 PROCEDURE — 90471 IMMUNIZATION ADMIN: CPT | Performed by: OBSTETRICS & GYNECOLOGY

## 2024-08-30 PROCEDURE — 99207 PR PRENATAL VISIT: CPT | Performed by: OBSTETRICS & GYNECOLOGY

## 2024-08-30 PROCEDURE — 90715 TDAP VACCINE 7 YRS/> IM: CPT | Performed by: OBSTETRICS & GYNECOLOGY

## 2024-08-31 NOTE — PROGRESS NOTES
Tdap today. PT is helping with her hip pain. Has list of questions to answer today. Info on potassium in pregnancy, early labor, hydrotherapy, pain meds only when she wants and consents prior to exams, discussion of alternatives. Answered questions and reassured we will work with her. She is concerned about her anxiety and how that may affect her delivery but doesn't want to increase sertraline dose. S>D so will check growth u/s. RTC as scheduled. BE

## 2024-09-03 ENCOUNTER — MYC MEDICAL ADVICE (OUTPATIENT)
Dept: OBGYN | Facility: CLINIC | Age: 39
End: 2024-09-03
Payer: COMMERCIAL

## 2024-09-09 ENCOUNTER — PRENATAL OFFICE VISIT (OUTPATIENT)
Dept: OBGYN | Facility: CLINIC | Age: 39
End: 2024-09-09
Payer: COMMERCIAL

## 2024-09-09 ENCOUNTER — ANCILLARY PROCEDURE (OUTPATIENT)
Dept: ULTRASOUND IMAGING | Facility: CLINIC | Age: 39
End: 2024-09-09
Attending: OBSTETRICS & GYNECOLOGY
Payer: COMMERCIAL

## 2024-09-09 VITALS
BODY MASS INDEX: 34.94 KG/M2 | OXYGEN SATURATION: 98 % | WEIGHT: 247 LBS | HEART RATE: 97 BPM | SYSTOLIC BLOOD PRESSURE: 98 MMHG | DIASTOLIC BLOOD PRESSURE: 67 MMHG

## 2024-09-09 DIAGNOSIS — O09.519 SUPERVISION OF PRIMIGRAVIDA OF ADVANCED MATERNAL AGE, ANTEPARTUM: ICD-10-CM

## 2024-09-09 DIAGNOSIS — O09.519 SUPERVISION OF PRIMIGRAVIDA OF ADVANCED MATERNAL AGE, ANTEPARTUM: Primary | ICD-10-CM

## 2024-09-09 PROCEDURE — 76816 OB US FOLLOW-UP PER FETUS: CPT | Performed by: OBSTETRICS & GYNECOLOGY

## 2024-09-09 PROCEDURE — 99207 PR PRENATAL VISIT: CPT | Performed by: OBSTETRICS & GYNECOLOGY

## 2024-09-09 NOTE — PROGRESS NOTES
Ultrasound today and growth 62%, discussed 7lbs 11oz on SPEEDY. PT is helping but pain mostly when getting up after rest for awhile. Has baby showers in mid October. No questions this time. RTC 2 weeks. BE

## 2024-09-16 ENCOUNTER — THERAPY VISIT (OUTPATIENT)
Dept: PHYSICAL THERAPY | Facility: CLINIC | Age: 39
End: 2024-09-16
Payer: COMMERCIAL

## 2024-09-16 DIAGNOSIS — R10.2 PELVIC PAIN IN PREGNANCY: Primary | ICD-10-CM

## 2024-09-16 DIAGNOSIS — O26.899 PELVIC PAIN IN PREGNANCY: Primary | ICD-10-CM

## 2024-09-16 DIAGNOSIS — M62.08 DIASTASIS OF RECTUS ABDOMINIS: ICD-10-CM

## 2024-09-16 PROCEDURE — 97110 THERAPEUTIC EXERCISES: CPT | Mod: GP

## 2024-09-16 PROCEDURE — 97530 THERAPEUTIC ACTIVITIES: CPT | Mod: GP

## 2024-09-16 PROCEDURE — 97535 SELF CARE MNGMENT TRAINING: CPT | Mod: GP

## 2024-09-17 ENCOUNTER — MYC MEDICAL ADVICE (OUTPATIENT)
Dept: OBGYN | Facility: CLINIC | Age: 39
End: 2024-09-17
Payer: COMMERCIAL

## 2024-09-23 ENCOUNTER — PRENATAL OFFICE VISIT (OUTPATIENT)
Dept: OBGYN | Facility: CLINIC | Age: 39
End: 2024-09-23
Payer: COMMERCIAL

## 2024-09-23 VITALS
OXYGEN SATURATION: 100 % | SYSTOLIC BLOOD PRESSURE: 108 MMHG | HEART RATE: 99 BPM | WEIGHT: 250 LBS | BODY MASS INDEX: 35.36 KG/M2 | DIASTOLIC BLOOD PRESSURE: 74 MMHG

## 2024-09-23 DIAGNOSIS — Z23 HIGH PRIORITY FOR 2019-NCOV VACCINE: ICD-10-CM

## 2024-09-23 DIAGNOSIS — O09.519 SUPERVISION OF PRIMIGRAVIDA OF ADVANCED MATERNAL AGE, ANTEPARTUM: Primary | ICD-10-CM

## 2024-09-23 PROCEDURE — 99207 PR PRENATAL VISIT: CPT | Performed by: OBSTETRICS & GYNECOLOGY

## 2024-09-23 PROCEDURE — 91320 SARSCV2 VAC 30MCG TRS-SUC IM: CPT | Performed by: OBSTETRICS & GYNECOLOGY

## 2024-09-23 PROCEDURE — 90480 ADMN SARSCOV2 VAC 1/ONLY CMP: CPT | Performed by: OBSTETRICS & GYNECOLOGY

## 2024-09-23 ASSESSMENT — ASTHMA QUESTIONNAIRES
QUESTION_2 LAST FOUR WEEKS HOW OFTEN HAVE YOU HAD SHORTNESS OF BREATH: ONCE OR TWICE A WEEK
ACT_TOTALSCORE: 24
QUESTION_4 LAST FOUR WEEKS HOW OFTEN HAVE YOU USED YOUR RESCUE INHALER OR NEBULIZER MEDICATION (SUCH AS ALBUTEROL): NOT AT ALL
ACT_TOTALSCORE: 24
QUESTION_3 LAST FOUR WEEKS HOW OFTEN DID YOUR ASTHMA SYMPTOMS (WHEEZING, COUGHING, SHORTNESS OF BREATH, CHEST TIGHTNESS OR PAIN) WAKE YOU UP AT NIGHT OR EARLIER THAN USUAL IN THE MORNING: NOT AT ALL
QUESTION_5 LAST FOUR WEEKS HOW WOULD YOU RATE YOUR ASTHMA CONTROL: COMPLETELY CONTROLLED
QUESTION_1 LAST FOUR WEEKS HOW MUCH OF THE TIME DID YOUR ASTHMA KEEP YOU FROM GETTING AS MUCH DONE AT WORK, SCHOOL OR AT HOME: NONE OF THE TIME

## 2024-09-23 NOTE — PROGRESS NOTES
Birth plan reviewed and looks good. Surgical info given. Having some juan carlos sigala. Has meet and greet scheduled. Covid vaccine today. FH S>D so will get another growth u/s at 36 weeks. RTC as scheduled. BE

## 2024-10-02 ASSESSMENT — PATIENT HEALTH QUESTIONNAIRE - PHQ9
10. IF YOU CHECKED OFF ANY PROBLEMS, HOW DIFFICULT HAVE THESE PROBLEMS MADE IT FOR YOU TO DO YOUR WORK, TAKE CARE OF THINGS AT HOME, OR GET ALONG WITH OTHER PEOPLE: SOMEWHAT DIFFICULT
SUM OF ALL RESPONSES TO PHQ QUESTIONS 1-9: 6
SUM OF ALL RESPONSES TO PHQ QUESTIONS 1-9: 6

## 2024-10-03 ENCOUNTER — VIRTUAL VISIT (OUTPATIENT)
Dept: FAMILY MEDICINE | Facility: CLINIC | Age: 39
End: 2024-10-03
Payer: COMMERCIAL

## 2024-10-03 DIAGNOSIS — Z76.89 ENCOUNTER TO ESTABLISH CARE: Primary | ICD-10-CM

## 2024-10-03 PROCEDURE — 99213 OFFICE O/P EST LOW 20 MIN: CPT | Mod: 95 | Performed by: PHYSICIAN ASSISTANT

## 2024-10-03 NOTE — PROGRESS NOTES
"Coty is a 39 year old who is being evaluated via a billable video visit.    What phone number would you like to be contacted at? [phone]  How would you like to obtain your AVS? [employer]      Assessment & Plan     (Z76.89) Encounter to establish care  (primary encounter diagnosis)  Comment:     Plan: Questions answered today regarding philosophy of care with newborns, follow-up appointment after hospital stay with  and ongoing care and vaccinations.  Both Coty and her  were advised to send messages via [employer] or call the clinic with any further questions previous to birth or following.  Looking forward to taking care of their son.    BMI  Estimated body mass index is 35.36 kg/m  as calculated from the following:    Height as of 24: 1.791 m (5' 10.5\").    Weight as of 24: 113.4 kg (250 lb).             Subjective   Coty is a 39 year old, presenting for the following health issues:  meet and greet        10/3/2024     4:10 PM   Additional Questions   Roomed by Jessica   Accompanied by self         10/3/2024     4:10 PM   Patient Reported Additional Medications   Patient reports taking the following new medications none     History of Present Illness       Reason for visit:  Meet and Greet. Looking for family medicine practitioner for our baby who is due 24.   She is taking medications regularly.     Patient presents with her  for discussion of taking care of their son after he is born.  Patient is currently 33 weeks pregnant.    Currently being seen by Dr. Sesay, overall pregnancy going well.  Patient currently taking sertraline, will have questions about breast-feeding and sertraline and breastmilk.    Birth plan recently sent to Dr. Sesay.                Objective           Vitals:  No vitals were obtained today due to virtual visit.    Physical Exam   GENERAL: alert and no distress  EYES: Eyes grossly normal to inspection.  No discharge or erythema, or obvious " scleral/conjunctival abnormalities.  RESP: No audible wheeze, cough, or visible cyanosis.    SKIN: Visible skin clear. No significant rash, abnormal pigmentation or lesions.  NEURO: Cranial nerves grossly intact.  Mentation and speech appropriate for age.  PSYCH: Appropriate affect, tone, and pace of words          Video-Visit Details    Type of service:  Video Visit   Originating Location (pt. Location): Home    Distant Location (provider location):  On-site  Platform used for Video Visit: Ata  Signed Electronically by: Sunday Avilez PA-C

## 2024-10-07 ENCOUNTER — PRENATAL OFFICE VISIT (OUTPATIENT)
Dept: OBGYN | Facility: CLINIC | Age: 39
End: 2024-10-07
Payer: COMMERCIAL

## 2024-10-07 VITALS
WEIGHT: 257.1 LBS | OXYGEN SATURATION: 98 % | DIASTOLIC BLOOD PRESSURE: 83 MMHG | BODY MASS INDEX: 35.99 KG/M2 | HEIGHT: 71 IN | TEMPERATURE: 97 F | SYSTOLIC BLOOD PRESSURE: 109 MMHG | HEART RATE: 110 BPM

## 2024-10-07 DIAGNOSIS — Z29.11 NEED FOR RSV IMMUNIZATION: ICD-10-CM

## 2024-10-07 DIAGNOSIS — O09.519 SUPERVISION OF PRIMIGRAVIDA OF ADVANCED MATERNAL AGE, ANTEPARTUM: Primary | ICD-10-CM

## 2024-10-07 PROCEDURE — 99207 PR PRENATAL VISIT: CPT | Performed by: OBSTETRICS & GYNECOLOGY

## 2024-10-07 PROCEDURE — 90678 RSV VACC PREF BIVALENT IM: CPT | Performed by: OBSTETRICS & GYNECOLOGY

## 2024-10-07 PROCEDURE — 90471 IMMUNIZATION ADMIN: CPT | Performed by: OBSTETRICS & GYNECOLOGY

## 2024-10-07 NOTE — PROGRESS NOTES
34w2d  Doing well lately.  Some routine questions, which we reviewed.  RSV vaccine today.  GBS positive.  Has US and PNV in 2w.  Gala Marshall MD

## 2024-10-21 ENCOUNTER — ANCILLARY PROCEDURE (OUTPATIENT)
Dept: ULTRASOUND IMAGING | Facility: CLINIC | Age: 39
End: 2024-10-21
Attending: OBSTETRICS & GYNECOLOGY
Payer: COMMERCIAL

## 2024-10-21 ENCOUNTER — PRENATAL OFFICE VISIT (OUTPATIENT)
Dept: OBGYN | Facility: CLINIC | Age: 39
End: 2024-10-21
Payer: COMMERCIAL

## 2024-10-21 ENCOUNTER — THERAPY VISIT (OUTPATIENT)
Dept: PHYSICAL THERAPY | Facility: CLINIC | Age: 39
End: 2024-10-21
Payer: COMMERCIAL

## 2024-10-21 VITALS
DIASTOLIC BLOOD PRESSURE: 69 MMHG | SYSTOLIC BLOOD PRESSURE: 104 MMHG | WEIGHT: 259.7 LBS | RESPIRATION RATE: 20 BRPM | BODY MASS INDEX: 36.74 KG/M2 | HEART RATE: 94 BPM | OXYGEN SATURATION: 97 % | TEMPERATURE: 97.7 F

## 2024-10-21 DIAGNOSIS — O26.899 PELVIC PAIN IN PREGNANCY: Primary | ICD-10-CM

## 2024-10-21 DIAGNOSIS — O12.03 EDEMA DURING PREGNANCY IN THIRD TRIMESTER: ICD-10-CM

## 2024-10-21 DIAGNOSIS — M62.08 DIASTASIS OF RECTUS ABDOMINIS: ICD-10-CM

## 2024-10-21 DIAGNOSIS — R10.2 PELVIC PAIN IN PREGNANCY: Primary | ICD-10-CM

## 2024-10-21 DIAGNOSIS — Z23 NEED FOR PROPHYLACTIC VACCINATION AND INOCULATION AGAINST INFLUENZA: ICD-10-CM

## 2024-10-21 DIAGNOSIS — O09.519 SUPERVISION OF PRIMIGRAVIDA OF ADVANCED MATERNAL AGE, ANTEPARTUM: ICD-10-CM

## 2024-10-21 DIAGNOSIS — Z34.03 ENCOUNTER FOR SUPERVISION OF NORMAL FIRST PREGNANCY IN THIRD TRIMESTER: Primary | ICD-10-CM

## 2024-10-21 LAB
ERYTHROCYTE [DISTWIDTH] IN BLOOD BY AUTOMATED COUNT: 14.9 % (ref 10–15)
HCT VFR BLD AUTO: 32.6 % (ref 35–47)
HGB BLD-MCNC: 10.6 G/DL (ref 11.7–15.7)
MCH RBC QN AUTO: 28.6 PG (ref 26.5–33)
MCHC RBC AUTO-ENTMCNC: 32.5 G/DL (ref 31.5–36.5)
MCV RBC AUTO: 88 FL (ref 78–100)
PLATELET # BLD AUTO: 181 10E3/UL (ref 150–450)
RBC # BLD AUTO: 3.7 10E6/UL (ref 3.8–5.2)
WBC # BLD AUTO: 10.8 10E3/UL (ref 4–11)

## 2024-10-21 PROCEDURE — 36415 COLL VENOUS BLD VENIPUNCTURE: CPT | Performed by: OBSTETRICS & GYNECOLOGY

## 2024-10-21 PROCEDURE — 99207 PR PRENATAL VISIT: CPT | Performed by: OBSTETRICS & GYNECOLOGY

## 2024-10-21 PROCEDURE — 85027 COMPLETE CBC AUTOMATED: CPT | Performed by: OBSTETRICS & GYNECOLOGY

## 2024-10-21 PROCEDURE — 97530 THERAPEUTIC ACTIVITIES: CPT | Mod: GP

## 2024-10-21 PROCEDURE — 97110 THERAPEUTIC EXERCISES: CPT | Mod: GP

## 2024-10-21 PROCEDURE — 90656 IIV3 VACC NO PRSV 0.5 ML IM: CPT | Performed by: OBSTETRICS & GYNECOLOGY

## 2024-10-21 PROCEDURE — 90471 IMMUNIZATION ADMIN: CPT | Performed by: OBSTETRICS & GYNECOLOGY

## 2024-10-21 PROCEDURE — 76816 OB US FOLLOW-UP PER FETUS: CPT | Performed by: OBSTETRICS & GYNECOLOGY

## 2024-10-21 NOTE — PROGRESS NOTES
Return OB visit    Subjective:  Patient reports active fetal movement, no vaginal bleeding or leaking fluid. She denies contractions. She has no concerns today.        Objective:  /69 (BP Location: Left arm, Patient Position: Sitting, Cuff Size: Adult Large)   Pulse 94   Temp 97.7  F (36.5  C)   Resp 20   Wt 117.8 kg (259 lb 11.2 oz)   LMP 02/10/2024   SpO2 97%   BMI 36.74 kg/m     See OB flow sheet    Assessment and Plan    Coty Doyle is a 39 year old  at 36w2d here for PETE visit, pregnancy complicated by AMA    This visit:  -Discussed labor precautions and where to go/when to call   -Fetus cephalic by BSUS   -GBS positive in urine with NOB visit so swab deferred today   -Blood count checked  -Flu vaccine given   -DME for compression socks and Breast pump given     Next visit:  -Routine PNC     RTC in 1 week or sooner PRLUIS ENRIQUE Bautista MD

## 2024-10-28 ENCOUNTER — ANESTHESIA EVENT (OUTPATIENT)
Dept: OBGYN | Facility: CLINIC | Age: 39
End: 2024-10-28
Payer: COMMERCIAL

## 2024-10-28 ENCOUNTER — ANESTHESIA (OUTPATIENT)
Dept: OBGYN | Facility: CLINIC | Age: 39
End: 2024-10-28
Payer: COMMERCIAL

## 2024-10-28 ENCOUNTER — HOSPITAL ENCOUNTER (INPATIENT)
Facility: CLINIC | Age: 39
LOS: 2 days | Discharge: HOME HEALTH | End: 2024-10-30
Attending: OBSTETRICS & GYNECOLOGY | Admitting: OBSTETRICS & GYNECOLOGY
Payer: COMMERCIAL

## 2024-10-28 PROBLEM — Z36.89 ENCOUNTER FOR TRIAGE IN PREGNANT PATIENT: Status: ACTIVE | Noted: 2024-10-28

## 2024-10-28 PROBLEM — O42.90 PROM (PREMATURE RUPTURE OF MEMBRANES): Status: ACTIVE | Noted: 2024-10-28

## 2024-10-28 LAB
ABO/RH(D): NORMAL
ANTIBODY SCREEN: NEGATIVE
C TRACH DNA SPEC QL PROBE+SIG AMP: NEGATIVE
CLUE CELLS: ABNORMAL
CRYSTALS AMN MICRO: ABNORMAL
HGB BLD-MCNC: 10.8 G/DL (ref 11.7–15.7)
N GONORRHOEA DNA SPEC QL NAA+PROBE: NEGATIVE
RUPTURE OF FETAL MEMBRANES BY ROM PLUS: POSITIVE
SPECIMEN EXPIRATION DATE: NORMAL
T PALLIDUM AB SER QL: NONREACTIVE
TRICHOMONAS, WET PREP: ABNORMAL
WBC'S/HIGH POWER FIELD, WET PREP: ABNORMAL
YEAST, WET PREP: ABNORMAL

## 2024-10-28 PROCEDURE — 86900 BLOOD TYPING SEROLOGIC ABO: CPT

## 2024-10-28 PROCEDURE — 0KQM0ZZ REPAIR PERINEUM MUSCLE, OPEN APPROACH: ICD-10-PCS | Performed by: OBSTETRICS & GYNECOLOGY

## 2024-10-28 PROCEDURE — 86780 TREPONEMA PALLIDUM: CPT

## 2024-10-28 PROCEDURE — 250N000009 HC RX 250

## 2024-10-28 PROCEDURE — 258N000003 HC RX IP 258 OP 636

## 2024-10-28 PROCEDURE — 00HU33Z INSERTION OF INFUSION DEVICE INTO SPINAL CANAL, PERCUTANEOUS APPROACH: ICD-10-PCS | Performed by: STUDENT IN AN ORGANIZED HEALTH CARE EDUCATION/TRAINING PROGRAM

## 2024-10-28 PROCEDURE — 250N000011 HC RX IP 250 OP 636

## 2024-10-28 PROCEDURE — 86850 RBC ANTIBODY SCREEN: CPT

## 2024-10-28 PROCEDURE — 87210 SMEAR WET MOUNT SALINE/INK: CPT

## 2024-10-28 PROCEDURE — 59400 OBSTETRICAL CARE: CPT | Performed by: STUDENT IN AN ORGANIZED HEALTH CARE EDUCATION/TRAINING PROGRAM

## 2024-10-28 PROCEDURE — 999N000285 HC STATISTIC VASC ACCESS LAB DRAW WITH PIV START

## 2024-10-28 PROCEDURE — 87591 N.GONORRHOEAE DNA AMP PROB: CPT

## 2024-10-28 PROCEDURE — 250N000013 HC RX MED GY IP 250 OP 250 PS 637

## 2024-10-28 PROCEDURE — 999N000127 HC STATISTIC PERIPHERAL IV START W US GUIDANCE

## 2024-10-28 PROCEDURE — 722N000001 HC LABOR CARE VAGINAL DELIVERY SINGLE

## 2024-10-28 PROCEDURE — 85018 HEMOGLOBIN: CPT

## 2024-10-28 PROCEDURE — 59400 OBSTETRICAL CARE: CPT | Mod: 22 | Performed by: OBSTETRICS & GYNECOLOGY

## 2024-10-28 PROCEDURE — 0DQR0ZZ REPAIR ANAL SPHINCTER, OPEN APPROACH: ICD-10-PCS | Performed by: OBSTETRICS & GYNECOLOGY

## 2024-10-28 PROCEDURE — 370N000003 HC ANESTHESIA WARD SERVICE: Performed by: STUDENT IN AN ORGANIZED HEALTH CARE EDUCATION/TRAINING PROGRAM

## 2024-10-28 PROCEDURE — 3E0R3BZ INTRODUCTION OF ANESTHETIC AGENT INTO SPINAL CANAL, PERCUTANEOUS APPROACH: ICD-10-PCS | Performed by: STUDENT IN AN ORGANIZED HEALTH CARE EDUCATION/TRAINING PROGRAM

## 2024-10-28 PROCEDURE — 87491 CHLMYD TRACH DNA AMP PROBE: CPT

## 2024-10-28 PROCEDURE — 0UQMXZZ REPAIR VULVA, EXTERNAL APPROACH: ICD-10-PCS | Performed by: OBSTETRICS & GYNECOLOGY

## 2024-10-28 PROCEDURE — 250N000011 HC RX IP 250 OP 636: Performed by: OBSTETRICS & GYNECOLOGY

## 2024-10-28 PROCEDURE — G0463 HOSPITAL OUTPT CLINIC VISIT: HCPCS

## 2024-10-28 PROCEDURE — 120N000002 HC R&B MED SURG/OB UMMC

## 2024-10-28 PROCEDURE — 84112 EVAL AMNIOTIC FLUID PROTEIN: CPT

## 2024-10-28 RX ORDER — OXYTOCIN/0.9 % SODIUM CHLORIDE 30/500 ML
340 PLASTIC BAG, INJECTION (ML) INTRAVENOUS CONTINUOUS PRN
Status: DISCONTINUED | OUTPATIENT
Start: 2024-10-28 | End: 2024-10-30 | Stop reason: HOSPADM

## 2024-10-28 RX ORDER — KETOROLAC TROMETHAMINE 30 MG/ML
30 INJECTION, SOLUTION INTRAMUSCULAR; INTRAVENOUS
Status: DISCONTINUED | OUTPATIENT
Start: 2024-10-28 | End: 2024-10-28

## 2024-10-28 RX ORDER — LOPERAMIDE HYDROCHLORIDE 2 MG/1
4 CAPSULE ORAL
Status: DISCONTINUED | OUTPATIENT
Start: 2024-10-28 | End: 2024-10-30 | Stop reason: HOSPADM

## 2024-10-28 RX ORDER — METOCLOPRAMIDE 10 MG/1
10 TABLET ORAL EVERY 6 HOURS PRN
Status: DISCONTINUED | OUTPATIENT
Start: 2024-10-28 | End: 2024-10-28 | Stop reason: HOSPADM

## 2024-10-28 RX ORDER — OXYTOCIN 10 [USP'U]/ML
10 INJECTION, SOLUTION INTRAMUSCULAR; INTRAVENOUS
Status: DISCONTINUED | OUTPATIENT
Start: 2024-10-28 | End: 2024-10-28 | Stop reason: HOSPADM

## 2024-10-28 RX ORDER — FENTANYL CITRATE-0.9 % NACL/PF 10 MCG/ML
100 PLASTIC BAG, INJECTION (ML) INTRAVENOUS EVERY 5 MIN PRN
Status: DISCONTINUED | OUTPATIENT
Start: 2024-10-28 | End: 2024-10-28 | Stop reason: HOSPADM

## 2024-10-28 RX ORDER — FENTANYL CITRATE 50 UG/ML
100 INJECTION, SOLUTION INTRAMUSCULAR; INTRAVENOUS EVERY 30 MIN PRN
Status: DISCONTINUED | OUTPATIENT
Start: 2024-10-28 | End: 2024-10-28 | Stop reason: HOSPADM

## 2024-10-28 RX ORDER — MISOPROSTOL 200 UG/1
400 TABLET ORAL
Status: DISCONTINUED | OUTPATIENT
Start: 2024-10-28 | End: 2024-10-30 | Stop reason: HOSPADM

## 2024-10-28 RX ORDER — NALOXONE HYDROCHLORIDE 0.4 MG/ML
0.4 INJECTION, SOLUTION INTRAMUSCULAR; INTRAVENOUS; SUBCUTANEOUS
Status: DISCONTINUED | OUTPATIENT
Start: 2024-10-28 | End: 2024-10-28 | Stop reason: HOSPADM

## 2024-10-28 RX ORDER — ACETAMINOPHEN 325 MG/1
650 TABLET ORAL EVERY 6 HOURS PRN
COMMUNITY
Start: 2024-10-28 | End: 2024-11-20

## 2024-10-28 RX ORDER — IBUPROFEN 800 MG/1
800 TABLET, FILM COATED ORAL EVERY 6 HOURS PRN
Status: DISCONTINUED | OUTPATIENT
Start: 2024-10-28 | End: 2024-10-30 | Stop reason: HOSPADM

## 2024-10-28 RX ORDER — SERTRALINE HYDROCHLORIDE 25 MG/1
50 TABLET, FILM COATED ORAL EVERY 24 HOURS
Status: DISCONTINUED | OUTPATIENT
Start: 2024-10-28 | End: 2024-10-30 | Stop reason: HOSPADM

## 2024-10-28 RX ORDER — LOPERAMIDE HYDROCHLORIDE 2 MG/1
4 CAPSULE ORAL
Status: DISCONTINUED | OUTPATIENT
Start: 2024-10-28 | End: 2024-10-28 | Stop reason: HOSPADM

## 2024-10-28 RX ORDER — DOCUSATE SODIUM 100 MG/1
100 CAPSULE, LIQUID FILLED ORAL DAILY
Status: DISCONTINUED | OUTPATIENT
Start: 2024-10-29 | End: 2024-10-28

## 2024-10-28 RX ORDER — FENTANYL/ROPIVACAINE/NS/PF 2MCG/ML-.1
PLASTIC BAG, INJECTION (ML) EPIDURAL
Status: DISCONTINUED | OUTPATIENT
Start: 2024-10-28 | End: 2024-10-28 | Stop reason: HOSPADM

## 2024-10-28 RX ORDER — CARBOPROST TROMETHAMINE 250 UG/ML
250 INJECTION, SOLUTION INTRAMUSCULAR
Status: DISCONTINUED | OUTPATIENT
Start: 2024-10-28 | End: 2024-10-28 | Stop reason: HOSPADM

## 2024-10-28 RX ORDER — OXYTOCIN/0.9 % SODIUM CHLORIDE 30/500 ML
340 PLASTIC BAG, INJECTION (ML) INTRAVENOUS CONTINUOUS PRN
Status: DISCONTINUED | OUTPATIENT
Start: 2024-10-28 | End: 2024-10-28 | Stop reason: HOSPADM

## 2024-10-28 RX ORDER — NALOXONE HYDROCHLORIDE 0.4 MG/ML
0.2 INJECTION, SOLUTION INTRAMUSCULAR; INTRAVENOUS; SUBCUTANEOUS
Status: DISCONTINUED | OUTPATIENT
Start: 2024-10-28 | End: 2024-10-28 | Stop reason: HOSPADM

## 2024-10-28 RX ORDER — CARBOPROST TROMETHAMINE 250 UG/ML
250 INJECTION, SOLUTION INTRAMUSCULAR
Status: DISCONTINUED | OUTPATIENT
Start: 2024-10-28 | End: 2024-10-30 | Stop reason: HOSPADM

## 2024-10-28 RX ORDER — MODIFIED LANOLIN
OINTMENT (GRAM) TOPICAL
Status: DISCONTINUED | OUTPATIENT
Start: 2024-10-28 | End: 2024-10-30 | Stop reason: HOSPADM

## 2024-10-28 RX ORDER — AMOXICILLIN 250 MG
1 CAPSULE ORAL DAILY
COMMUNITY
Start: 2024-10-28 | End: 2024-10-29

## 2024-10-28 RX ORDER — ONDANSETRON 4 MG/1
4 TABLET, ORALLY DISINTEGRATING ORAL EVERY 6 HOURS PRN
Status: DISCONTINUED | OUTPATIENT
Start: 2024-10-28 | End: 2024-10-28 | Stop reason: HOSPADM

## 2024-10-28 RX ORDER — METHYLERGONOVINE MALEATE 0.2 MG/ML
200 INJECTION INTRAVENOUS
Status: DISCONTINUED | OUTPATIENT
Start: 2024-10-28 | End: 2024-10-28 | Stop reason: HOSPADM

## 2024-10-28 RX ORDER — ONDANSETRON 2 MG/ML
4 INJECTION INTRAMUSCULAR; INTRAVENOUS EVERY 6 HOURS PRN
Status: DISCONTINUED | OUTPATIENT
Start: 2024-10-28 | End: 2024-10-28 | Stop reason: HOSPADM

## 2024-10-28 RX ORDER — FENTANYL CITRATE-0.9 % NACL/PF 10 MCG/ML
PLASTIC BAG, INJECTION (ML) INTRAVENOUS
Status: DISCONTINUED
Start: 2024-10-28 | End: 2024-10-29 | Stop reason: HOSPADM

## 2024-10-28 RX ORDER — OXYTOCIN/0.9 % SODIUM CHLORIDE 30/500 ML
100-340 PLASTIC BAG, INJECTION (ML) INTRAVENOUS CONTINUOUS PRN
Status: DISCONTINUED | OUTPATIENT
Start: 2024-10-28 | End: 2024-10-28

## 2024-10-28 RX ORDER — LOPERAMIDE HYDROCHLORIDE 2 MG/1
2 CAPSULE ORAL
Status: DISCONTINUED | OUTPATIENT
Start: 2024-10-28 | End: 2024-10-28 | Stop reason: HOSPADM

## 2024-10-28 RX ORDER — FENTANYL/ROPIVACAINE/NS/PF 2MCG/ML-.1
PLASTIC BAG, INJECTION (ML) EPIDURAL
Status: COMPLETED
Start: 2024-10-28 | End: 2024-10-28

## 2024-10-28 RX ORDER — METHYLERGONOVINE MALEATE 0.2 MG/ML
200 INJECTION INTRAVENOUS
Status: DISCONTINUED | OUTPATIENT
Start: 2024-10-28 | End: 2024-10-30 | Stop reason: HOSPADM

## 2024-10-28 RX ORDER — CITRIC ACID/SODIUM CITRATE 334-500MG
30 SOLUTION, ORAL ORAL
Status: DISCONTINUED | OUTPATIENT
Start: 2024-10-28 | End: 2024-10-28 | Stop reason: HOSPADM

## 2024-10-28 RX ORDER — SODIUM CHLORIDE, SODIUM LACTATE, POTASSIUM CHLORIDE, CALCIUM CHLORIDE 600; 310; 30; 20 MG/100ML; MG/100ML; MG/100ML; MG/100ML
INJECTION, SOLUTION INTRAVENOUS CONTINUOUS PRN
Status: DISCONTINUED | OUTPATIENT
Start: 2024-10-28 | End: 2024-10-28 | Stop reason: HOSPADM

## 2024-10-28 RX ORDER — MISOPROSTOL 200 UG/1
400 TABLET ORAL
Status: DISCONTINUED | OUTPATIENT
Start: 2024-10-28 | End: 2024-10-28 | Stop reason: HOSPADM

## 2024-10-28 RX ORDER — METOCLOPRAMIDE HYDROCHLORIDE 5 MG/ML
10 INJECTION INTRAMUSCULAR; INTRAVENOUS EVERY 6 HOURS PRN
Status: DISCONTINUED | OUTPATIENT
Start: 2024-10-28 | End: 2024-10-28 | Stop reason: HOSPADM

## 2024-10-28 RX ORDER — NALBUPHINE HYDROCHLORIDE 10 MG/ML
2.5-5 INJECTION INTRAMUSCULAR; INTRAVENOUS; SUBCUTANEOUS EVERY 6 HOURS PRN
Status: DISCONTINUED | OUTPATIENT
Start: 2024-10-28 | End: 2024-10-28

## 2024-10-28 RX ORDER — LIDOCAINE 40 MG/G
CREAM TOPICAL
Status: DISCONTINUED | OUTPATIENT
Start: 2024-10-28 | End: 2024-10-28 | Stop reason: HOSPADM

## 2024-10-28 RX ORDER — POLYETHYLENE GLYCOL 3350 17 G/17G
17 POWDER, FOR SOLUTION ORAL DAILY
Status: DISCONTINUED | OUTPATIENT
Start: 2024-10-29 | End: 2024-10-30 | Stop reason: HOSPADM

## 2024-10-28 RX ORDER — TRANEXAMIC ACID 10 MG/ML
1 INJECTION, SOLUTION INTRAVENOUS EVERY 30 MIN PRN
Status: DISCONTINUED | OUTPATIENT
Start: 2024-10-28 | End: 2024-10-30 | Stop reason: HOSPADM

## 2024-10-28 RX ORDER — ALBUTEROL SULFATE 90 UG/1
2 INHALANT RESPIRATORY (INHALATION)
Status: DISCONTINUED | OUTPATIENT
Start: 2024-10-28 | End: 2024-10-29

## 2024-10-28 RX ORDER — MISOPROSTOL 200 UG/1
800 TABLET ORAL
Status: DISCONTINUED | OUTPATIENT
Start: 2024-10-28 | End: 2024-10-30 | Stop reason: HOSPADM

## 2024-10-28 RX ORDER — OXYTOCIN 10 [USP'U]/ML
10 INJECTION, SOLUTION INTRAMUSCULAR; INTRAVENOUS
Status: DISCONTINUED | OUTPATIENT
Start: 2024-10-28 | End: 2024-10-28

## 2024-10-28 RX ORDER — MISOPROSTOL 200 UG/1
800 TABLET ORAL
Status: DISCONTINUED | OUTPATIENT
Start: 2024-10-28 | End: 2024-10-28 | Stop reason: HOSPADM

## 2024-10-28 RX ORDER — BISACODYL 10 MG
10 SUPPOSITORY, RECTAL RECTAL DAILY PRN
Status: DISCONTINUED | OUTPATIENT
Start: 2024-10-28 | End: 2024-10-30 | Stop reason: HOSPADM

## 2024-10-28 RX ORDER — PENICILLIN G 3000000 [IU]/50ML
3 INJECTION, SOLUTION INTRAVENOUS EVERY 4 HOURS
Status: DISCONTINUED | OUTPATIENT
Start: 2024-10-28 | End: 2024-10-28 | Stop reason: HOSPADM

## 2024-10-28 RX ORDER — HYDROCORTISONE 25 MG/G
CREAM TOPICAL 3 TIMES DAILY PRN
Status: DISCONTINUED | OUTPATIENT
Start: 2024-10-28 | End: 2024-10-30 | Stop reason: HOSPADM

## 2024-10-28 RX ORDER — IBUPROFEN 800 MG/1
800 TABLET, FILM COATED ORAL
Status: DISCONTINUED | OUTPATIENT
Start: 2024-10-28 | End: 2024-10-28

## 2024-10-28 RX ORDER — FENTANYL CITRATE 50 UG/ML
50 INJECTION, SOLUTION INTRAMUSCULAR; INTRAVENOUS EVERY 30 MIN PRN
Status: DISCONTINUED | OUTPATIENT
Start: 2024-10-28 | End: 2024-10-28

## 2024-10-28 RX ORDER — LOPERAMIDE HYDROCHLORIDE 2 MG/1
2 CAPSULE ORAL
Status: DISCONTINUED | OUTPATIENT
Start: 2024-10-28 | End: 2024-10-30 | Stop reason: HOSPADM

## 2024-10-28 RX ORDER — OXYTOCIN 10 [USP'U]/ML
10 INJECTION, SOLUTION INTRAMUSCULAR; INTRAVENOUS
Status: DISCONTINUED | OUTPATIENT
Start: 2024-10-28 | End: 2024-10-30 | Stop reason: HOSPADM

## 2024-10-28 RX ORDER — SENNOSIDES 8.6 MG
8.6 TABLET ORAL 2 TIMES DAILY
Status: DISCONTINUED | OUTPATIENT
Start: 2024-10-29 | End: 2024-10-30 | Stop reason: HOSPADM

## 2024-10-28 RX ORDER — IBUPROFEN 200 MG
600 TABLET ORAL EVERY 6 HOURS PRN
COMMUNITY
Start: 2024-10-28 | End: 2024-11-27

## 2024-10-28 RX ORDER — PENICILLIN G POTASSIUM 5000000 [IU]/1
5 INJECTION, POWDER, FOR SOLUTION INTRAMUSCULAR; INTRAVENOUS ONCE
Status: COMPLETED | OUTPATIENT
Start: 2024-10-28 | End: 2024-10-28

## 2024-10-28 RX ORDER — TRANEXAMIC ACID 10 MG/ML
1 INJECTION, SOLUTION INTRAVENOUS EVERY 30 MIN PRN
Status: DISCONTINUED | OUTPATIENT
Start: 2024-10-28 | End: 2024-10-28 | Stop reason: HOSPADM

## 2024-10-28 RX ORDER — ACETAMINOPHEN 325 MG/1
650 TABLET ORAL EVERY 4 HOURS PRN
Status: DISCONTINUED | OUTPATIENT
Start: 2024-10-28 | End: 2024-10-30 | Stop reason: HOSPADM

## 2024-10-28 RX ORDER — PROCHLORPERAZINE MALEATE 10 MG
10 TABLET ORAL EVERY 6 HOURS PRN
Status: DISCONTINUED | OUTPATIENT
Start: 2024-10-28 | End: 2024-10-28 | Stop reason: HOSPADM

## 2024-10-28 RX ORDER — OXYTOCIN/0.9 % SODIUM CHLORIDE 30/500 ML
1-24 PLASTIC BAG, INJECTION (ML) INTRAVENOUS CONTINUOUS
Status: DISCONTINUED | OUTPATIENT
Start: 2024-10-28 | End: 2024-10-28 | Stop reason: HOSPADM

## 2024-10-28 RX ADMIN — Medication: at 18:40

## 2024-10-28 RX ADMIN — SODIUM CHLORIDE, POTASSIUM CHLORIDE, SODIUM LACTATE AND CALCIUM CHLORIDE: 600; 310; 30; 20 INJECTION, SOLUTION INTRAVENOUS at 11:39

## 2024-10-28 RX ADMIN — SERTRALINE HYDROCHLORIDE 50 MG: 50 TABLET ORAL at 19:41

## 2024-10-28 RX ADMIN — FENTANYL CITRATE 100 MCG: 50 INJECTION INTRAMUSCULAR; INTRAVENOUS at 17:26

## 2024-10-28 RX ADMIN — PENICILLIN G 3 MILLION UNITS: 3000000 INJECTION, SOLUTION INTRAVENOUS at 15:30

## 2024-10-28 RX ADMIN — FENTANYL CITRATE 50 MCG: 50 INJECTION INTRAMUSCULAR; INTRAVENOUS at 16:36

## 2024-10-28 RX ADMIN — ONDANSETRON 4 MG: 2 INJECTION INTRAMUSCULAR; INTRAVENOUS at 16:23

## 2024-10-28 RX ADMIN — PENICILLIN G 3 MILLION UNITS: 3000000 INJECTION, SOLUTION INTRAVENOUS at 19:40

## 2024-10-28 RX ADMIN — PENICILLIN G POTASSIUM 5 MILLION UNITS: 5000000 POWDER, FOR SOLUTION INTRAMUSCULAR; INTRAPLEURAL; INTRATHECAL; INTRAVENOUS at 11:41

## 2024-10-28 RX ADMIN — LIDOCAINE HYDROCHLORIDE 20 ML: 10 INJECTION, SOLUTION EPIDURAL; INFILTRATION; INTRACAUDAL; PERINEURAL at 23:09

## 2024-10-28 RX ADMIN — Medication 2 MILLI-UNITS/MIN: at 11:58

## 2024-10-28 ASSESSMENT — ACTIVITIES OF DAILY LIVING (ADL)
ADLS_ACUITY_SCORE: 0

## 2024-10-28 NOTE — PROVIDER NOTIFICATION
10/28/24 0900   Provider Notification   Provider Name/Title Dr Downey   Method of Notification At Bedside     Dr Downey at bedside evaluating Coty for SROM. Coty is not grossly ruptured, minimal fluid noted w/SSE, ferning, ROM plus, and infection labs collected and sent. FHTs reactive. Uterine contractions every 2-5 minutes apart, very mild on palpation. Coty is overall comfortable and not feeling contractions. SVE 1.5/60/-3.

## 2024-10-28 NOTE — PROVIDER NOTIFICATION
10/28/24 1034   Provider Notification   Provider Name/Title DR MISAEL Cisneros and Dr Downey   Method of Notification At Bedside     Dr. Cisneros and Dr. Downey at bedside. Fern and ROM plus came back positive confirming SROM. Because Ctoy is still comfortable with uterine contractions and and her contractions are inconsistent in frequency, Dr. Cisneros is recommending augmentation with oxytocin. Coty agrees with this plan. BSUS confirms cephalic position. Plan for penicillin for GBS positive status.

## 2024-10-28 NOTE — CONSULTS
"Consult received for Vascular access care.  See LDA for details. For additional needs place \"Nursing to Consult for Vascular Access\" [id number] order in EPIC.  "

## 2024-10-28 NOTE — ANESTHESIA PREPROCEDURE EVALUATION
Anesthesia Pre-Procedure Evaluation    Patient: Coty Doyle   MRN: [id number] : 1985        Procedure :           Past Medical History:   Diagnosis Date    CARDIOVASCULAR SCREENING; LDL GOAL LESS THAN 160 2012    Contraceptive management 2012    History of COVID-19     Jaw pain 2021    Nonintractable episodic headache, unspecified headache type 2022    Plantar warts 2012    Uncomplicated asthma       Past Surgical History:   Procedure Laterality Date    MANDIBLE SURGERY  2004    ORTHOPEDIC SURGERY      Jaw surgery    PE TUBES      as child.      Allergies   Allergen Reactions    Animal Dander Unknown    Dust Mites Unknown    Lactose GI Disturbance and Itching    Other Environmental Allergy      Cat, mold/ dust and mouse urine,     Sulfa Antibiotics Rash     As a child      Social History     Tobacco Use    Smoking status: Never     Passive exposure: Never    Smokeless tobacco: Never    Tobacco comments:     Never smoked   Substance Use Topics    Alcohol use: Not Currently     Comment: rarely      Wt Readings from Last 1 Encounters:   10/28/24 117.5 kg (259 lb)        Anesthesia Evaluation            ROS/MED HX  ENT/Pulmonary:     (+)     JASON risk factors, snores loudly,               Intermittent, asthma Last exacerbation: Mild intermittent asthma , uncomplicated,                  Neurologic: Comment: Episodic tension-type headaches    (+)      migraines,                          Cardiovascular:       METS/Exercise Tolerance:     Hematologic:       Musculoskeletal: Comment: Shoulder pain, right  Diastasis of rectus       GI/Hepatic:       Renal/Genitourinary:       Endo:     (+)               Obesity,       Psychiatric/Substance Use:     (+) psychiatric history anxiety       Infectious Disease: Comment: Plantar warts  Hx/o COVID-19      Malignancy:       Other:            Physical Exam    Airway  airway exam normal      Mallampati: II   TM distance: < 3 FB  "  Neck ROM: full   Mouth opening: > 3 cm    Respiratory Devices and Support         Dental       (+) Completely normal teeth      Cardiovascular   cardiovascular exam normal          Pulmonary   pulmonary exam normal                OUTSIDE LABS:  CBC:   Lab Results   Component Value Date    WBC 10.8 10/21/2024    WBC 11.2 (H) 08/01/2024    HGB 10.8 (L) 10/28/2024    HGB 10.6 (L) 10/21/2024    HCT 32.6 (L) 10/21/2024    HCT 32.2 (L) 08/01/2024     10/21/2024     08/01/2024     BMP:   Lab Results   Component Value Date     06/05/2023     12/12/2022    POTASSIUM 4.6 06/05/2023    POTASSIUM 4.4 12/12/2022    CHLORIDE 103 06/05/2023    CHLORIDE 105 12/12/2022    CO2 24 06/05/2023    CO2 27 12/12/2022    BUN 10.1 06/05/2023    BUN 11.0 12/12/2022    CR 0.88 06/05/2023    CR 0.97 (H) 12/12/2022    GLC 91 06/05/2023    GLC 95 12/12/2022     COAGS: No results found for: \"PTT\", \"INR\", \"FIBR\"  POC: No results found for: \"BGM\", \"HCG\", \"HCGS\"  HEPATIC:   Lab Results   Component Value Date    ALBUMIN 4.3 06/05/2023    PROTTOTAL 7.1 06/05/2023    ALT 11 06/05/2023    AST 21 06/05/2023    ALKPHOS 37 06/05/2023    BILITOTAL 0.3 06/05/2023     OTHER:   Lab Results   Component Value Date    A1C 5.4 12/12/2022    KENDALL 9.3 06/05/2023    MAG 2.0 06/05/2023    TSH 1.42 06/05/2023       Anesthesia Plan    ASA Status:  3    NPO Status:  ELEVATED Aspiration Risk/Unknown    Anesthesia Type: Epidural.              Consents    Anesthesia Plan(s) and associated risks, benefits, and realistic alternatives discussed. Questions answered and patient/representative(s) expressed understanding.     - Discussed:     - Discussed with:  Patient            Postoperative Care            Comments:               Amalia Medrano MD    I have reviewed the pertinent notes and labs in the chart from the past 30 days and (re)examined the patient.  Any updates or changes from those notes are reflected in this note.             # " Drug Induced Platelet Defect: home medication list includes an antiplatelet medication      # Anemia: based on hgb <11           # Asthma: noted on problem list

## 2024-10-28 NOTE — PLAN OF CARE
Data: Patient presented to [facility]: 10/28/2024  6:18 AM.  Reason for maternal/fetal assessment is leaking vaginal fluid. Patient reports a gush of fluid followed by a trickle of vaginal fluid starting at 0515. Patient denies abdominal pain, pelvic pressure, nausea, vomiting, headache, visual disturbances, epigastric or RUQ pain, significant edema. Patient reports fetal movement is normal. Patient is a 37w2d . Prenatal record reviewed. Pregnancy has been complicated by advanced maternal age (>=34yo) and obesity (pre-pregnancy BMI >=35). Support person is present.     Fetal HR baseline was 135, variability is moderate (amplitude range 6 to 25 bpm). Accelerations: increase 15 bpm above baseline lasting 15 seconds. Decelerations: absent. Uterine assessment is mild by palpation during contractions and soft by palpation at rest. Cervix 1.5 cm dilated and 60% effaced. Fetal station -3. Fetal presentation   per  BSUS . Membranes:  SROM .    Vital signs wnl. Patient reports pain and is coping.     Action: Verbal consent for EFM. Triage assessment completed. Patient does not meet criteria for early labor discharge.     Response: Patient verbalized agreement with plan.

## 2024-10-28 NOTE — PROVIDER NOTIFICATION
10/28/24 8886   Pain/Comfort/Sleep   Patient Currently in Pain yes   Preferred Pain Scale Coping with Labor Algorhithm   Response to Labor/Coping States is not coping   Pain Goal/Comfort   Pain Location abdomen   Pain Side/Orientation lower   Pain Description intermittent   Pain Management Interventions medication (see MAR);MD notified (comment)   RASS ([place] Agitation-Sedation Scale)   RASS ([place] Agitation-Sedation Scale) 0-->alert and calm     Patient requesting epidural, awaiting anesthesia team availability d/t urgent . Patient updated and 100 mcg of fentanyl given in interim.

## 2024-10-28 NOTE — PROVIDER NOTIFICATION
10/28/24 1745   Provider Notification   Provider Name/Title Dr Mckeon   Method of Notification In Department   Notification Reason Decels     Early decelerations with contractions. Moderate vriability with accelerations in between contractions. Coty is repositioning with every contraction at times making it difficult to trace baby. Plan for pulse oximeter if Coty allows distinguish between maternal pulse and fetal heart tones.

## 2024-10-28 NOTE — ANESTHESIA PROCEDURE NOTES
"Epidural catheter Procedure Note    Pre-Procedure   Staff -        Anesthesiologist:  Amalia Whitley MD       Performed By: anesthesiologist       Location: OB       Procedure Start/Stop Times: 10/28/2024 6:30 PM       Pre-Anesthestic Checklist: patient identified, IV checked, risks and benefits discussed, informed consent, monitors and equipment checked, pre-op evaluation, at physician/surgeon's request and post-op pain management  Timeout:       Correct Patient: Yes        Correct Procedure: Yes        Correct Site: Yes        Correct Position: Yes   Procedure Documentation  Procedure: epidural catheter       Diagnosis: labor pain       Patient Position: sitting       Patient Prep/Sterile Barriers: sterile gloves, mask, patient draped       Skin prep: Chloraprep       Local skin infiltrated with mL of 1% lidocaine.        Insertion Site: L3-4. (midline approach).       Technique: LORT saline        JAYDON at 6 cm.       Needle Type: Piece of Cakey needle       Needle Gauge: 17.        Needle Length (Inches): 3.5        Catheter: 19 G.          Catheter threaded easily.           Threaded 12 cm at skin.         # of attempts: 1 and  # of redirects:  0    Assessment/Narrative         Paresthesias: No.       Test dose of 3 mL lidocaine 1.5% w/ 1:200,000 epinephrine at.         Test dose negative, 3 minutes after injection, for signs of intravascular, subdural, or intrathecal injection.       Insertion/Infusion Method: LORT saline       Aspiration negative for Heme or CSF via Epidural Catheter.    Medication(s) Administered   0.1% ropivacaine + 2 mcg/mL fentaNYL in NS - EPIDURAL   8 mL - 10/28/2024 6:35:00 PM    FOR [facility] ([facility] [place]) ONLY:   Pain Team Contact information: please page the Pain Team Via [employer]. Search \"Pain\". During daytime hours, please page the attending first. At night please page the resident first.      "

## 2024-10-28 NOTE — H&P
OB HISTORY AND PHYSICAL    Patient: Coty Doyle   MRN#: [id number]  YOB: 1985     HPI: Coty Doyle is a 39 year old  at 37w2d by 10w6d US, who presents today for leakage of fluid. Reports around 0515 this AM she noticed a moderate sized gush of fluid that saturated her underwear. Since then has noticed ongoing fluid leakage/trickling. Notes that fluid is watery in nature and not malodorous. During this time has also noticed some mild cramping, but denies painful contractions. Denies vaginal bleeding/spotting. Likewise denies fever, chills, headache, vision changes, SOB, chest pain, palpitations, nausea, emesis, RUQ pain, epigastric pain, dysuria, change in vaginal discharge, LE swelling/tenderness.     This pregnancy has been relatively uncomplicated. Takes PNV and Zoloft for BALBINA, no additional medications. No mood concerns on admission. No prior abdominal or pelvic surgeries. No known personal or family history of bleeding or clotting disorders.     Pregnancy notable for:   - BALBINA (PTA zoloft)   - AMA   - GBS bacteruria   - Cervical polyp   - Hx jaw surgery     OBGYN History:   -    - Last Pap: 2022 NILM HPV negative   - STI Hx: Denies   - Gyn surgeries: Denies     Prenatal Lab Results:  Lab Results   Component Value Date    HCT 32.6 10/21/2024    HCT 40.8 07/15/2020    HGB 10.6 10/21/2024    HGB 13.4 07/15/2020       Patient Active Problem List    Diagnosis Date Noted    Encounter for triage in pregnant patient 10/28/2024     Priority: Medium    Pelvic pain in pregnancy 2024     Priority: Medium    Diastasis of rectus abdominis 2024     Priority: Medium    Supervision of primigravida of advanced maternal age, antepartum 2024     Priority: Medium     Dates by LMP c/w 10 wk u/s, spontaneous pregnancy    AMA--first trimester--anterior placenta, normal  NIPT--  AFP--  Level 2--      Family history of thyroid disease 2024     Priority: Medium     Need for Tdap vaccination 03/28/2024     Priority: Medium    Sleep disorder 09/23/2022     Priority: Medium    Shoulder pain, right 09/09/2022     Priority: Medium    Episodic tension-type headache, not intractable 04/06/2022     Priority: Medium    Irritable bowel syndrome, unspecified type 04/06/2022     Priority: Medium    BMI 28.0-28.9,adult 01/01/2022     Priority: Medium    Snoring 01/01/2022     Priority: Medium    Anxiety 12/31/2021     Priority: Medium    Family history of melanoma 12/31/2021     Priority: Medium    Multiple pigmented nevi 12/31/2021     Priority: Medium    Eczema, unspecified type 12/31/2021     Priority: Medium    Mild intermittent asthma without complication      Priority: Medium       HISTORY  Past Medical History:   Diagnosis Date    CARDIOVASCULAR SCREENING; LDL GOAL LESS THAN 160 04/17/2012    Contraceptive management 04/18/2012    History of COVID-19     Jaw pain 12/31/2021    Nonintractable episodic headache, unspecified headache type 01/01/2022    Plantar warts 07/06/2012    Uncomplicated asthma        Past Surgical History:   Procedure Laterality Date    MANDIBLE SURGERY  08/2004    ORTHOPEDIC SURGERY  2004    Jaw surgery    PE TUBES      as child.       Family History   Problem Relation Age of Onset    Osteoporosis Mother     Varicose Veins Mother     Hypertension Father     Allergies Father     Eye Disorder Father         Glasses    Thyroid Disease Maternal Grandmother     Heart Disease Maternal Grandfather         Pace Maker    Diabetes Maternal Grandfather         Type 2 late in life    Prostate Cancer Maternal Grandfather         Prostate    Eye Disorder Maternal Grandfather         Glasses    Hypertension Paternal Grandfather         Recently passed in December 2021    Anxiety Disorder Sister     Anxiety Disorder Sister     Thyroid Disease Other         Maternal aunt    Obesity Other         Maternal cousin    Cancer Maternal Aunt     Coronary Artery Disease No family hx  "of        Social History     Tobacco Use    Smoking status: Never     Passive exposure: Never    Smokeless tobacco: Never    Tobacco comments:     Never smoked   Vaping Use    Vaping status: Never Used   Substance Use Topics    Alcohol use: Not Currently     Comment: rarely    Drug use: No       Medications Prior to Admission   Medication Sig Dispense Refill Last Dose/Taking    albuterol (PROAIR HFA/PROVENTIL HFA/VENTOLIN HFA) 108 (90 BASE) MCG/ACT Inhaler Inhale 2 puffs into the lungs as needed for shortness of breath or wheezing   More than a month    aspirin (ASA) 81 MG chewable tablet    Past Month    clobetasol (TEMOVATE) 0.05 % external ointment Apply topically 2 times daily For 2 weeks, then once daily for 2 weeks then every other day for 2 weeks   More than a month    cyanocobalamin (VITAMIN B-12) 500 MCG SUBL sublingual tablet    Past Week    Iron-Vitamin C (IRON 100/C) 100-250 MG TABS    Past Week    Prenatal Vit-DSS-Fe Cbn-FA (PRENATAL AD PO)    Past Week    sertraline (ZOLOFT) 50 MG tablet Take 1 tablet (50 mg) by mouth daily 90 tablet 3 10/27/2024 at  6:00 PM    Vitamin D, Cholecalciferol, 25 MCG (1000 UT) CAPS    Past Week       Allergies   Allergen Reactions    Animal Dander Unknown    Dust Mites Unknown    Lactose GI Disturbance and Itching    Other Environmental Allergy      Cat, mold/ dust and mouse urine,     Sulfa Antibiotics Rash     As a child        REVIEW OF SYSTEMS:  A 10 point review of systems was completed and was negative other than as noted in the HPI.    PHYSICAL EXAM  Patient Vitals for the past 24 hrs:   BP Temp Temp src Resp Height Weight   10/28/24 0656 114/74 98.3  F (36.8  C) Oral 16 1.791 m (5' 10.5\") 117.5 kg (259 lb)     Gen: Well-appearing, no acute distress, resting comfortably in triage stretcher   CV: Regular rate and rhythm   Lungs: CTAB, non-labored breathing  Abd: Gravid, non-tender, non-distended  Ext: 1+ peripheral extremity edema     SSE: Normal appearing external " genitalia. Vaginal vault with scant amount of watery-clear fluid, no overt pooling. Cervix with pedunculated ~2 cm polyp extruding from external cervical os.   Cervix: 1-2/60/-3 mid-position soft   Membranes: Ruptured by Fern Test, ROM+   Presentation: Cephalic by BSUS   Estimated Fetal Weight: 7.5#      FHT:  Monitoring External  FHT: Baseline 120 bpm; moderate variability; accels present;  decelerations absent   TOCO 1-2 contractions in 10 minutes    Studies: T&S, CBC, RPR   Results for orders placed or performed during the hospital encounter of 10/28/24 (from the past 24 hours)   Rupture of Fetal Membranes by ROM Plus   Result Value Ref Range    Rupture of Fetal Membranes by ROM Plus Positive (A) Negative, Invalid, Suggest Repeat    Narrative    It is recommended that the tests to detect rupture of the amniotic membranes should not be used without other clinical assessments to make clinical patient management decision.   Wet preparation    Specimen: Vagina; Swab   Result Value Ref Range    Trichomonas Absent Absent    Yeast Absent Absent    Clue Cells Absent Absent    WBCs/high power field 1+ (A) None   Fern Test for Rupture of Membranes   Result Value Ref Range    Fern Crystallization Ferning present (A) No ferning present       Assessment & Plan: 39 year old  at 37w2d by 10w6d US who presents with PROM at 0515 this morning. Pregnancy is notable for AMA, BALBINA.     # PROM   # Induction of Labor   - Admitted to L&D   - Membranes: Ruptured by ROM+, ferning; reports ROM at 0515 10/28/24    Cervix: 1-2/60/-3 mid-position soft, Bishops score  3.  - Admit for IOL in the setting of PROM   - Will plan to start with IV pitocin given PROM and minimally feeling contractions, do not suspect patient is in labor at this time   - Augmentation: IV pitocin   - Labs: CBC, T&S, RPR  - GBS bacteruira -- Intrapartum PCN indicated, patient NOT adeqauately treated for GBS prior to ROM given PROM    - Pain Control: Per patient  request; Discussed options. Planning on water birthing experience, epidural PRN   - Diet: Regular  - PPH Meds: Standard Meds    # BALBINA   - No mood concerns on admission   - PTA sertraline 50mg qdaily   - Mood check postpartum     # Cervical polyp   - Incidentally identified during exam at 17w6d   - Appears stable on speculum exam today, no evidence of bleeding   - Pt UTD on Pap screening, no hx abnromal Pap smears (last 12/2022 NILM HPV negative)    - Anticipate incidental polypectomy/spontaneous avulsion of polyp at time of delivery given pedunculated nature, plan to send to pathology if able     # PNC  - Rh positive, Rubella immune, GCT nml   - GBS bacteruria - PCN started on confirmation of ROM on presentation, NOT adequately treated   - Other prenatal labs wnl  - Imaging: placenta anterior   - S/p flu, Tdap vaccines  - Pap last 12/2022 NILM HPV negative   - Contraception: To discuss PP   - Feeding: Plans breast   - Growth US 10/21/2024: EFW 71% HC 35% AC 88% JAYLEN 17.53 ; Placenta: Anterior      # Hx jaw surgery   - No documented anatomic distortions o  - Anesthesiology to see on admission regarding intrapartum pain control, discuss implications for intubation in event of emergency, if applicable      # FWB:   - Cat I tracing, reactive; 7.5# by BSUS; EFW 7.5#   - Continuous Fetal Monitoring  - Intrauterine resuscitative measures prn    # PPH Risk:  - Medium (IOL, augment with pit, HTN, mag, triple III, prolonged labor, precipitous labor/delivery, h/o abruption, >5 deliveries, fibroids, large baby, poly, multiples, general anesthesia, shoulder dystocia, lacs/epis, op delivery, hematoma)- IV, type and screen    Patient discussed with Dr. Cisneros.     Dieudonne Downey MD   Obstetrics & Gynecology, PGY-2  10/28/2024 9:25 AM         Physician Attestation   I personally examined and evaluated this patient.  I discussed the patient with the resident/fellow and care team, and agree with the assessment and plan of care as  documented in the note.     Key findings: Category 1 tracing. PROM. Recommend pitocin augmentation when patient amenable.     Please see A&P for additional details of medical decision making.    I have personally reviewed the following data over the past 24 hrs:    N/A  \   10.8 (L)   / N/A     N/A N/A N/A /  N/A   N/A N/A N/A \         Kenia Cisneros MD  Date of Service (when I saw the patient): 10/28/24

## 2024-10-28 NOTE — PROGRESS NOTES
"   [facility] MD LABOR & DELIVERY PROGRESS NOTE:   October 28, 2024   5:08 PM         SUBJECTIVE:   Patient c/o nothing, more uncomfortable with contractions.    Contractions:  q 2-3 minutes  Leakage of fluid:  Yes: clear  Vaginal bleeding:  No  Pain controlled:  Yes           OBJECTIVE:     Vitals:    10/28/24 0656 10/28/24 1147 10/28/24 1419 10/28/24 1600   BP: 114/74 114/78     Resp: 16   16   Temp: 98.3  F (36.8  C) 98.4  F (36.9  C) 97.9  F (36.6  C) 97.9  F (36.6  C)   TempSrc: Oral Oral Oral Oral   Weight: 117.5 kg (259 lb)      Height: 1.791 m (5' 10.5\")            NST:  Fetal Heart Rate Tracing:   Category 1  Baseline: 135  Variability: Moderate  Accels, no decels    Tocometer: q 2-3 minutes, pitocin 6 mu/min    Abdomen:  gravid, NT  Cervix:   Dilation: 3   Effacement: 90%   Station:+1   Consistency: soft   Position: Mid  Per RN         LABS:     Recent Results (from the past 12 hours)   Rupture of Fetal Membranes by ROM Plus    Collection Time: 10/28/24  8:59 AM   Result Value Ref Range    Rupture of Fetal Membranes by ROM Plus Positive (A) Negative, Invalid, Suggest Repeat   Wet preparation    Collection Time: 10/28/24  8:59 AM    Specimen: Vagina; Swab   Result Value Ref Range    Trichomonas Absent Absent    Yeast Absent Absent    Clue Cells Absent Absent    WBCs/high power field 1+ (A) None   Chlamydia trachomatis/Neisseria gonorrhoeae by PCR    Collection Time: 10/28/24  8:59 AM    Specimen: Vagina; Swab   Result Value Ref Range    Chlamydia Trachomatis Negative Negative    Neisseria gonorrhoeae Negative Negative   Fern Test for Rupture of Membranes    Collection Time: 10/28/24  9:08 AM   Result Value Ref Range    Fern Crystallization Ferning present (A) No ferning present   Hemoglobin    Collection Time: 10/28/24 10:24 AM   Result Value Ref Range    Hemoglobin 10.8 (L) 11.7 - 15.7 g/dL   Treponema Abs w Reflex to RPR and Titer    Collection Time: 10/28/24 10:24 AM   Result Value Ref Range    Treponema " Antibody Total Nonreactive Nonreactive   Adult Type and Screen    Collection Time: 10/28/24 10:24 AM   Result Value Ref Range    ABO/RH(D) A POS     Antibody Screen Negative Negative    SPECIMEN EXPIRATION DATE [id number]               ASSESSMENT:   39 year old  at 37w2d   active labor management, PROM, and labor augmentation with pitocin.  Penicillin adequate for GBS           PLAN:   Admit - see IP orders  Prophylactic antibiotic for + GBS status  Anticipate   Fentanyl now - planning epidural soon.    Kenia Cisneros MD

## 2024-10-28 NOTE — PLAN OF CARE
Goal Outcome Evaluation:      Plan of Care Reviewed With: patient, spouse    Overall Patient Progress: improvingOverall Patient Progress: improving     VSS. Afebrile. Pitocin at 6 berto/units. Received 50mcg IV fentanyl x1, requesting epidural. Plan for an additional dose of IV fentanyl while waiting for anesthesia. FHTs category one when tracing. Coty has been alternating positions frequently with each contraction making it difficult to continuously monitor baby. Clear fluid. , mother, and  at bedside and supportive. Anticipate .

## 2024-10-29 ENCOUNTER — MYC MEDICAL ADVICE (OUTPATIENT)
Dept: PSYCHIATRY | Facility: CLINIC | Age: 39
End: 2024-10-29
Payer: COMMERCIAL

## 2024-10-29 ENCOUNTER — MYC MEDICAL ADVICE (OUTPATIENT)
Dept: FAMILY MEDICINE | Facility: CLINIC | Age: 39
End: 2024-10-29
Payer: COMMERCIAL

## 2024-10-29 LAB — HGB BLD-MCNC: 9.9 G/DL (ref 11.7–15.7)

## 2024-10-29 PROCEDURE — 120N000002 HC R&B MED SURG/OB UMMC

## 2024-10-29 PROCEDURE — 250N000013 HC RX MED GY IP 250 OP 250 PS 637

## 2024-10-29 PROCEDURE — 36415 COLL VENOUS BLD VENIPUNCTURE: CPT

## 2024-10-29 PROCEDURE — 85018 HEMOGLOBIN: CPT

## 2024-10-29 RX ORDER — ALBUTEROL SULFATE 90 UG/1
2 INHALANT RESPIRATORY (INHALATION)
Status: DISCONTINUED | OUTPATIENT
Start: 2024-10-29 | End: 2024-10-30 | Stop reason: HOSPADM

## 2024-10-29 RX ORDER — POLYETHYLENE GLYCOL 3350 17 G/17G
1 POWDER, FOR SOLUTION ORAL DAILY
Qty: 510 G | Refills: 0 | Status: SHIPPED | OUTPATIENT
Start: 2024-10-29

## 2024-10-29 RX ORDER — AMOXICILLIN 250 MG
2 CAPSULE ORAL DAILY
COMMUNITY
Start: 2024-10-29

## 2024-10-29 RX ADMIN — SENNOSIDES 8.6 MG: 8.6 TABLET, FILM COATED ORAL at 10:33

## 2024-10-29 RX ADMIN — IBUPROFEN 800 MG: 800 TABLET ORAL at 17:04

## 2024-10-29 RX ADMIN — IBUPROFEN 800 MG: 800 TABLET ORAL at 23:19

## 2024-10-29 RX ADMIN — SERTRALINE HYDROCHLORIDE 50 MG: 50 TABLET ORAL at 18:31

## 2024-10-29 RX ADMIN — ACETAMINOPHEN 650 MG: 325 TABLET, FILM COATED ORAL at 23:19

## 2024-10-29 RX ADMIN — ACETAMINOPHEN 650 MG: 325 TABLET, FILM COATED ORAL at 08:25

## 2024-10-29 RX ADMIN — SENNOSIDES 8.6 MG: 8.6 TABLET, FILM COATED ORAL at 19:44

## 2024-10-29 RX ADMIN — ACETAMINOPHEN 650 MG: 325 TABLET, FILM COATED ORAL at 17:04

## 2024-10-29 RX ADMIN — ACETAMINOPHEN 650 MG: 325 TABLET, FILM COATED ORAL at 13:05

## 2024-10-29 RX ADMIN — POLYETHYLENE GLYCOL 3350 17 G: 17 POWDER, FOR SOLUTION ORAL at 08:30

## 2024-10-29 RX ADMIN — IBUPROFEN 800 MG: 800 TABLET ORAL at 10:33

## 2024-10-29 RX ADMIN — IBUPROFEN 800 MG: 800 TABLET ORAL at 04:38

## 2024-10-29 RX ADMIN — ACETAMINOPHEN 650 MG: 325 TABLET, FILM COATED ORAL at 04:38

## 2024-10-29 NOTE — PLAN OF CARE
Goal Outcome Evaluation: stable      Plan of Care Reviewed With: patient, spouse    Overall Patient Progress: improvingOverall Patient Progress: improving         Patient arrived to [facility] unit via wheelchair at 0130 ,with belongings, accompanied by spouse/ significant other, with infant in arms. Received report from  CRISS Bowling  and checked bands. Unit and room orientation started. Call light given and within arms reach; yes concerns present at this time, Pt due to void. Continue with plan of care.

## 2024-10-29 NOTE — PROGRESS NOTES
[facility]   Post-partum Note    Name:  Coty Doyle  MRN: [id number]    S: Patient is feeling well overall.  Pain well controlled.  Tolerating regular diet without nausea or vomiting, has only eaten a small amount.  Ambulating without dizziness to bathroom.  Lochia appropriate.  Attempting Breast feeding, has not had milk come in, would like to see lactation.  Plans condoms for BC.      O:   Patient Vitals for the past 24 hrs:   BP Temp Temp src Pulse Resp SpO2 Height Weight   10/29/24 0445 114/81 97.8  F (36.6  C) Oral 80 16 -- -- --   10/29/24 0440 121/83 -- -- -- -- -- -- --   10/29/24 0125 127/77 98.2  F (36.8  C) Oral 83 16 -- -- --   10/29/24 0045 119/58 -- -- -- -- 96 % -- --   10/29/24 0030 120/60 -- -- -- -- 98 % -- --   10/29/24 0015 117/77 -- -- -- -- 94 % -- --   10/29/24 0000 122/79 98  F (36.7  C) Oral -- 16 96 % -- --   10/28/24 2345 121/78 -- -- -- -- 97 % -- --   10/28/24 2330 (!) 141/65 -- -- -- -- 96 % -- --   10/28/24 2315 (!) 142/73 -- -- -- -- 97 % -- --   10/28/24 2300 (!) 144/76 -- -- -- -- 98 % -- --   10/28/24 2035 131/75 98.1  F (36.7  C) Oral -- 16 99 % -- --   10/28/24 2010 109/55 -- -- -- -- 98 % -- --   10/28/24 1935 115/57 -- -- -- -- 100 % -- --   10/28/24 1930 120/74 98  F (36.7  C) Oral -- 16 99 % -- --   10/28/24 1925 104/59 -- -- -- -- 98 % -- --   10/28/24 1918 112/56 -- -- -- 16 94 % -- --   10/28/24 1915 -- -- -- -- -- 100 % -- --   10/28/24 1910 -- -- -- -- -- 98 % -- --   10/28/24 1909 114/61 -- -- -- -- -- -- --   10/28/24 1905 -- -- -- -- -- 100 % -- --   10/28/24 1900 -- -- -- -- -- 92 % -- --   10/28/24 1859 127/65 -- -- -- -- -- -- --   10/28/24 1857 122/70 -- -- -- 18 -- -- --   10/28/24 1855 120/67 -- -- -- 16 100 % -- --   10/28/24 1853 120/71 -- -- -- -- -- -- --   10/28/24 1851 112/77 -- -- -- -- -- -- --   10/28/24 1850 -- -- -- -- -- 98 % -- --   10/28/24 1849 121/68 -- -- -- -- -- -- --   10/28/24 1841 122/62 -- -- -- -- --  "-- --   10/28/24 1840 -- 98.1  F (36.7  C) Oral -- 16 98 % -- --   10/28/24 1839 120/60 -- -- -- -- -- -- --   10/28/24 1837 134/59 -- -- -- -- -- -- --   10/28/24 1835 130/66 -- -- -- -- 100 % -- --   10/28/24 1834 134/63 -- -- -- -- -- -- --   10/28/24 1830 -- -- -- -- -- 97 % -- --   10/28/24 1740 -- 98.6  F (37  C) Oral -- -- -- -- --   10/28/24 1650 128/60 -- -- -- -- -- -- --   10/28/24 1600 -- 97.9  F (36.6  C) Oral -- 16 -- -- --   10/28/24 1419 -- 97.9  F (36.6  C) Oral -- -- -- -- --   10/28/24 1147 114/78 98.4  F (36.9  C) Oral -- -- -- -- --   10/28/24 0656 114/74 98.3  F (36.8  C) Oral -- 16 -- 1.791 m (5' 10.5\") 117.5 kg (259 lb)     Gen:  Resting comfortably, NAD  CV:  RR, well perfused  Pulm:  Breathing comfortably on room air  Abd:  Soft, appropriately ttp, non-distended. Fundus at 1 cm below umbilicus, firm and non-tender.  Ext:  non-tender, no LE edema b/l    I/O last 3 completed shifts:  In: 850 [I.V.:850]  Out: 75 [Urine:75]    Hgb:   Hemoglobin   Date Value Ref Range Status   10/28/2024 10.8 (L) 11.7 - 15.7 g/dL Final   07/15/2020 13.4 11.7 - 15.7 g/dL Final       Assessment/Plan:  Coty Blankenship [facility] 39 year old  on PPD #1 s/p  c/b 3a laceration. Doing well in PP period, working on breast feeding, ROBF. VSS, had elevated BP < 4h apart in second stage, normotensive since.     #Postpartum management  Pain: Well-controlled with ibuprofen and tylenol  Hgb: 10.8 >  > AM Hgb pending   Rh: Positive  Rubella: Immune  GI: Schedule bowel regimen ordered.   Feed: Breast  BC: Condoms   PPX: none; partner asked if any other recommendations RE: , which means 5 days in bed, 5 days around house and 5 days in the neighborhood. Discussed that we do not recommend bed rest in PP period given increased risk of DVT/PE, and patient should listen to her body in terms of level of activity, encouraged walking in halls today.     # BALBINA  - PTA Zoloft      #Lichen sclerosis  - Reviewed exam " findings during labor course, and discussed recommendation for a follow up exam in post partum period. Patient has been on clobetasol in the past, but not during this pregnancy. Is asymptomatic     Medically Ready for Discharge: Anticipated Tomorrow      Diane Jon MD PGY2  Obstetrics & Gynecology  10/29/24  6:55 AM     Attestation:   This patient was seen and evaluated by me, separately from the house staff team. I have reviewed the note/plan above and agree.     Discussed sitz baths for healing and comfort. She is concerned about milk supply and discussed it will come in 1-2 days. Seeing lactation today. Continue PTA zoloft and will RTC 2 weeks to recheck stitches near urethra and potentially remove if bothersome. Discussed IV out today and home tomorrow.     Katerine Leonard MD

## 2024-10-29 NOTE — PLAN OF CARE
Goal Outcome Evaluation: progressing      Plan of Care Reviewed With: patient, spouse    Overall Patient Progress: improvingOverall Patient Progress: improving         Vitals & PP assessments within normal range.   Up ad rd &   Voided x 1 - missed hat.   Breast feeding with moderate .   Encouraged to message & do skin to skin.

## 2024-10-29 NOTE — PROVIDER NOTIFICATION
10/28/24 1905   Provider Notification   Provider Name/Title Dr Mckeon   Method of Notification At Bedside     Dr Mckeon at bedside for SVE after epidural. FHTs having early and late decelerations after epidural and being on back for straight cath and SVE. Repositioned to left lateral side. Comfortable with epidural.

## 2024-10-29 NOTE — PROGRESS NOTES
S: patient comfortable w/ epidural, feeling intermittent pressure.    O: Temp: 98.1  F (36.7  C) Temp src: Oral BP: 122/62     Resp: 16 SpO2: 98 %       SVE: 8/90/-1  FHT: 125, moderate variability, accels, intermittent early and late decels  [place]: 4 in 10    A/P:  admitted at 37w2d for IOL following PROM. Currently on 6 mU pitocin for augmentation, titrate per protocol. Now in active labor. GBS pos, s/p adequate treatment of penicillin. FWB cat I overall, intermittently category II following epidural. Resolves with maternal repositioning. Will CTM.  Anticipate .     Diane Jon MD  Obstetrics and Gynecology, PGY-2  10/28/2024 7:12 PM

## 2024-10-29 NOTE — PROGRESS NOTES
VAGINAL DELIVERY DISCHARGE SUMMARY    Coty Doyle  : 1985  MRN: [id number]    Admit date: 10/28/2024  Discharge date: 10/30/24     Admit Dx:   - 39 year old  at 37w2d   - PROM  - BALBINA (PTA zoloft)   - AMA   - GBS bacteruria   - Cervical polyp   - Hx jaw surgery   - H/o lichen sclerosis        Discharge Dx:  - Same as above, s/p   - acute on chronic blood loss anemia  - 3a perineal laceration    Procedures:  - Spontaneous vaginal delivery  - Epidural analgesia    Admit HPI:  Ms. Coty Doyle is a  at 37w2d who was admitted for IOL iso PROM on 10/28/2024. Please see her admit H&P for full details of her PMH, PSH, Meds, Allergies and exam on admit.    Consults:  Anesthesia  Lactation    Hospital course:  Coty Doyle was admitted to the hospital on 10/28/2024 for the above listed indications. Penicillin was started and labor was initially managed expectantly, however contractions did not increase or intensify, so pitocin was started. Labor course was uncomplicated.  An epidural was administered for pain control. She progressed to complete, pushed with good maternal effort over 2.5 hours, during pushing cat II tracing with variables and late decels with pushing, that recovered in between with moderate variability and accels. Vulvar tissue was very friable, there was agglutination of bilateral labia minora, and superficial skin lacerations developing with pushing. Patient with known lichen sclerosis per chart review, recommend follow up exam in post partum period.      She had a spontaneous vaginal delivery of a viable male infant in direct OA position on 10/28 at 22:54.  A loose body nuchal cord was noted.  Apgars of 7 and 9 with weight of 3110 grams.  The cord was double clamped after 60 seconds and cut.  A cord segment and cord blood were obtained.  30U of IV pitocin was started . The placenta was then delivered using gentle traction and suprapubic pressure. The  uterus was noted to be firm after fundal massage.  The perineum was assessed for lacerations revealing  a 3a laceration with bilateral sulcal tears and a mikal-clitoral tear. A reinforcement stitch was placed in rectal capsule with a 2-0 vicryl. A subcutaneous stitch was placed to approximate the skin edges from superior aspect of the anus up to the superior aspect of the rectal muscle capsule using 4-0 vicryl. The remaining second degree laceration was repaired in standard fashion using 3-0 vicryl, incorporating the bilateral sulcal tears. One simple interrupted using 3-0 vicryl was placed at the right periurethral tear.  On final examination of the perineum, the repair was noted to be hemostatic.  Total QBL was 383 mL.  The placenta appeared intact with a 3V umbilical cord.  Dr. Cisneros was present for the entire procedure.      Please see her Delivery Summary for full details regarding her delivery.    Her postpartum course was uncomplicated. On PPD#2, she was meeting all of her postpartum goals and deemed stable for discharge. She was voiding without difficulty, tolerating a regular diet without nausea and vomiting, her pain was well controlled on oral pain medicines and her lochia was appropriate. Her hemoglobin prior to delivery was 10.8 and after delivery was 9.9. Her Rh status was A pos, and Rhogam was not indicated. She was rubella immune and a vaccine was therefore not indicated.    HGB  Recent Labs   Lab 10/29/24  0701 10/28/24  1024   HGB 9.9* 10.8*       Discharge Medications:     Review of your medicines        START taking        Dose / Directions   acetaminophen 325 MG tablet  Commonly known as: TYLENOL  Used for:  (spontaneous vaginal delivery)      Dose: 650 mg  Take 2 tablets (650 mg) by mouth every 6 hours as needed for mild pain. Start after Delivery.  Refills: 0     ferrous sulfate 325 (65 Fe) MG tablet  Commonly known as: FEROSUL  Used for:  (spontaneous vaginal delivery)      Dose: 325  mg  Take 1 tablet (325 mg) by mouth daily (with breakfast).  Quantity: 45 tablet  Refills: 0     ibuprofen 200 MG tablet  Commonly known as: ADVIL/MOTRIN  Used for:  (spontaneous vaginal delivery)      Dose: 600 mg  Take 3 tablets (600 mg) by mouth every 6 hours as needed for moderate pain. Start after delivery  Refills: 0     polyethylene glycol 17 GM/Dose powder  Commonly known as: MIRALAX  Used for:  (spontaneous vaginal delivery)      Dose: 1 Capful  Take 17 g (1 Capful) by mouth daily.  Quantity: 510 g  Refills: 0     senna-docusate 8.6-50 MG tablet  Commonly known as: SENOKOT-S/PERICOLACE  Used for:  (spontaneous vaginal delivery)      Dose: 2 tablet  Take 2 tablets by mouth daily. Start after delivery.  Refills: 0            CONTINUE these medicines which have NOT CHANGED        Dose / Directions   albuterol 108 (90 BASE) MCG/ACT Inhaler  Commonly known as: PROAIR HFA/PROVENTIL HFA/VENTOLIN HFA      Dose: 2 puff  Inhale 2 puffs into the lungs as needed for shortness of breath or wheezing  Refills: 0     clobetasol 0.05 % external ointment  Commonly known as: TEMOVATE      Apply topically 2 times daily For 2 weeks, then once daily for 2 weeks then every other day for 2 weeks  Refills: 0     cyanocobalamin 500 MCG Subl sublingual tablet  Commonly known as: VITAMIN B-12      Refills: 0     Iron 100/C 100-250 MG Tabs  Generic drug: Iron-Vitamin C      Refills: 0     PRENATAL AD PO      Refills: 0     sertraline 50 MG tablet  Commonly known as: ZOLOFT      Dose: 50 mg  Take 1 tablet (50 mg) by mouth daily  Quantity: 90 tablet  Refills: 3     Vitamin D (Cholecalciferol) 25 MCG (1000 UT) Caps      Refills: 0            STOP taking      aspirin 81 MG chewable tablet  Commonly known as: ASA                  Where to get your medicines        These medications were sent to [facility] [place], [place] - [address]  [address] [address]      Phone: [phone]    ferrous sulfate 325 (65 Fe) MG tablet  polyethylene glycol 17 GM/Dose powder       Some of these will need a paper prescription and others can be bought over the counter. Ask your nurse if you have questions.    You don't need a prescription for these medications  acetaminophen 325 MG tablet  ibuprofen 200 MG tablet  senna-docusate 8.6-50 MG tablet         Discharge/Disposition:  Coty Doyle was discharged to home in stable condition with the following instructions/medications:  1) Call for temperature > 100.4, bright red vaginal bleeding >1 pad an hour x 2 hours, foul smelling vaginal discharge, pain not controlled by usual oral pain meds, persistent nausea and vomiting not controlled on medications  2) She desired condoms for contraception.  3) For feeding she decided to breast feed.  4) She was instructed to follow-up with her primary OB in 6 weeks for a routine postpartum visit and in 2 weeks for an exam of the perineal laceration.  5) Acute blood loss anemia, asymptomatic: Patient discharged home on oral iron      Diane Jon MD  Obstetrics and Gynecology, PGY-2  10/30/2024 6:06 PM

## 2024-10-29 NOTE — LACTATION NOTE
This note was copied from a baby's chart.  Consult For: Late  Infant    Infant Name:     Infant's Primary Care Clinic: [facility]    Delivery Information: Baby boy was born at Gestational Age: 37w2d via vaginal delivery on 10/28/2024 10:54 PM     Maternal Health History:    Information for the patient's mother:  Coty Cooney [[id number]]     Past Medical History:   Diagnosis Date    CARDIOVASCULAR SCREENING; LDL GOAL LESS THAN 160 2012    Contraceptive management 2012    History of COVID-19     Jaw pain 2021    Nonintractable episodic headache, unspecified headache type 2022    Plantar warts 2012    Uncomplicated asthma     and   Information for the patient's mother:  Coty Cooney [[id number]]     Medications Prior to Admission   Medication Sig Dispense Refill Last Dose/Taking    albuterol (PROAIR HFA/PROVENTIL HFA/VENTOLIN HFA) 108 (90 BASE) MCG/ACT Inhaler Inhale 2 puffs into the lungs as needed for shortness of breath or wheezing   More than a month    clobetasol (TEMOVATE) 0.05 % external ointment Apply topically 2 times daily For 2 weeks, then once daily for 2 weeks then every other day for 2 weeks   More than a month    cyanocobalamin (VITAMIN B-12) 500 MCG SUBL sublingual tablet    Past Week    Iron-Vitamin C (IRON 100/C) 100-250 MG TABS    Past Week    Prenatal Vit-DSS-Fe Cbn-FA (PRENATAL AD PO)    Past Week    sertraline (ZOLOFT) 50 MG tablet Take 1 tablet (50 mg) by mouth daily 90 tablet 3 10/27/2024 at  6:00 PM    Vitamin D, Cholecalciferol, 25 MCG (1000 UT) CAPS    Past Week    [DISCONTINUED] aspirin (ASA) 81 MG chewable tablet    Past Month       Maternal Breast Exam:  Coty noted breast growth and sensitivity in early pregnancy. She denies any history of breast/chest injury or surgery. Her breasts are soft and symmetrical with bilateral intact, everts with stimulation  nipples. She has been able to hand express colostrum.      Breastfeeding/ Lactation History: first time breastfeeding    Oral exam of baby:  not done during our visit as baby was latched at the breast    Infant information: Baby was AGA at birth and has age appropriate output and weight loss.     Weight Change Since Birth: <24 hours old at time of consult    Feeding History:  Baby has been sleepy and was given a bottle of DBM a few hours after delivery.  Breastfeeding attempts have been made when he is awake and per mom, every 3-4 hours.    Feeding Assessment:  Baby is not very alert.  He is doing some minimal latching using a nipple shield, but his mouth is not very wide open and he appears to be mostly just sucking strongly on the tip of the nipple shield.  Attempted to help Coty get a better latch/hold, but she needed to be directed multiple times.  During our conversation, she expresses concerns multiple times.  She is worried that her milk will come in late, or that the baby is too sleepy, or that she needs to be doing more.    Education:   [] Potential feeding challenges of late  infants  [x] Potential benefits of using a nipple shield  [x] Expected  feeding patterns in the first few days (pg. 38 of Your Guide to To Postpartum and [place] Care)/ the Second Night  [x] Stages of milk production  [x] Benefits of hand expression of colostrum  [x] Early feeding cues   [x] Feeding frequency- encourage at least every 3 hours.     [x] Benefits of feeding on cue  [x] Benefits of skin to skin  [x] Breastfeeding positions  [x] Tips to get and maintain a deep latch  [x] Nutritive vs.non-nutritive sucking  [] Gentle breast compressions as needed to enhance milk transfer  [] How to tell when baby is finished  [] How to tell if baby is getting enough  [] Expected  output  []  weight loss  [x] Infant Feeding Log  [x] [employer] [facility] Breastfeeding/Pumping videos  [x] Signs breastfeeding is going well (comfortable latch, audible swallows, age appropriate  output and weight loss)    [] Tips to prevent engorgement  [] Signs of engorgement  [] Tips to manage engorgement  [] Hand expression and pumping recommendations  [] Supplement recommendations   [] Satiety cues   [] [facility] breast pump part/infant feeding supplies cleaning recommendations  [x] Inpatient breastfeeding support  [] Outpatient lactation resources and follow up recommendations    Handouts: Infant Feeding Log (Week 1, Your Guide to Postpartum &  Care Book)    Home Breast Pump: Coty has a Spectra (Synergy?) and does not need a pump prior to discharge    Plan: Continue breastfeeding every 2-3 hours with RN support as needed with a goal of 8-12 feedings per day. Watch for early feeding cues, but if too sleepy and no cues after 3 hours offer breast and go directly to supplementation if no interest.     Encourage frequent skin to skin. Due to infant prematurity, encourage hand expression after each feeding until milk is in and hands on pumping at least 6-8 times in 24 hours to help bring in full milk supply. Feed back any expressed milk and review order set for supplement recommendations. Continue giving expressed milk supplement until closer to expected due date, or as indicated with follow up lactation visits.      Family encouraged to follow up with outpatient lactation consultant at clinic within a week of discharge for support with LPT infant, help to monitor infant weight and milk transfer, establish supply & eventually wean from pumping and nipple shield.      April Mclain RN, IBCLC   Lactation Consultant  [place]: [facility] [phone]  Office: [phone]

## 2024-10-29 NOTE — L&D DELIVERY NOTE
OB Vaginal Delivery Note    L&D Delivery Note:     Coty Doyle is a 39 year old now  who presented at 37w2d following PROM.  Pregnancy was notable for BALBINA and GBS pos. Penicillin was started and labor was initially managed expectantly, however contractions did not increase or intensify, so pitocin was started. Labor course was uncomplicated.  An epidural was administered for pain control. She progressed to complete, pushed with good maternal effort over 2.5 hours, during pushing cat II tracing with variables and late decels with pushing, that recovered in between with moderate variability and accels. Vulvar tissue was very friable, there was agglutination of bilateral labia minora, and superficial skin lacerations developing with pushing. Patient with known lichen sclerosis per chart review, recommend follow up exam in post partum period.     She had a spontaneous vaginal delivery of a viable male infant in direct OA position on 10/28 at 22:54.  A loose body nuchal cord was noted.  Apgars of 7 and 9 with weight of 3110 grams.  The cord was double clamped after 60 seconds and cut.  A cord segment and cord blood were obtained.  30U of IV pitocin was started . The placenta was then delivered using gentle traction and suprapubic pressure. The uterus was noted to be firm after fundal massage.  The perineum was assessed for lacerations revealing  a 3a laceration with bilateral sulcal tears and a mikal-clitoral tear. A reinforcement stitch was placed in rectal capsule with a 2-0 vicryl. A subcuticular stitch was placed to approximate the skin edges from the apex near the anal verge anus for a few centimeters using 4-0 vicryl. The remaining second degree laceration was repaired in standard fashion using 3-0 vicryl, incorporating the left sulcal tear. Two simple interrupted stitches using 3-0 vicryl were placed at the right periurethral tear.  On final examination of the perineum, the repair was noted to be  hemostatic.  Total QBL was 383 mL.  The placenta appeared intact with a 3V umbilical cord.  Dr. Cisneros was present for the entire procedure.     Diane Hansen MD  Obstetrics and Gynecology, PGY-2  10/28/24 11:48 PM      Coty Doyle MRN# [id number]   Age: 39 year old YOB: 1985       GA: 37w2d  GP:   Labor Complications:    EBL:   mL  Delivery QBL: 183 mL  Delivery Type: Vaginal, Spontaneous  ROM to Delivery Time: (Delivered) Hours: 17 Minutes: 39   Weight: 3.11 kg (6 lb 13.7 oz)   1 Minute 5 Minute 10 Minute   Apgar Totals: 7   9        SARITHA TAPIA;MACK AIKEN;DIANE HANSEN    Delivery Details:  Coty Doyle, a 39 year old  female delivered a viable infant with apgars of 7  and 9 . Patient was fully dilated and pushing after 4 hours 10 minutes in active labor. Delivery was via vaginal, spontaneous to a sterile field under epidural anesthesia. Infant delivered in vertex middle occiput anterior position. Anterior and posterior shoulders delivered without difficulty. The cord was clamped, cut twice and 3 vessels were noted. Cord blood was obtained in routine fashion with the following disposition: discard.      Cord complications: nuchal  Placenta delivered at 10/28/2024 11:00 PM. Placental disposition was Patient possesion. Fundal massage performed and fundus found to be firm.     Episiotomy: none   Perineum, vagina, cervix were inspected, and the following lacerations were noted:   Perineal lacerations: 3rd  periurethral laceration: right              Excellent hemostasis was noted. Needle count correct. Infant and patient in delivery room in good and stable condition.        Pattie Doyle, Male-Coty [[id number]]      Labor Event Times      Latent labor onset date/time: 10/28/2024 1200    Active labor onset date: 10/28/24 Onset time:  5:00 PM   Dilation complete date: 10/28/24 Complete time:  9:10 PM   Start pushing date/time: 10/28/2024 2115           Labor Length      1st Stage (hrs): 4 (min): 10   2nd Stage (hrs): 1 (min): 44   3rd Stage (hrs): 0 (min): 6          Labor Events     labor?: No   steroids: None  Labor Type: Augmentation  Predominate monitoring during 1st stage: continuous electronic fetal monitoring     Antibiotics received during labor?: Yes  Reason for Antibiotics: GBS  Antibiotics received for GBS: Penicillin  Antibiotics Given (GBS): Greater than 4 hours prior to delivery       Rupture date/time: 10/28/24 0515   Rupture type: Spontaneous Rupture of Membranes  Fluid color: Clear  Fluid odor: Normal     Induction: Oxytocin  Induction date/time:      Cervical ripening date/time:      Indications for induction: Prelabor ROM       Augmentation date/time: 10/28/24 1200   Indications for augmentation: Prolonged ROM       Delivery/Placenta Date and Time      Delivery Date: 10/28/24 Delivery Time: 10:54 PM   Placenta Date/Time: 10/28/2024 11:00 PM  Oxytocin given at the time of delivery: after delivery of baby  Delivering clinician: Kenia Cisenros MD   Other personnel present at delivery:  Provider Role   Dash Edmondson RN Delivery Nurse   Madie Sam RN Registered Nurse   Diane Jon MD Resident             Vaginal Counts       Initial count performed by 2 team members:  Two Team Members   Dr. Amelia Edmondson RN         Needles Suture Needles Sponges (RETIRED) Instruments   Initial counts 2  5    Added to count  4 5    Relief counts       Final counts 2 4 10            Placed during labor Accounted for at the end of labor   FSE NA NA   IUPC NA NA   Cervidil NA NA                  Final count performed by 2 team members:  Two Team Members   Dr. Amelia Edmondson RN      Final count correct?: Yes  Pre-Birth Team Brief: Complete  Post-Birth Team Debrief: Complete       Apgars    Living status: Living   1 Minute 5 Minute 10 Minute 15 Minute 20 Minute   Skin color: 1  1       Heart rate: 2  2       Reflex  irritability: 2  2       Muscle tone: 1  2       Respiratory effort: 1  2       Total: 7  9       Apgars assigned by: MACK AIKEN RN (1 MIN),IVANNA COHEN       Cord      Vessels: 3 Vessels    Cord Complications: Nuchal   Nuchal Intervention: delivered through         Nuchal cord description: loose nuchal cord         Cord Blood Disposition: Discard    Gases Sent?: No    Delayed cord clamping?: Yes    Cord Clamping Delay (seconds):  seconds    Stem cell collection?: No           [place] Resuscitation    Methods: Oximetry  [place] Care at Delivery: Called by labor RN after vaginal delivery of this term infant due to concern for respiratory distress. NICU team arrived at 5 minutes of age, at which time infant was lying on a warmer, noted to be pink with lusty cry. Saturation probe had been placed on right wrist but was not registering. Lung sounds clear and equal bilaterally, no concern for increased work of breathing once NICU team arrived. HR remained >100. Infant left in delivery room with parents.    Germaine Dodge, DNP, APRN, IVANNA, PNP-PC, CLC   [facility] Prov[facility] - [facility]  2024        [place] Measurements      Weight: 6 lb 13.7 oz           Skin to Skin and Feeding Plan      Skin to skin initiation date/time: 1/10/1841    Skin to skin with: Mother  Skin to skin end date/time:            Labor Events and Shoulder Dystocia    Fetal Tracing Prior to Delivery: Category 2  Shoulder dystocia present?: Neg       Delivery (Maternal) (Provider to Complete) ([id number])    Episiotomy: None  Perineal lacerations: 3rd Repaired?: Yes     Periurethral laceration: right    Repair suture: 2-0 Vicryl, 3-0 Vicryl, 4-0 Vicryl  Genital tract inspection done: Pos       Blood Loss  Mother: Coty Cooney #[id number]     Start of Mother's Information      Delivery Blood Loss   Intrapartum & Postpartum: 10/28/24 1700 - 10/29/24 0014    Delivery Admission: 10/28/24 0618 -  10/29/24 0014         Intrapartum & Postpartum Delivery Admission    Delivery QBL (mL) Hospital Encounter 383 mL 383 mL    Total  383 mL 383 mL               End of Mother's Information  Mother: Coty Cooney #[id number]                Delivery - Provider to Complete ([id number])    Delivering clinician: Kenia Cisneros MD  Delivery Type (Choose the 1 that will go to the Birth History): Vaginal, Spontaneous                         Other personnel:  Provider Role   Dash Edmondson, RN Delivery Nurse   Madie Sam RN Registered Nurse   Diane Jon MD Resident                    Placenta    Date/Time: 10/28/2024 11:00 PM  Removal: Spontaneous  Disposition: Patient possesion             Anesthesia    Method: Epidural  Cervical dilation at placement: 8-10                    Presentation and Position    Presentation: Vertex    Position: Middle Occiput Anterior                     Diane Jon MD    Physician Attestation  I was present for the entire delivery, including baby and placenta as well as perineal laceration repair.    Kenia Cisneros  Date of Service (when I saw the patient): 10/29/24

## 2024-10-29 NOTE — PLAN OF CARE
of viable Male with Dr. Cisneros in attendance. NICU and Nursery RN  present. Infant with spontaneous cry to mothers abdomen, dried and stimulated. Apgars 7/9. Placenta delivered without complications, pitocin bolused, 3a degree laceration, with repairs done. [place] cares provided. Mother and baby in stable condition.

## 2024-10-29 NOTE — PROGRESS NOTES
Data: Coty Doyle transferred to [id number] via wheelchair at 0106. Baby transferred via parent's arms.  Action: Receiving unit notified of transfer: Yes. Patient and family notified of room change. Report given to Mohsen Salazar RN at 0027. Belongings sent to receiving unit. Accompanied by Registered Nurse. Oriented patient to surroundings. Call light within reach. ID bands double-checked with receiving RN.  Response: Patient tolerated transfer and is stable.

## 2024-10-29 NOTE — PROGRESS NOTES
[facility], [place]   Post-partum Note    Name:  Coty Doyle  MRN: [id number]    S: Patient is feeling well overall.  Pain well controlled.  Tolerating regular diet without nausea or vomiting. Ambulating without dizziness to bathroom.  Lochia appropriate. Passing gas.  Attempting Breast feeding, would like to see lactation prior to discharge.  Plans condoms for BC.      O:   Patient Vitals for the past 24 hrs:   BP Temp Temp src Pulse Resp   10/30/24 0244 112/75 97.8  F (36.6  C) Oral 67 16   10/29/24 1939 104/68 98  F (36.7  C) Oral 80 17   10/29/24 1551 119/74 -- -- -- --   10/29/24 1550 -- -- Oral 79 16   10/29/24 1124 120/79 97.8  F (36.6  C) Oral 79 16   10/29/24 0809 116/71 98.3  F (36.8  C) Oral 79 16     Gen:  Resting comfortably, NAD  CV:  RR, well perfused  Pulm:  Breathing comfortably on room air  Abd:  Soft, appropriately ttp, non-distended. Fundus at 1 cm below umbilicus, firm and non-tender.  Ext:  non-tender, no LE edema b/l    I/O last 3 completed shifts:  In: -   Out: 933 [Urine:550; Blood:383]    Hgb:   Hemoglobin   Date Value Ref Range Status   10/29/2024 9.9 (L) 11.7 - 15.7 g/dL Final   07/15/2020 13.4 11.7 - 15.7 g/dL Final       Assessment/Plan:  Coty Doyle 39 year old  on PPD #2 s/p  c/b 3a laceration. Doing well in PP period, working on breast feeding. VSS, had elevated BP < 4h apart in second stage, normotensive since.     #Postpartum management  Pain: Well-controlled with ibuprofen and tylenol  Hgb: 10.8 >  > 9.9, will discharge on PO fe.    Rh: Positive  Rubella: Immune  GI: Schedule bowel regimen ordered.   Feed: Breast  BC: Condoms   PPX:  none, encouraged ambulation, SCDs in bed     # BALBINA  - PTA Zoloft    - discussed warning signs of  mood disorders     #Lichen sclerosis  - Reviewed exam findings during labor course, and discussed recommendation for a follow up exam in post partum period. Patient has been on clobetasol  in the past, but not during this pregnancy. Is asymptomatic.      Medically Ready for Discharge: Anticipated Today  Dispo: discharge today, RTC in 2 weeks for exam of perineal repair       Diane Jon MD PGY2  Obstetrics & Gynecology  10/29/24  6:55 AM     Patient seen and examined by me, agree with above resident note. Doing well and meeting all goals, stable for discharge.  Instructions given.  RTC 2 weeks as scheduled    CANDY GODINEZ MD

## 2024-10-29 NOTE — TELEPHONE ENCOUNTER
Patient sent My[facility] message that she needed to cancel appointment due to delivery of baby. RN encourage her to reach out and let us know if there was anything she needed before then.    Routing update to provider.     Marylin Gallardo RN on 10/29/2024 at 9:21 AM

## 2024-10-29 NOTE — PROGRESS NOTES
"    Anesthesia Post-Partum Follow-Up Note After  with Epidural    Patient: Coty Doyle    Patient location: Post-partum floor.    Anesthesia type: Epidural    Subjective:     She denies weakness, denies paresthesia, denies difficulties breathing or voiding, denies nausea or vomiting, and denies headache. She  does not complain of pruritis at this time. She is able to ambulate.     Objective:    Respiratory Function (RR / SpO2 / Airway Patency): Satisfactory    Cardiac Function (HR / Rhythm / BP): Satisfactory    Strength and sensation lower extremities: Normal    Site of epidural insertion: No signs of infection or inflammation.     Last Vitals: /81 (BP Location: Right arm, Patient Position: Semi-Daniels's, Cuff Size: Adult Regular)   Pulse 80   Temp 36.6  C (97.8  F) (Oral)   Resp 16   Ht 1.791 m (5' 10.5\")   Wt 117.5 kg (259 lb)   LMP 02/10/2024   SpO2 96%   Breastfeeding Unknown   BMI 36.64 kg/m      Assessment and plan:   Coty Doyle is a 39 year old female  post-partum #1 s/p  with epidural for labor analgesia. This successfully provided labor analgesia. The patient delivered via  and the epidural catheter was removed immediately thereafter by the L&D RN with the catheter tip in tact per chart review.     At this time, there is no evidence of adverse side effects associated with the insertion or removal of the epidural catheter. If the patient develops new lower extremity paresis or paresthesias; or if there are concerns regarding the insertion site of the catheter, please reach out to the Dept of Anesthesiology.    Thank you for including us in the care of this patient.    Russell Davidson MD  Anesthesiology Resident, CA-2, PGY-3  "

## 2024-10-29 NOTE — PROGRESS NOTES
"Labor Progress Note     Complete at , pushing since .    /75 (BP Location: Right arm, Patient Position: Semi-Daniels's, Cuff Size: Adult Regular)   Temp 98.1  F (36.7  C) (Oral)   Resp 16   Ht 1.791 m (5' 10.5\")   Wt 117.5 kg (259 lb)   LMP 02/10/2024   SpO2 99%   BMI 36.64 kg/m       FHT: Baseline 120, moderate variability, accelerations present, late/variable decelerations with pushing  TOCO: 5-6 contractions in ten minutes    A/P: 39 year old  at 37w2d, here for IOL following PROM. Adequately treated for GBS. Category 2 due to decelerations with pushing with return to normal heart rate and moderate variability. Anticipate .    Celestine Zapien MD  OB/GYN PGY-3  10/28/2024 10:43 PM    "

## 2024-10-30 ENCOUNTER — MYC MEDICAL ADVICE (OUTPATIENT)
Dept: OBGYN | Facility: CLINIC | Age: 39
End: 2024-10-30
Payer: COMMERCIAL

## 2024-10-30 VITALS
TEMPERATURE: 98.1 F | RESPIRATION RATE: 16 BRPM | OXYGEN SATURATION: 96 % | HEART RATE: 77 BPM | BODY MASS INDEX: 35.34 KG/M2 | HEIGHT: 71 IN | SYSTOLIC BLOOD PRESSURE: 117 MMHG | DIASTOLIC BLOOD PRESSURE: 75 MMHG | WEIGHT: 252.43 LBS

## 2024-10-30 PROCEDURE — 250N000013 HC RX MED GY IP 250 OP 250 PS 637

## 2024-10-30 RX ORDER — FERROUS SULFATE 325(65) MG
325 TABLET ORAL
Qty: 45 TABLET | Refills: 0 | Status: SHIPPED | OUTPATIENT
Start: 2024-10-30

## 2024-10-30 RX ADMIN — POLYETHYLENE GLYCOL 3350 17 G: 17 POWDER, FOR SOLUTION ORAL at 08:19

## 2024-10-30 RX ADMIN — SENNOSIDES 8.6 MG: 8.6 TABLET, FILM COATED ORAL at 08:19

## 2024-10-30 RX ADMIN — ACETAMINOPHEN 650 MG: 325 TABLET, FILM COATED ORAL at 13:20

## 2024-10-30 RX ADMIN — IBUPROFEN 800 MG: 800 TABLET ORAL at 08:19

## 2024-10-30 RX ADMIN — ACETAMINOPHEN 650 MG: 325 TABLET, FILM COATED ORAL at 08:18

## 2024-10-30 ASSESSMENT — ACTIVITIES OF DAILY LIVING (ADL)
ADLS_ACUITY_SCORE: 0

## 2024-10-30 NOTE — DISCHARGE INSTRUCTIONS
"After Your Delivery (the Postpartum Period): Care Instructions  Overview     After childbirth (postpartum period), your body goes through many changes as you recover. In these weeks after delivery, try to take good care of yourself. Get rest whenever you can and accept help from others.  It may take 4 to 6 weeks to feel like yourself again, and possibly longer if you had a  birth. You may feel sore or very tired as you recover. After delivery, you may continue to have contractions as the uterus returns to the size it was before your pregnancy. You will also have some vaginal bleeding. And you may have pain around the vagina as you heal. Several days after delivery you may also have pain and swelling in your breasts as they fill with milk. There are things you can do at home to help ease these discomforts.  After childbirth, it's common to feel emotional. You may feel irritable, cry easily, and feel happy one minute and sad the next. This is called the \"baby blues.\" Hormone changes are one cause of these emotional changes. These feelings usually get better within a couple of weeks. If they don't, talk to your doctor or midwife.  In the first couple of weeks after you give birth, your doctor or midwife may want to check in with you and make a plan for follow-up care. You will likely have a complete postpartum visit in the first 3 months after delivery. At that time, your doctor or midwife will check on your recovery and see how you're doing. But if you have questions or concerns before then, you can always call your doctor or midwife.  Follow-up care is a key part of your treatment and safety. Be sure to make and go to all appointments, and call your doctor if you are having problems. It's also a good idea to know your test results and keep a list of the medicines you take.  How can you care for yourself at home?  Taking care of your body  Use pads instead of tampons for bleeding. After birth, you will have " bloody vaginal discharge. You may also pass some blood clots that shouldn't be bigger than an egg. Over the next 6 weeks or so, your bleeding should decrease a little every day and slowly change to a pinkish and then whitish discharge.  For cramps or mild pain, try an over-the-counter pain medicine, such as acetaminophen (Tylenol) or ibuprofen (Advil, Motrin). Read and follow all instructions on the label.  To ease pain around the vagina or from hemorrhoids:  Put ice or a cold pack on the area for 10 to 20 minutes at a time. Put a thin cloth between the ice and your skin.  Try sitting in a few inches of warm water (sitz bath) when you can or after bowel movements.  Clean yourself with a gentle squeeze of warm water from a bottle instead of wiping with toilet paper.  Use witch hazel or hemorrhoid pads (such as Tucks).  Try using a cold compress for sore and swollen breasts. And wear a supportive bra that fits.  Ease constipation by drinking plenty of fluids and eating high-fiber foods. Ask your doctor or midwife about over-the-counter stool softeners.  Activity  Rest when you can.  Ask for help from family or friends when you need it.  If you can, have another adult in your home for at least 2 or 3 days after birth.  When you feel ready, try to get some exercise every day. For many people, walking is a good choice. Don't do any heavy exercise until your doctor or midwife says it's okay.  Ask your doctor or midwife when it is okay to have vaginal sex.  If you don't want to get pregnant, talk to your doctor or midwife about birth control options. You can get pregnant even before your period returns. Also, you can get pregnant while you are breastfeeding.  Talk to your doctor or midwife if you want to get pregnant again. They can talk to you about when it is safe.  Emotional health  It's normal to have some sadness, anxiety, and mood swings after delivery. It may help to talk with a trusted friend or family member. You  can also call the Maternal Mental Health Hotline at 7-154-TZG-MAMA (1-685.195.9516) for support. If these mood changes last more than a couple of weeks, talk to your doctor or midwife.  When should you call for help?  Share this information with your partner, family, or a friend. They can help you watch for warning signs.  Call 911  anytime you think you may need emergency care. For example, call if:    You feel you cannot stop from hurting yourself, your baby, or someone else.     You passed out (lost consciousness).     You have chest pain, are short of breath, or cough up blood.     You have a seizure.   Where to get help 24 hours a day, 7 days a week   If you or someone you know talks about suicide, self-harm, a mental health crisis, a substance use crisis, or any other kind of emotional distress, get help right away. You can:    Call the Suicide and Crisis Lifeline at 988.     Call 7-163-534-TALK (1-521.805.5349).     Text HOME to 400568 to access the Crisis Text Line.   Consider saving these numbers in your phone.  Go to Dom[employer] for more information or to chat online.  Call your doctor or midwife now or seek immediate medical care if:    You have signs of hemorrhage (too much bleeding), such as:  Heavy vaginal bleeding. This means that you are soaking through one or more pads in an hour. Or you pass blood clots bigger than an egg.  Feeling dizzy or lightheaded, or you feel like you may faint.  Feeling so tired or weak that you cannot do your usual activities.  A fast or irregular heartbeat.  New or worse belly pain.     You have signs of infection, such as:  A fever.  Increased pain, swelling, warmth, or redness from an incision or wound.  Frequent or painful urination or blood in your urine.  Vaginal discharge that smells bad.  New or worse belly pain.     You have symptoms of a blood clot in your leg (called a deep vein thrombosis), such as:  Pain in the calf, back of the knee, thigh, or  "groin.  Swelling in the leg or groin.  A color change on the leg or groin. The skin may be reddish or purplish, depending on your usual skin color.     You have signs of preeclampsia, such as:  Sudden swelling of your face, hands, or feet.  New vision problems (such as dimness, blurring, or seeing spots).  A severe headache.     You have signs of heart failure, such as:  New or increased shortness of breath.  New or worse swelling in your legs, ankles, or feet.  Sudden weight gain, such as more than 2 to 3 pounds in a day or 5 pounds in a week.  Feeling so tired or weak that you cannot do your usual activities.     You had spinal or epidural pain relief and have:  New or worse back pain.  Increased pain, swelling, warmth, or redness at the injection site.  Tingling, weakness, or numbness in your legs or groin.   Watch closely for changes in your health, and be sure to contact your doctor or midwife if:    Your vaginal bleeding isn't decreasing.     You feel sad, anxious, or hopeless for more than a few days.     You are having problems with your breasts or breastfeeding.   Where can you learn more?  Go to https://www.[employer].net/patiented  Enter A461 in the search box to learn more about \"After Your Delivery (the Postpartum Period): Care Instructions.\"  Current as of: July 10, 2023  Content Version: 14.2 2024 [facility].   Care instructions adapted under license by your healthcare professional. If you have questions about a medical condition or this instruction, always ask your healthcare professional. [employer]orporated disclaims any warranty or liability for your use of this information.    Warning Signs after Having a Baby    Keep this paper on your fridge or somewhere else where you can see it.    Call your provider if you have any of these symptoms up to 12 weeks after having your baby.    Thoughts of hurting yourself or your baby  Pain in your chest or trouble breathing  Severe headache " not helped by pain medicine  Eyesight concerns (blurry vision, seeing spots or flashes of light, other changes to eyesight)  Fainting, shaking or other signs of a seizure    Call 9-1-1 if you feel that it is an emergency.     The symptoms below can happen to anyone after giving birth. They can be very serious. Call your provider if you have any of these warning signs.    My provider s phone number: [phone]     Losing too much blood (hemorrhage)    Call your provider if you soak through a pad in less than an hour or pass blood clots bigger than a golf ball. These may be signs that you are bleeding too much.    Blood clots in the legs or lungs    After you give birth, your body naturally clots its blood to help prevent blood loss. Sometimes this increased clotting can happen in other areas of the body, like the legs or lungs. This can block your blood flow and be very dangerous.     Call your provider if you:  Have a red, swollen spot on the back of your leg that is warm or painful when you touch it.   Are coughing up blood.     Infection    Call your provider if you have any of these symptoms:  Fever of 100.4 F (38 C) or higher.  Pain or redness around your stitches if you had an incision.   Any yellow, white, or green fluid coming from places where you had stitches or surgery.    Mood Problems (postpartum depression)    Many people feel sad or have mood changes after having a baby. But for some people, these mood swings are worse.     Call your provider right away if you feel so anxious or nervous that you can't care for yourself or your baby.    Preeclampsia (high blood pressure)    Even if you didn't have high blood pressure when you were pregnant, you are at risk for the high blood pressure disease called preeclampsia. This risk can last up to 12 weeks after giving birth.     Call your provider if you have:   Pain on your right side under your rib cage  Sudden swelling in the hands and face    Remember: You  know your body. If something doesn't feel right, get medical help.     For informational purposes only. Not to replace the advice of your health care provider. Copyright 2020 [facility]. All rights reserved. Clinically reviewed by Carlita Chavez, RNC-OB, MSN. [employer]box 605029 - Rev 02/23.     Breastfeeding Your Baby (02:46)  Your health professional recommends that you watch this short online health video.  Here's a step-by-step guide to the basics of breastfeeding.   Purpose: Shows the cradle hold and other methods to demonstrate a step-by-step guide for breastfeeding.  Goal: Viewers will learn the basics of breastfeeding with this step-by-step guide showing the cradle hold.    Watch: Scan the QR code or visit the link to view video       [email or web address]  Current as of: July 10, 2023  Content Version: 14.2 2024 [employer].   Care instructions adapted under license by your healthcare professional. If you have questions about a medical condition or this instruction, always ask your healthcare professional. [employer] disclaims any warranty or liability for your use of this information.   5 Things You Can Expect With a New Baby (02:13)  Your health professional recommends that you watch this short online health video.  Learn five things you can expect when bringing a new baby home.   Purpose: Explains what to expect with a new baby, including sleeping schedules, feeding, diaper changing, crying, and bonding.  Goal: The user will learn what to expect when bringing a new baby home.    Watch: Scan the QR code or visit the link to view video       [email or web address]  Current as of: October 24, 2023  Content Version: 14.2 2024 [employer].   Care instructions adapted under license by your healthcare professional. If you have questions about a medical condition or this instruction, always ask your healthcare professional. [employer] disclaims any warranty or liability for your use of this information.  How to Breastfeed: Step by Step  Breastfeeding is a skill that can get better with practice. Breastfeed your baby whenever they're hungry. Offer both breasts to your baby at each feeding. In the first 2 weeks, your baby will feed at least 8 times in a 24-hour period.  Talk to your doctor, midwife, or lactation consultant if your baby or you are having trouble breastfeeding. Support can also come from a trusted friend or family member who knows how to breastfeed.    Get ready. Find a place to sit where you feel relaxed and comfortable. Make sure your back is supported. Try using a pillow on your lap to support your baby.   Try supporting your breast with one hand.   U hold: Your thumb is on the outer side of your breast. Your fingers are on the inner side.  C hold: Your thumb is above the darker area around the nipple (areola). Your fingers are below.     Use your other hand and arm to hold your baby. Support the base of their head.   Try different positions if you need to. Find what works for you and your baby. Different holds include cradle, cross-cradle, football, Australian, laid back, and side-lying.     Help your baby latch. Touch your baby's lower lip with your nipple. Wait until your baby's mouth opens wide. Bring your baby quickly to your breast, and guide your breast into their mouth.   Look for a good latch. Make sure that your nipple and much of your areola are in your baby's mouth. Your baby's lips are flared out, not folded in.     Listen for regular sucking and swallowing sounds. If you can't see or hear swallowing, watch your baby's ears. They'll wiggle slightly when your baby swallows.   Break the latch if you need to. Put one finger in the corner of your baby's mouth between your breast and your baby's gums. This helps prevent cracking and painful nipples.   Where can you learn more?  Go to  "https://www.healthBizen.net/patiented  Enter V691 in the search box to learn more about \"How to Breastfeed: Step by Step.\"  Current as of: July 10, 2023  Content Version: 14.2 2024 [facility] [employer], [facility].   Care instructions adapted under license by your healthcare professional. If you have questions about a medical condition or this instruction, always ask your healthcare professional. Healthwise, Incorporated disclaims any warranty or liability for your use of this information.  "

## 2024-10-30 NOTE — PLAN OF CARE
Problem: Postpartum (Vaginal Delivery)  Goal: Successful Parent Role Transition  Outcome: Progressing  Intervention: Support Parent Role Transition  Recent Flowsheet Documentation  Taken 10/29/2024 1550 by Day Antunez RN  Supportive Measures:   active listening utilized   counseling provided   decision-making supported   goal-setting facilitated  Parent-Child Attachment Promotion:   caring behavior modeled   rooming-in promoted  Goal: Hemostasis  Outcome: Progressing  Goal: Optimal Pain Control and Function  Outcome: Progressing     Problem: Postpartum (Vaginal Delivery)  Goal: Effective Urinary Elimination  Outcome: Met     Assessment complete and WDL. VSS. Pt up ad rd, voiding WDL. Pt taking tylenol and ibuprofen for pain. Pt encouraged to hand express this evening as infant is supplementing with donor milk. Pt is willing to start pumping tomorrow but declined starting today. Continue POC.

## 2024-10-30 NOTE — TELEPHONE ENCOUNTER
Sunday-Please review and advise on baby formula questions.  Writer is not familiar with goat-milk based formula options.    Thank you!  MADDIE RoseN, RN-[facility]

## 2024-10-30 NOTE — LACTATION NOTE
This note was copied from a baby's chart.  Follow Up Consult    Infant Name: Juliette    Infant's Primary Care Clinic: [facility]    Maternal Assessment: Coty is feeling ready to discharge home today. She has been attempting hand expression but has not yet gotten any drops and asks about timeline of when milk should come in.      Infant Assessment:  Juliette has age appropriate output and weight loss.      Weight Change Since Birth: -4% at 2 day old      Feeding History: Coty shares that Juliette has had a few good feedings overnight where he was more alert, but she is not sure how much he is getting yet as she has not seen any milk in the nipple shield. She had given him donor milk supplementation twice yesterday.     Feeding Assessment: On arrival to room, Juliette was held skin to skin, asleep on Coty's chest. We practiced football hold as family had questions about positioning and we attempted to latch him, however he would not open his mouth to latch at this time. Symphony pump was brought into the room and Coty was assisted to pump. She was able to get a drop of colostrum with pumping and another drop with hand expression.    Education:   [x] Expected  feeding patterns in the first few days (pg. 38 of Your Guide to To Postpartum and [place] Care)/ the Second Night  [x] Stages of milk production  [x] Benefits of hand expression of colostrum   [x] Benefits of feeding on cue  [x] Benefits of skin to skin  [x] Breastfeeding positions  [x] How to tell if baby is getting enough  [x] Expected  output  [x] [place] weight loss  [x] Infant Feeding Log  [x] Signs breastfeeding is going well (comfortable latch, audible swallows, age appropriate output and weight loss)    [x] Pumping recommendations (based on patient need)  [x] Inpatient breastfeeding support  [x] Outpatient lactation resources    Handouts: Infant Feeding Log (Week 1, Your Guide to Postpartum & [place] Care Book), [facility]  "Lactation Resources, Pasteurized Donor Human Milk, and Using Donor Milk for Your Baby at Home    Home Breast Pump: Synergy Gold    Plan (Early Term): Frequent skin to skin, watch for early feeding cues and breastfeed on cue with goal of 8 to 12 feedings in 24 hours. Go no longer than 3 hours between feedings. Encourage breast compressions to enhance milk transfer when infant at the breast.    Encourage hand expression after feedings until milk is in, and hands on pumping 4-6 times daily to support \"full term\" supply. Supplement after breastfeeding with any expressed milk. If supplementing with donor milk or formula, encourage pumping each time supplemental milk is given.    Follow up with outpatient lactation consultant within a week of discharge for support with early term infant, and weaning from pumping after supply established. Reviewed outpatient lactation resources with family.       Sagrario Wolf RN, IBCLC   Lactation Consultant  [place]: [facility] [phone]  Office: [phone]            "

## 2024-10-30 NOTE — PLAN OF CARE
"Goal Outcome Evaluation:      Plan of Care Reviewed With: patient, spouse    Overall Patient Progress: improvingOverall Patient Progress: improving    VSS and postpartum assessments WDL.  Up ad rd with steady gait and independent with cares.  Bonding well with infant.  Breastfeeding on cue with minimal assistance and nipple shields.  Pain managed with Tylenol and Ibuprofen.  Spouse, Sadiq present and supportive.  Will continue with postpartum cares and education per plan of care.       Problem: Adult Inpatient Plan of Care  Goal: Plan of Care Review  Description: The Plan of Care Review/Shift note should be completed every shift.  The Outcome Evaluation is a brief statement about your assessment that the patient is improving, declining, or no change.  This information will be displayed automatically on your shift  note.  Outcome: Progressing  Flowsheets (Taken 10/30/2024 0659)  Plan of Care Reviewed With:   patient   spouse  Overall Patient Progress: improving  Goal: Patient-Specific Goal (Individualized)  Description: You can add care plan individualizations to a care plan. Examples of Individualization might be:  \"Parent requests to be called daily at 9am for status\", \"I have a hard time hearing out of my right ear\", or \"Do not touch me to wake me up as it startles  me\".  Outcome: Progressing  Goal: Absence of Hospital-Acquired Illness or Injury  Outcome: Progressing  Intervention: Prevent Skin Injury  Recent Flowsheet Documentation  Taken 10/29/2024 1939 by Caitie Cai, RN  Body Position: position changed independently  Intervention: Prevent and Manage VTE (Venous Thromboembolism) Risk  Recent Flowsheet Documentation  Taken 10/29/2024 1939 by Caitie Cai, RN  VTE Prevention/Management: (pt ambulating) --  Intervention: Prevent Infection  Recent Flowsheet Documentation  Taken 10/29/2024 1939 by Caitie Cai, RN  Infection Prevention:   equipment surfaces disinfected   hand hygiene promoted   " personal protective equipment utilized   rest/sleep promoted  Goal: Optimal Comfort and Wellbeing  Outcome: Progressing  Intervention: Provide Person-Centered Care  Recent Flowsheet Documentation  Taken 10/29/2024 1939 by Caitie Cai RN  Trust Relationship/Rapport:   care explained   choices provided   emotional support provided   empathic listening provided   questions answered   questions encouraged   reassurance provided   thoughts/feelings acknowledged  Goal: Readiness for Transition of Care  Outcome: Progressing     Problem: Postpartum (Vaginal Delivery)  Goal: Successful Parent Role Transition  Outcome: Progressing  Intervention: Support Parent Role Transition  Recent Flowsheet Documentation  Taken 10/29/2024 1939 by Caitie Cai, RN  Supportive Measures:   active listening utilized   decision-making supported   goal-setting facilitated   positive reinforcement provided   self-care encouraged   verbalization of feelings encouraged  Parent-Child Attachment Promotion:   caring behavior modeled   rooming-in promoted   cue recognition promoted   interaction encouraged   face-to-face positioning promoted   parent/caregiver presence encouraged   participation in care promoted   positive reinforcement provided   skin-to-skin contact encouraged   strengths emphasized  Goal: Hemostasis  Outcome: Progressing  Goal: Absence of Infection Signs and Symptoms  Outcome: Progressing  Intervention: Prevent or Manage Infection  Recent Flowsheet Documentation  Taken 10/29/2024 1939 by Caitie Cai, RN  Infection Management: aseptic technique maintained  Goal: Optimal Pain Control and Function  Outcome: Progressing

## 2024-11-06 PROBLEM — D64.9 ANEMIA: Status: ACTIVE | Noted: 2024-08-06

## 2024-11-06 ASSESSMENT — EDINBURGH POSTNATAL DEPRESSION SCALE (EPDS)
I HAVE FELT SCARED OR PANICKY FOR NO GOOD REASON: YES, SOMETIMES
I HAVE BEEN ANXIOUS OR WORRIED FOR NO GOOD REASON: YES, VERY OFTEN
TOTAL SCORE: 12
I HAVE LOOKED FORWARD WITH ENJOYMENT TO THINGS: AS MUCH AS I EVER DID
THE THOUGHT OF HARMING MYSELF HAS OCCURRED TO ME: NEVER
I HAVE BEEN SO UNHAPPY THAT I HAVE BEEN CRYING: ONLY OCCASIONALLY
I HAVE BEEN SO UNHAPPY THAT I HAVE HAD DIFFICULTY SLEEPING: YES, SOMETIMES
I HAVE BLAMED MYSELF UNNECESSARILY WHEN THINGS WENT WRONG: NOT VERY OFTEN
I HAVE BEEN ABLE TO LAUGH AND SEE THE FUNNY SIDE OF THINGS: AS MUCH AS I ALWAYS COULD
THINGS HAVE BEEN GETTING ON TOP OF ME: YES, SOMETIMES I HAVEN'T BEEN COPING AS WELL AS USUAL
I HAVE FELT SAD OR MISERABLE: NOT VERY OFTEN

## 2024-11-06 NOTE — PROGRESS NOTES
"Assessment:    Ten day old infant gaining weight well on breastfeeding with supplementation: back to birthweight today  Difficulty grasping breast for deep latch:  baby with very tight jaw and mother with short-shafted, retractile nipples.  Able to latch to breast with use of nipple shield today.  Baby with good suck but no measurable milk transfer: in need of continued supplementation  Mother with low milk supply    Plan:   Use good positioning for deep latch, with baby held close to body and baby's head/shoulders/hips in good alignment.  When in a seated position, use a pillow to help bring baby close to breasts, and stepstool to elevate your knees above hips.    When bringing your baby to your breast, compress your breast vertically and in line with baby's mouth--this will help them to get a larger mouthful of breast and a deeper latch. Babies latch best to the breast by bringing their chin in first, so point your nipple towards baby's nose, tuck the chin in close, and then wait for his mouth to open.  When his mouth opens, bring his head in deeply.  Baby's chin should be snugged deeply in your breast, their upper cheeks should be touching the breast, and their nose just out of the breast.   If your baby is struggling with latch and is unable to grasp your breast after several tries, consider adding the nipple shield.  Try to use this before baby gets distressed and frantic, because the feeding will go more easily--feeding should not be difficult and frustrating.  Position it with the cut-out where baby's nose will land, and turn it inside out a bit while applying so that your nipple is drawn deeply into the shield.  If your baby is still seeming frustrated, you can fill the shield with some milk using hand expression, to provide them with an \"instant reward\" and encourage continued suckling.  Just use the shield if it is needed;  It may be needed some times and not others, or on one breast and not the other.  " "Once he is doing well, you can work towards weaning from it completely.      Continue to nurse baby on cue, 8-12 times each day.  Now that your baby has returned to their birthweight, you can trust them to awaken when they need to eat--you do not need to awaken them on a schedule.  Watch closely to see if Juliette is actively drinking and swallowing when at your breast--if he is staking in milk well, you will hear soft swallowing sounds and see his throat drop down (like a bullfrog).  When you nurse, feed on one side until baby finishes swallowing.  Once swallowing slows, use breast compression to encourage more intake, but once baby is sleeping, coming off the breast, or just \"nibbling,\" you can take baby off the breast and move to the other side.  Offer both breasts at each feeding.     Juliette needs about 18 oz of milk each day to grow well, or about 2 oz each feeding if he is eating about 8 times/day.  Continue to supplement him as you have been, using your expressed breastmilk when possible and formula if the pumped milk is gone.  Once babies are about a month old, they need about 25-30 oz/day (or 3- 4 oz each feeding if they eat about 8 times/day) and this where their needs stay for their entire first year;  you don't need to keep producing more and more milk as they grow.    Continue pumping as often as you are able, to have this milk to offer to Juliette and to encourage a strong milk supply.  Sometimes some warmth on your breasts (like a heating pad or microwaved hot pack) is helpful, and gentle massage can also help release more milk.   It is more effective to pump more frequently for shorter time periods than it is to pump less often for longer:  For example, it is better to pump 6 times/day for 15 minutes each than it is to pump 3 times/day for 30 minutes.     A \"triple feeding\" plan like this--nurse, supplement, pump--is very difficult, exhausting, and not sustainable over the long term.   The goal is to do " it for a short time in order to help Juliette gain weight well, be more energetic and effective at the breast, and increase your milk supply so that you can give him more of your milk and less formula.   It is important to re-evaluate the plan in 1-2 weeks to see if it has been effective and make sure you have a comfortable feeding pattern going forward.   Follow up with lactation in 2 weeks, and pediatric provider as planned.  [employer] can be used for brief questions, but it's important to know that messages are not seen Friday through Sunday. If urgent help is needed, Monday through Friday you can call [phone] and one of our lactation consultants will get the message and respond; if you need a rapid response over a weekend or holiday, it is best to call your on-call maternity or pediatric provider.  Please feel free to schedule a return visit if the concern is more detailed;  telephone visits are also an option if you don't feel you need to be seen in person.     Subjective: Coty is here today accompanied by her  Sadiq and mother Renu because of difficulty latching baby Juliette--states that baby often will not sustain a latch.  She has been using a nipple shield and this is helpful, as Juliette will sustain latch better, but finds this inconvenient to use.  She is offering the breast 8 - 10 times/day, and they supplement with about 2 oz of expressed milk or formula 4-5 times/day.  Using Spectra Synergy.  She is pumping 2-3 times/day using the Spectra Synergy pump, releasing about 1 oz/session.  She feels she has just started to notice some breast filling in the last day or two, more on the left than on the right.  Wondering how to increase supply on the right side.     She is vaccinated for Covid-19, with most recent dose 9/23/24.    Hospital Course: Augmentation for PROM;  birth complicated by 3rd degree laceration, sulcal and periclitoral tears.  Baby initially sleepy and bottlefed shortly after delivery.   Seen by hospital IBCLC and baby nursing some with shield but very sleepy;  Coty pumping but with minimal success.  Having significant anxiety.    Mother's Relevant Med/Surg History: Anxiety on Zoloft;  headaches;  IBS.  No irregular cycles but did 8 months to conceive.      Breastfeeding Goals: direct breastfeeding    Previous Breastfeeding Experience: first baby    Infant's name: Juliette Jeffery  Infant's bday: [birth year]  Gestational age: 37w2d  Infant's birth weight: 6 # 13.7 oz   Discharge weight: 6 # 8.9 oz     Pediatric Provider: [facility], Sunday Avilez PA-C  Recent weights:  10/31/24:  6 # 2.8 oz    Peq Lactation Visit Questionnaire    11/5/2024  4:46 PM CST - Filed by Patient   What is your main concern today? Laggung right breast, latching issues, formuka supplement   Your baby's first name: Juliette   Your baby's last name: Latia   Baby's most recent weight: 2.8 kg   Date: 10/31/24   Since your last visit, have you had any new medical problems or surgeries? Yes   Please explain: Baby showung sign of jaundace   How often does your baby eat? Every 2-3 hours   How long does each feeding last?  5-20 min   How much of the time does your baby take both breasts when nursing? 20   Can you hear the baby swallowing during nursing? Yes   How many times does your baby feed in 24 hours? 8   How many times does your baby urinate (pee) in 24 hours? 6   How many stools (poops) does your baby have in 24 hours? 3   Describe the color and consistency of the poop: Lighter brown   Do you give your baby extra milk in addition to or instead of breastfeeding? Formula   How much extra do you usually give? 2.5 oz   How do you give extra milk? Bottle   Are you pumping your breasts? Yes   How often? 2-3 times per day   How much is pumped? 1.5 oz   What else would you like the lactation consultant to know? Slow let down, large breasts       Objective/Physical exam:   Her nipples are very short shafted and  "retractile, the areola is compressible, the breast is soft and pendulous.    Sore nipples: no   EPDS: 12, with \"never\" marked for question on thoughts of self-harm.  Coty does report anxiety that she feels may be interfering with her milk letdown;  has a therapist that she sees regularly and finds this helpful.  Feels that current dose of Zoloft is adequate.    Assessment of infant: 10.57% Weight for age percentile   Age today: 10 days  Today's weight: 6 # 13.2 oz  Amount of milk transferred from LEFT side: none measurable  Amount of milk transferred from RIGHT side: none measurable    Baby has full flexion of arms and legs, normal tone, behavior is alert and active, respirations are normal, skin is normal, hydration is normal, jaw is normal size and alignment but with significant muscle tightness, palate is normal, frenulum is normal, baby can lateralize tongue, has adequate tongue lift, and tongue can protrude past bottom gum line. Upper labial frenulum is normal.    Suck exam:  Baby has very tight jaw and did not easily admit examining finger.  Once he did allow examining finger into mouth, had strong, coordinated suck with normal tongue cupping, but with very tight latch.    Baby thrush: none    Jaundice: none      Feeding assessment: Baby was not able to grasp the breast with a deep latch; would briefly take nipple into mouth but did not hold suction.  After several attempts applied nipple shield, and with significant positional guidance Coty was able to hold Haruki comfortably and he did sustain a latch.       Alignment: The baby was flex relaxed. Baby's head was aligned with its trunk. Baby did face mother. Baby was in cross cradle and football positions today.   Areolar Grasp: Baby was not able to easily open mouth widely. Baby's lips were not pursed. Baby's lips did flange outward. Tongue was visible over bottom gum. Baby had complete seal.     Areolar Compression: Baby made rhythmic motion but little to no " nutritive-type sucks. There were no clicking or smacking sounds. There was no severe nipple discomfort. Nipples appeared rounded after feeding.  Audible swallowing: Baby made no noted quiet sounds of swallowing: There was no increase in frequency after milk ejection reflex. The milk ejection reflex is indeterminate and milk supply is low.     BP 96/68 (BP Location: Left arm, Patient Position: Sitting, Cuff Size: Adult Large)   Pulse 92   LMP 02/10/2024   Breastfeeding Yes   OB History    Para Term  AB Living   1 1 1 0 0 1   SAB IAB Ectopic Multiple Live Births   0 0 0 0 1      # Outcome Date GA Lbr Dustin/2nd Weight Sex Type Anes PTL Lv   1 Term 10/28/24 37w2d 04:10 / 01:44 3.11 kg (6 lb 13.7 oz) M Vag-Spont EPI N DEBBI      Name: Juliette Doyle      Apgar1: 7  Apgar5: 9       Current Outpatient Medications:     acetaminophen (TYLENOL) 325 MG tablet, Take 2 tablets (650 mg) by mouth every 6 hours as needed for mild pain. Start after Delivery., Disp: , Rfl:     albuterol (PROAIR HFA/PROVENTIL HFA/VENTOLIN HFA) 108 (90 BASE) MCG/ACT Inhaler, Inhale 2 puffs into the lungs as needed for shortness of breath or wheezing, Disp: , Rfl:     cyanocobalamin (VITAMIN B-12) 500 MCG SUBL sublingual tablet, , Disp: , Rfl:     ferrous sulfate (FEROSUL) 325 (65 Fe) MG tablet, Take 1 tablet (325 mg) by mouth daily (with breakfast)., Disp: 45 tablet, Rfl: 0    ibuprofen (ADVIL/MOTRIN) 200 MG tablet, Take 3 tablets (600 mg) by mouth every 6 hours as needed for moderate pain. Start after delivery, Disp: , Rfl:     polyethylene glycol (MIRALAX) 17 GM/Dose powder, Take 17 g (1 Capful) by mouth daily., Disp: 510 g, Rfl: 0    Prenatal Vit-DSS-Fe Cbn-FA (PRENATAL AD PO), , Disp: , Rfl:     senna-docusate (SENOKOT-S/PERICOLACE) 8.6-50 MG tablet, Take 2 tablets by mouth daily. Start after delivery., Disp: , Rfl:     sertraline (ZOLOFT) 50 MG tablet, Take 1 tablet (50 mg) by mouth daily, Disp: 90 tablet, Rfl: 3    Vitamin  D, Cholecalciferol, 25 MCG (1000 UT) CAPS, , Disp: , Rfl:     Iron-Vitamin C (IRON 100/C) 100-250 MG TABS, , Disp: , Rfl:   Past Medical History:   Diagnosis Date    CARDIOVASCULAR SCREENING; LDL GOAL LESS THAN 160 04/17/2012    Contraceptive management 04/18/2012    History of COVID-19     Jaw pain 12/31/2021    Nonintractable episodic headache, unspecified headache type 01/01/2022    Plantar warts 07/06/2012    Uncomplicated asthma      Past Surgical History:   Procedure Laterality Date    MANDIBLE SURGERY  08/2004    ORTHOPEDIC SURGERY  2004    Jaw surgery    PE TUBES      as child.     Family History   Problem Relation Age of Onset    Osteoporosis Mother     Varicose Veins Mother     Hypertension Father     Allergies Father     Eye Disorder Father         Glasses    Thyroid Disease Maternal Grandmother     Heart Disease Maternal Grandfather         Pace Maker    Diabetes Maternal Grandfather         Type 2 late in life    Prostate Cancer Maternal Grandfather         Prostate    Eye Disorder Maternal Grandfather         Glasses    Hypertension Paternal Grandfather         Recently passed in December 2021    Anxiety Disorder Sister     Anxiety Disorder Sister     Thyroid Disease Other         Maternal aunt    Obesity Other         Maternal cousin    Cancer Maternal Aunt     Coronary Artery Disease No family hx of        Time spent:  Chart review/precharting: 10 min prior to day of service  Face-to-face visit:   57 min   Documentation:  15 min   Total time spent on day of service: 72 min    Wendy Ramirez, PALOMA, CNM, IBCLC

## 2024-11-07 ENCOUNTER — OFFICE VISIT (OUTPATIENT)
Dept: MIDWIFE SERVICES | Facility: CLINIC | Age: 39
End: 2024-11-07
Payer: COMMERCIAL

## 2024-11-07 VITALS — HEART RATE: 92 BPM | DIASTOLIC BLOOD PRESSURE: 68 MMHG | SYSTOLIC BLOOD PRESSURE: 96 MMHG

## 2024-11-07 DIAGNOSIS — O92.79 OTHER DISORDERS OF LACTATION: Primary | ICD-10-CM

## 2024-11-07 PROCEDURE — 99215 OFFICE O/P EST HI 40 MIN: CPT | Performed by: ADVANCED PRACTICE MIDWIFE

## 2024-11-07 PROCEDURE — 99417 PROLNG OP E/M EACH 15 MIN: CPT | Performed by: ADVANCED PRACTICE MIDWIFE

## 2024-11-07 NOTE — PATIENT INSTRUCTIONS
"   Use good positioning for deep latch, with baby held close to body and baby's head/shoulders/hips in good alignment.  When in a seated position, use a pillow to help bring baby close to breasts, and stepstool to elevate your knees above hips.    When bringing your baby to your breast, compress your breast vertically and in line with baby's mouth--this will help them to get a larger mouthful of breast and a deeper latch. Babies latch best to the breast by bringing their chin in first, so point your nipple towards baby's nose, tuck the chin in close, and then wait for his mouth to open.  When his mouth opens, bring his head in deeply.  Baby's chin should be snugged deeply in your breast, their upper cheeks should be touching the breast, and their nose just out of the breast.   If your baby is struggling with latch and is unable to grasp your breast after several tries, consider adding the nipple shield.  Try to use this before baby gets distressed and frantic, because the feeding will go more easily--feeding should not be difficult and frustrating.  Position it with the cut-out where baby's nose will land, and turn it inside out a bit while applying so that your nipple is drawn deeply into the shield.  If your baby is still seeming frustrated, you can fill the shield with some milk using hand expression, to provide them with an \"instant reward\" and encourage continued suckling.  Just use the shield if it is needed;  It may be needed some times and not others, or on one breast and not the other.  Once he is doing well, you can work towards weaning from it completely.      Continue to nurse baby on cue, 8-12 times each day.  Now that your baby has returned to their birthweight, you can trust them to awaken when they need to eat--you do not need to awaken them on a schedule.  Watch closely to see if Juliette is actively drinking and swallowing when at your breast--if he is staking in milk well, you will hear soft " "swallowing sounds and see his throat drop down (like a bullfrog).  When you nurse, feed on one side until baby finishes swallowing.  Once swallowing slows, use breast compression to encourage more intake, but once baby is sleeping, coming off the breast, or just \"nibbling,\" you can take baby off the breast and move to the other side.  Offer both breasts at each feeding.     Juliette needs about 18 oz of milk each day to grow well, or about 2 oz each feeding if he is eating about 8 times/day.  Continue to supplement him as you have been, using your expressed breastmilk when possible and formula if the pumped milk is gone.  Once babies are about a month old, they need about 25-30 oz/day (or 3- 4 oz each feeding if they eat about 8 times/day) and this where their needs stay for their entire first year;  you don't need to keep producing more and more milk as they grow.    Continue pumping as often as you are able, to have this milk to offer to Juliette and to encourage a strong milk supply.  Sometimes some warmth on your breasts (like a heating pad or microwaved hot pack) is helpful, and gentle massage can also help release more milk.   It is more effective to pump more frequently for shorter time periods than it is to pump less often for longer:  For example, it is better to pump 6 times/day for 15 minutes each than it is to pump 3 times/day for 30 minutes.     A \"triple feeding\" plan like this--nurse, supplement, pump--is very difficult, exhausting, and not sustainable over the long term.   The goal is to do it for a short time in order to help Juliette gain weight well, be more energetic and effective at the breast, and increase your milk supply so that you can give him more of your milk and less formula.   It is important to re-evaluate the plan in 1-2 weeks to see if it has been effective and make sure you have a comfortable feeding pattern going forward.   Follow up with lactation in 2 weeks, and pediatric provider as " planned.  [employer] can be used for brief questions, but it's important to know that messages are not seen Friday through . If urgent help is needed, Monday through Friday you can call [phone] and one of our lactation consultants will get the message and respond; if you need a rapid response over a weekend or holiday, it is best to call your on-call maternity or pediatric provider.  Please feel free to schedule a return visit if the concern is more detailed;  telephone visits are also an option if you don't feel you need to be seen in person.   ______________  Gas is very normal and common in young babies, and does not mean that you are doing something wrong with feeding.  Give him the opportunity to burp partway through the feeding, especially if using bottles, but there is nothing further you need to do.  They just need to become accustomed to the feelings of gas moving in their bodies!  Hiccups are similar--they are part of the 's immature system and not due to diet or feeding practices.  Few babies are sensitive to foods we encounter on a daily basis, so you do not need to limit your diet.  Food sensitivities are not terribly common, and babies will often show other signs of a problem, such as eczema, spitting up very large volumes of milk/weight loss, and/or blood in the poop.      Good article on gassiness in babies:    When your baby is very gassy  https://[employer]/parenting/parenting-faq/gassybaby/          _________________________  This is a great video that shows a baby nursing well at the breast, and how to tell when baby is actively swallowing:      Is Your Baby Getting Enough Milk?   https://globalhealthmedia.org/videos/is-your-baby-getting-enough-milk/       ___________    Good breastfeeding resources:    Websites:  www.[employer]  www.[employer]/blog/  www.llli.org/breastfeeding-info/    Instagram:    [email or web address]  [email or web address]    Books:   The Womanly Art of  Breastfeeding by [employer]  Breastfeeding Doesn't Need to Suck, by Rosa Ecekrt      For help with using baby carriers:  https://babywearingtwincities.org/

## 2024-11-11 ENCOUNTER — PRENATAL OFFICE VISIT (OUTPATIENT)
Dept: OBGYN | Facility: CLINIC | Age: 39
End: 2024-11-11
Payer: COMMERCIAL

## 2024-11-11 VITALS
HEART RATE: 91 BPM | TEMPERATURE: 98 F | OXYGEN SATURATION: 98 % | WEIGHT: 242.3 LBS | DIASTOLIC BLOOD PRESSURE: 74 MMHG | SYSTOLIC BLOOD PRESSURE: 136 MMHG | BODY MASS INDEX: 34.28 KG/M2

## 2024-11-11 PROCEDURE — 99213 OFFICE O/P EST LOW 20 MIN: CPT | Performed by: OBSTETRICS & GYNECOLOGY

## 2024-11-11 NOTE — PROGRESS NOTES
[facility]- [facility]    CC: laceration check    S:Coty Doyle is a 39 year old [id number] s/p  on 10/28/24 notable for 3a laceration, bilateral sulcal tears, and right periurethral laceration repaired with 2 simple interrupted stitches who presents today for 2 week laceration check  Patient reports she has had a little cramping that she treats with tylenol and ibuprofen about 1x per day.  She is rates her pain as a 0-3/10.  She is using a mikal bottle, which helps to reduce burning during voiding.  She reports bleeding is light.  She is having soft stools, but does not feel like she is having a complete bowel movement when she voids.  She states baby is doing well.  She is getting a little sleep.  She feels mood was up and down week 1, but the second week postpartum was much better.  Intermittent tension headaches.  Intermittent hand numbness.    O: Patient Vitals for the past 24 hrs:   BP Temp Temp src Pulse SpO2 Weight   24 0833 136/74 -- -- -- -- --   24 0808 (!) 143/82 98  F (36.7  C) Oral 91 98 % 109.9 kg (242 lb 4.8 oz)   ]  Exam:  General- awake, alert, answering questions appropriately, appears comfortable  CV- regular rate  Lung- breathing comfortably on room air  - periurethral laceration repair is intact and healing well.  Will not remove stitches today.  3rd degree laceration repair healing well.   Ext- bilateral lower extremities with no edema      A&P: Coty Doyle is a 39 year old [id number] s/p  on 10/28/24 notable for 3a laceration, bilateral sulcal tears, and right periurethral laceration repaired with 2 simple interrupted stitches who presents today for 2 week laceration check.    (Z39.2) Routine postpartum follow-up  (primary encounter diagnosis)  Comment: Overall doing well.  Discussed initial BP elevated, but she was not sitting and resting prior to it being taken.  She denies any chest pain, chest pressure, shortness of breath, or RUQ pain.  She  has home BP cuff.  Reviewed that her hand numbness is likely due to carpal tunnel and headaches sound characteristic of tension headaches.  Encourage PO hydration, optimization of posture and position with breast feeding, and heating pads on the back of her neck. Patient with history of cervical polyp.    Plan: routine 6 week pp visit.  Plan for pelvic exam and evaluation of cervical polyp at 6 week pp visit.     (O70.21) Type 3a perineal laceration  Comment: discussed continued bowel regimen.  She is currently taking colace.   Plan:  Suggest switching to senna-s and miralax given she does not feel she is completely emptying her bowels.     Sil Dmuont MD

## 2024-11-12 ENCOUNTER — MYC MEDICAL ADVICE (OUTPATIENT)
Dept: OBGYN | Facility: CLINIC | Age: 39
End: 2024-11-12
Payer: COMMERCIAL

## 2024-11-12 ENCOUNTER — PATIENT OUTREACH (OUTPATIENT)
Dept: CARE COORDINATION | Facility: CLINIC | Age: 39
End: 2024-11-12
Payer: COMMERCIAL

## 2024-11-18 PROBLEM — Z23 NEED FOR TDAP VACCINATION: Status: RESOLVED | Noted: 2024-03-28 | Resolved: 2024-11-18

## 2024-11-19 ENCOUNTER — MYC MEDICAL ADVICE (OUTPATIENT)
Dept: OBGYN | Facility: CLINIC | Age: 39
End: 2024-11-19
Payer: COMMERCIAL

## 2024-11-19 ENCOUNTER — MYC MEDICAL ADVICE (OUTPATIENT)
Dept: FAMILY MEDICINE | Facility: CLINIC | Age: 39
End: 2024-11-19
Payer: COMMERCIAL

## 2024-11-19 ASSESSMENT — EDINBURGH POSTNATAL DEPRESSION SCALE (EPDS)
THE THOUGHT OF HARMING MYSELF HAS OCCURRED TO ME: NEVER
I HAVE BEEN SO UNHAPPY THAT I HAVE BEEN CRYING: ONLY OCCASIONALLY
I HAVE FELT SAD OR MISERABLE: NOT VERY OFTEN
I HAVE BEEN ANXIOUS OR WORRIED FOR NO GOOD REASON: YES, SOMETIMES
I HAVE BLAMED MYSELF UNNECESSARILY WHEN THINGS WENT WRONG: YES, SOME OF THE TIME
I HAVE BEEN ABLE TO LAUGH AND SEE THE FUNNY SIDE OF THINGS: AS MUCH AS I ALWAYS COULD
I HAVE FELT SCARED OR PANICKY FOR NO GOOD REASON: YES, SOMETIMES
THINGS HAVE BEEN GETTING ON TOP OF ME: YES, SOMETIMES I HAVEN'T BEEN COPING AS WELL AS USUAL
I HAVE BEEN SO UNHAPPY THAT I HAVE HAD DIFFICULTY SLEEPING: YES, SOMETIMES
TOTAL SCORE: 12
I HAVE LOOKED FORWARD WITH ENJOYMENT TO THINGS: AS MUCH AS I EVER DID

## 2024-11-19 NOTE — PROGRESS NOTES
"Assessment:   3 1/2 week old infant gaining weight rapidly on breastfeeding with supplementation;  about 1.8 oz/day over last 1 1/2 weeks  Able to latch to breast with use of nipple shield;  unable to sustain latch without shield, likely due to mother's very soft and short-shafted nipples  Baby with improved jaw excursion and good suck, but no measurable milk transfer during observed visit today  Mother with very low milk supply, likely multifactorial    Plan:    Continue to feed Juliette on cue, 8-12 times each day, offering your breast as often as you are able.  Now that he has returned to their birthweight, you can trust them to awaken when they need to eat--you do not need to awaken them on a schedule.  When you nurse, feed on one side until baby finishes swallowing.  Once swallowing slows, use breast compression to encourage more intake, but once baby is sleeping, coming off the breast, or just \"nibbling,\" you can take baby off the breast and move to the other side.  Offer both breasts at each feeding.     Use the nipple shield for now, as Juliette continues to have difficulty latching without it.  You can begin to experiment with weaning from this, by removing it partway through the feeding.  Juliette needs about 21 - 22 oz of milk each day to grow well.  Until he is able to get more milk at your breast, he needs about 2  - 2.5 oz each feeding in supplementation, using your breastmilk as your first choice and formula when the supply of pumped milk runs out.    Know that babies have different sleep patterns than adults--right when they go to sleep, they go into a light sleep pattern rather than a deep sleep.  This can mean that if you nurse them to sleep and then lay them down, they may wake and be distressed, and seem like they are still hungry.  Try holding your baby for a little longer after feeding, giving them a chance to move into deeper sleep, before putting them down, and they may be content for a little " "longer.     It is best for babies to meet most of their sucking needs at the breast in the first few weeks, so minimizing pacifier use is a good idea.  Once baby is latching and taking milk well, though, occasional use of a pacifier for calming when baby has had enough to eat is fine.  Do not use it routinely just to keep baby \"quiet.\"   Possible causes of low milk supply can include not enough breast stimulation (especially in early days), too early or too much formula supplementation, hormonal dysfunction, baby not being able to suck well, or insufficient glandular tissue in mother.  In your case, it seems that a combination of hormonal concerns and possible decreased milk-making tissue are both possibilities  Evaluation of low milk supply can involve a breast exam, trying more frequent pumping to see if this improves supply, or bloodwork such as thyroid and prolactin evaluation.  Your breasts appear normal.  Thyroid imbalance can affect milk supply and that is is treatable.  Prolactin is the hormone of milk-making, and does not  correlate exactly with milk supply, but can sometimes give some information about a possible reason for low milk supply.  There is no \"prolactin supplement\" to take if one's prolactin is low, however, and most of the things to do to increase it are the things people are already doing, such as frequent nursing or pumping.  Today we decided to check both your thyroid and prolactin levels.  We would expect your prolactin to be between 250 and 300.   Options for attempting to increase milk supply might involve more frequent pumping (although you may not be able to pump more than you are) and/or possible use of a hospital-grade pump.  You can consider if this is something that interests you.  There are several supplements and medications that can be used to increase milk supply.  Moringa, goat's rue and fenugreek are some that people have found to be helpful and have some research evidence " "supporting their use.  Regarding medications for milk supply, domperidone is one such medication, but this is not available in the [place]. The other option is a prescription for Reglan;  some people find this helpful and some do not.  We use it for only two weeks, as it does have the potential for worsening depression in people who have that history, and with long term use can also cause abnormal muscle twitching.   For these reasons, Reglan is likely not a good choice for you.  T here is an excellent book on this topic called Making More Milk by Antonieta Serna, IBCLC.  Given breastfeeding challenges, although some families pursue all avenues and some wean to formula entirely, there are many other options for coping. These can include decreasing pumping efforts and using formula for supplementation, setting a time boundary on continuing to \"triple feed,\" or moving to exclusive pumping if pumping is easy and productive for you.  Breastfeeding does not need to be a black-and-white choice, and you can consider what works best for your family.     Follow up with lactation as needed, and pediatric provider as planned.  [facility] can be used for brief questions, but it's important to know that messages are not seen Friday through Sunday. If urgent help is needed, Monday through Friday you can call [phone] and one of our lactation consultants will get the message and respond; if you need a rapid response over a weekend or holiday, it is best to call your on-call maternity or pediatric provider.  Please feel free to schedule a return visit if the concern is more detailed;  telephone visits are also an option if you don't feel you need to be seen in person.     Subjective: Coty and her mother are here today for a follow up visit with concerns about low milk production.  She was first seen about two weeks ago with difficulty latching baby [place] and use of nipple shield.  At that time baby was noted to have very tight jaw and " shallow latch;  able to latch with use of nipple shield but had no measurable milk transfer.  Coty reports today that baby Juliette is latching to breast with use of nipple shield, but is very sleepy, and he frequently seems to want more milk from a bottle after feeding.  Coty is noticing some increased breast fullness, but finds that her right side produces significantly less milk than her left.  She is putting Juliette to breast about five times, supplementing with about 2- 3 oz of formula or expressed milk.  Juliette bottlefeeds for night feedings, taking 2-3 oz.  Coty is taking a sleep break of about 6 hours at night without removing milk, but does nurse or pump regularly at other times.  Is pumping 2-4 times/day releasing 1 - 1.75 oz/session. She is concerned about her milk production and wants to discuss potential dietary supplements to increase milk supply, if any labwork needs to be done to evaluate supply, and also ways to encourage baby to stay at breast longer.  Interested in potentially using a tube supplementer at the breast.  She has been using acupuncture for mood, general wellness and milk supply.  Finally, she is concerned that Juliette may have thrush.    She is vaccinated for Covid-19, with most recent dose 9/23/24.    Hospital Course: Augmentation for PROM;  birth complicated by 3rd degree laceration, sulcal and periclitoral tears.  Baby initially sleepy and bottlefed shortly after delivery.  Seen by hospital IBCLC and baby nursing some with shield but very sleepy;  Coty pumping but with minimal success.  Having significant anxiety.    Mother's Relevant Med/Surg History: Anxiety on Zoloft;  headaches;  IBS.  No irregular cycles but did 8 months to conceive; began fertility workup which found decreased ovarian reserve, but conceived spontaneously.  History of Type 2 diabetes in old age for grandfather.     Breastfeeding Goals: direct breastfeeding    Previous Breastfeeding Experience: first baby    Infant's  "name: Juliette Jeffery  Infant's bday: [birth year]  Gestational age: 37w2d  Infant's birth weight: 6 # 13.7 oz   Discharge weight: 6 # 8.9 oz     Pediatric Provider: [facility], Sunday Avilez PA-C  Recent weights:  10/31/24:  6 # 2.8 oz  11/7/24:  6 # 13.2 oz   11/8/24:  6 # 14.4 oz      Peq Lactation Visit Questionnaire    11/19/2024  3:33 PM CST - Filed by Patient   What is your main concern today? 1) breastmilk production 2) latching concerns 3) increased appetite eating more than 2 oz in survinv 4) oral thrash   Your baby's first name: Juliette   Your baby's last name: Latia   Baby's most recent weight: On visit on 11/8/24   Date: 11/8/24   Since your last visit, have you had any new medical problems or surgeries? No   How often does your baby eat? 8-10 times per day   How long does each feeding last?  1-1.5 hours   How much of the time does your baby take both breasts when nursing? 50%   Can you hear the baby swallowing during nursing? Yes   How many times does your baby feed in 24 hours? 10   How many times does your baby urinate (pee) in 24 hours? 5   How many stools (poops) does your baby have in 24 hours? 7   Describe the color and consistency of the poop: Soft light brown   Do you give your baby extra milk in addition to or instead of breastfeeding? Formula   How much extra do you usually give? 2 oz   How do you give extra milk? Bottle   Are you pumping your breasts? Yes   How often? 2-4 times   How much is pumped? 1-1 3/4 oz   What else would you like the lactation consultant to know? Mood swings more in week three. Evening feedjngs are hard.          Objective/Physical exam:   Her nipples are very short shafted and retractile, the areola is compressible, the breast is soft and pendulous.    Sore nipples: no   EPDS: 12, with \"never\" marked for question on thoughts of self-harm.  Coty has a therapist that she sees regularly and finds this helpful.  Feels that current dose of " Zoloft is adequate.    Assessment of infant: 17.56% Weight for age percentile   Age today: 3 1/2 weeks   Today's weight: 8 # 2 oz     Amount of milk transferred from LEFT side: none measurable  Amount of milk transferred from RIGHT side: none measurable    Baby has full flexion of arms and legs, normal tone, behavior is alert and active, respirations are normal, skin is normal, hydration is normal, jaw is normal size and alignment and muscle tightness is improved, palate is normal, frenulum is normal, baby can lateralize tongue, has adequate tongue lift, and tongue can protrude past bottom gum line. Upper labial frenulum is normal.    Suck exam:  Baby ahd strong, coordinated suck with normal tongue cupping, and jaw tightness is significantly improved.     Baby thrush: none    Jaundice: none      Feeding assessment: Baby was able to grasp the breast using a nipple shield, with more ease than at first visit.  Attempted without use of nipple shield, and baby was able to briefly grasp breast but without deep latch or sustained grasp at breast.       On second breast attempted use of a nursing supplementer tube at the breast with nipple shield--baby was able to draw some milk in this way, but nipple shield did interfere with effectiveness.    Alignment: The baby was flex relaxed. Baby's head was aligned with its trunk. Baby did face mother. Baby was in cross cradle position today.     Areolar Grasp: Baby was able to easily open mouth widely. Baby's lips were not pursed. Baby's lips did flange outward. Tongue was visible over bottom gum. Baby had complete seal.     Areolar Compression: Baby made rhythmic motion but little to no nutritive-type sucks. There were no clicking or smacking sounds. There was no severe nipple discomfort. Nipples appeared rounded after feeding.    Audible swallowing: Baby made no noted quiet sounds of swallowing: There was no increase in frequency after milk ejection reflex. The milk ejection  reflex is indeterminate and milk supply is low.     /80 (BP Location: Right arm, Patient Position: Sitting, Cuff Size: Adult Regular)   Pulse 100   Temp 98  F (36.7  C) (Oral)   Wt 109.9 kg (242 lb 4.8 oz)   LMP 02/10/2024   SpO2 94%   Breastfeeding Yes   BMI 34.28 kg/m    OB History    Para Term  AB Living   1 1 1 0 0 1   SAB IAB Ectopic Multiple Live Births   0 0 0 0 1      # Outcome Date GA Lbr Dustin/2nd Weight Sex Type Anes PTL Lv   1 Term 10/28/24 37w2d 04:10 / 01:44 3.11 kg (6 lb 13.7 oz) M Vag-Spont EPI N DEBBI      Name: Juliette Doyle      Apgar1: 7  Apgar5: 9       Current Outpatient Medications:     albuterol (PROAIR HFA/PROVENTIL HFA/VENTOLIN HFA) 108 (90 BASE) MCG/ACT Inhaler, Inhale 2 puffs into the lungs as needed for shortness of breath or wheezing, Disp: , Rfl:     cyanocobalamin (VITAMIN B-12) 500 MCG SUBL sublingual tablet, , Disp: , Rfl:     ferrous sulfate (FEROSUL) 325 (65 Fe) MG tablet, Take 1 tablet (325 mg) by mouth daily (with breakfast)., Disp: 45 tablet, Rfl: 0    ibuprofen (ADVIL/MOTRIN) 200 MG tablet, Take 3 tablets (600 mg) by mouth every 6 hours as needed for moderate pain. Start after delivery, Disp: , Rfl:     polyethylene glycol (MIRALAX) 17 GM/Dose powder, Take 17 g (1 Capful) by mouth daily., Disp: 510 g, Rfl: 0    Prenatal Vit-DSS-Fe Cbn-FA (PRENATAL AD PO), , Disp: , Rfl:     senna-docusate (SENOKOT-S/PERICOLACE) 8.6-50 MG tablet, Take 2 tablets by mouth daily. Start after delivery., Disp: , Rfl:     sertraline (ZOLOFT) 50 MG tablet, Take 1 tablet (50 mg) by mouth daily, Disp: 90 tablet, Rfl: 3    Vitamin D, Cholecalciferol, 25 MCG (1000 UT) CAPS, , Disp: , Rfl:     acetaminophen (TYLENOL) 325 MG tablet, Take 2 tablets (650 mg) by mouth every 6 hours as needed for mild pain. Start after Delivery. (Patient not taking: Reported on 2024), Disp: , Rfl:     Iron-Vitamin C (IRON 100/C) 100-250 MG TABS, , Disp: , Rfl:   Past Medical History:    Diagnosis Date    CARDIOVASCULAR SCREENING; LDL GOAL LESS THAN 160 2012    Contraceptive management 2012    History of COVID-19     Jaw pain 2021    Nonintractable episodic headache, unspecified headache type 2022    Plantar warts 2012    Uncomplicated asthma      Past Surgical History:   Procedure Laterality Date    MANDIBLE SURGERY  2004    ORTHOPEDIC SURGERY      Jaw surgery    PE TUBES      as child.     Family History   Problem Relation Age of Onset    Osteoporosis Mother     Varicose Veins Mother     Hypertension Father     Allergies Father     Eye Disorder Father         Glasses    Thyroid Disease Maternal Grandmother     Heart Disease Maternal Grandfather         Pace Maker    Diabetes Maternal Grandfather         Type 2 late in life    Prostate Cancer Maternal Grandfather         Prostate    Eye Disorder Maternal Grandfather         Glasses    Hypertension Paternal Grandfather         Recently passed in 2021    Anxiety Disorder Sister     Anxiety Disorder Sister     Thyroid Disease Other         Maternal aunt    Obesity Other         Maternal cousin    Cancer Maternal Aunt     Coronary Artery Disease No family hx of        Time spent:  Chart review/prechartin min prior to day of service  Face-to-face visit:   67 min   Documentation:  16 min   Total time spent on day of service: 83 min    PALOMA Oviedo, CNM, IBCLC

## 2024-11-20 ENCOUNTER — OFFICE VISIT (OUTPATIENT)
Dept: OBGYN | Facility: CLINIC | Age: 39
End: 2024-11-20
Payer: COMMERCIAL

## 2024-11-20 VITALS
BODY MASS INDEX: 34.28 KG/M2 | WEIGHT: 242.3 LBS | SYSTOLIC BLOOD PRESSURE: 128 MMHG | OXYGEN SATURATION: 94 % | HEART RATE: 100 BPM | TEMPERATURE: 98 F | DIASTOLIC BLOOD PRESSURE: 80 MMHG

## 2024-11-20 DIAGNOSIS — O92.79 INSUFFICIENT LACTATION: Primary | ICD-10-CM

## 2024-11-20 PROCEDURE — 36415 COLL VENOUS BLD VENIPUNCTURE: CPT | Performed by: ADVANCED PRACTICE MIDWIFE

## 2024-11-20 NOTE — PATIENT INSTRUCTIONS
"   Continue to feed Juliette on cue, 8-12 times each day, offering your breast as often as you are able.  Now that he has returned to their birthweight, you can trust them to awaken when they need to eat--you do not need to awaken them on a schedule.  When you nurse, feed on one side until baby finishes swallowing.  Once swallowing slows, use breast compression to encourage more intake, but once baby is sleeping, coming off the breast, or just \"nibbling,\" you can take baby off the breast and move to the other side.  Offer both breasts at each feeding.     Use the nipple shield for now, as Juliette continues to have difficulty latching without it.  You can begin to experiment with weaning from this, by removing it partway through the feeding.  Juliette needs about 21 - 22 oz of milk each day to grow well.  Until he is able to get more milk at your breast, he needs about 2  - 2.5 oz each feeding in supplementation, using your breastmilk as your first choice and formula when the supply of pumped milk runs out.  You can give this extra by bottle, using a supplementer tube at the breast, or a combination of the two.    Know that babies have different sleep patterns than adults--right when they go to sleep, they go into a light sleep pattern rather than a deep sleep.  This can mean that if you nurse them to sleep and then lay them down, they may wake and be distressed, and seem like they are still hungry.  Try holding your baby for a little longer after feeding, giving them a chance to move into deeper sleep, before putting them down, and they may be content for a little longer.     It is best for babies to meet most of their sucking needs at the breast in the first few weeks, so minimizing pacifier use is a good idea.  Once baby is latching and taking milk well, though, occasional use of a pacifier for calming when baby has had enough to eat is fine.  Do not use it routinely just to keep baby \"quiet.\"   Possible causes of low " "milk supply can include not enough breast stimulation (especially in early days), too early or too much formula supplementation, hormonal dysfunction, baby not being able to suck well, or insufficient glandular tissue in mother.  In your case, it seems that a combination of hormonal concerns and possible decreased milk-making tissue are both possibilities  Evaluation of low milk supply can involve a breast exam, trying more frequent pumping to see if this improves supply, or bloodwork such as thyroid and prolactin evaluation.  Your breasts appear normal.  Thyroid imbalance can affect milk supply and that is is treatable.  Prolactin is the hormone of milk-making, and does not  correlate exactly with milk supply, but can sometimes give some information about a possible reason for low milk supply.  There is no \"prolactin supplement\" to take if one's prolactin is low, however, and most of the things to do to increase it are the things people are already doing, such as frequent nursing or pumping.  Today we decided to check both your thyroid and prolactin levels.  We would expect your prolactin to be between 250 and 300.   Options for attempting to increase milk supply might involve more frequent pumping (although you may not be able to pump more than you are) and/or possible use of a hospital-grade pump.  You can consider if this is something that interests you.  There are several supplements and medications that can be used to increase milk supply.  Moringa, goat's rue and fenugreek are some that people have found to be helpful and have some research evidence supporting their use.  Regarding medications for milk supply, domperidone is one such medication, but this is not available in the [place]. The other option is a prescription for Reglan;  some people find this helpful and some do not.  We use it for only two weeks, as it does have the potential for worsening depression in people who have that history, and with long " "term use can also cause abnormal muscle twitching.   For these reasons, Reglan is likely not a good choice for you.  T here is an excellent book on this topic called Making More Milk by Antonieta Serna, IBCLC.  Given breastfeeding challenges, although some families pursue all avenues and some wean to formula entirely, there are many other options for coping. These can include decreasing pumping efforts and using formula for supplementation, setting a time boundary on continuing to \"triple feed,\" or moving to exclusive pumping if pumping is easy and productive for you.  Breastfeeding does not need to be a black-and-white choice, and you can consider what works best for your family.     Follow up with lactation as needed, and pediatric provider as planned.  Otterology can be used for brief questions, but it's important to know that messages are not seen Friday through Sunday. If urgent help is needed, Monday through Friday you can call [phone] and one of our lactation consultants will get the message and respond; if you need a rapid response over a weekend or holiday, it is best to call your on-call maternity or pediatric provider.  Please feel free to schedule a return visit if the concern is more detailed;  telephone visits are also an option if you don't feel you need to be seen in person.     __  For weaning from nipple shield, try without the shield when your baby is calm and willing to eat, but not frantic.  Sometimes it works best when they are sleepy, or even nearly asleep.     You can try starting some feedings without the shield in place.  If baby does not do well, you can move to using it to finish the feeding.  Alternatively, you can start the feeding with the shield in place and then taking it off once baby is more satisfied and calm and the milk is flowing well.  Some feedings may go well without the shield, and some not--don't panic!   Sometimes weaning from the shield can take a few weeks.  Be patient, " because it is almost always successful in the end.    Here is an excellent article that goes through some steps for weaning from the shield:    Weaning from Nipple Shield  https://www.[employer].com/blog/my-ten-step-process-for-weaning-from-the-nipple-shield/     ____________      Using a Nursing Supplementer Tube at the breast  https://www.youtube.com/watch?v=gRCitroQDvk      Dr. Fernando Paz demonstration of supplementer tube at breast  (older video)   Https://www.youtube.com/watch?v=ezGIkIkhC_o      ____________________________    To obtain hospital grade pump:      1.  Call insurance company to check coverage and if any additional information is needed.  Ask if insurance will cover hospital grade pump, which is pump type     2.  Ask if you can get this pump from [employer], and if not, which medical equipment company you need to use    3.  The clinic can enter an order/prescription for you and it will automatically transmit to [employer].  If you need a paper copy for a different company, please let us know or ask the insurance company to send the form to our clinic and we can fill it out.    Cost for pump rental is around $88/month from [employer] if it is not covered by insurance, plus $60 for the tubing set (if you do not already have a Medela pump)    Can also obtain hospital grade pumps from:    [facility]  [phone], cost about $63/month    [facility]  [phone], cost about $70/month      [employer] rental locations:    [place]:  [facility], first floor of hospital building  [address], [address] [phone]    [place]:  [facility] (across from [facility]), 3rd floor  [address], [address]  [phone]    [place]:    [facility]  [address], [address]  [phone]    [place]:  [facility]  [address], [address]  [phone]    [place]:  [facility]  [address], [address] [phone]    [place]:  [facility]  [address], [address]  [phone]  (Closed daily 12 pm - 1 pm)    [place]:  [facility]  [address] , [address]  [phone]  (Open til 5 pm)       ___________________    -------------------------------------------------------------------------------------------------  Information for breastfeeding families on Increasing breastmilk supply     Frequent stimulation of the breasts, by breastfeeding or by using a breast pump, during the first few days and weeks, is essential to establish an abundant breastmilk supply. If you find your milk supply is low, try the following recommendations. The most effective way to increase milk supply is to boost your own hormones, which you do by stimulating your breasts and removing more milk from them--this increases your prolactin levels and increases supply.  Many people will see an improvement within a few days. Although it may take a month or more to bring your supply up to meet your baby's needs, depending on how low the supply is, we would expect to see steady, gradual improvement.  If you are still having difficulty, please contact the lactation consultant for more support.      More breast stimulation:  the most important thing!  --Breastfeed more often, at least 8-12 times per 24 hours.   --Discontinue the use of a pacifier (so that when the baby wants to suck, they are stimulating the breasts for milk production)  --Try to get in \"one more feeding\" before you go to sleep, even if you have to wake the baby.  --Offer both breasts at each feeding  --\"Switch nursing:\" using each breast twice or three times in a feeding, and using different positions  --\"Top up feeds\" give a short feeding in 10-20 minutes if baby seems " "hungry  -- Remove milk well by massaging breasts while the baby is feeding  --Try breast compression - pushing milk to baby during a feeding    Avoid these things that can reduce breastmilk supply in some people:  --Smoking  --Caffeine  --Birth control pills and injections, especially those containing estrogen. Progesterone-only methods are not likely to impact milk supply.  --Decongestants such as Sudafed  --Severe weight loss diets  --Mints, parsley, evi in excessive amounts    Use a breast pump  --Consider use of a hospital grade breast pump--these are available for rent, and your provider or lactation consultant can help you to obtain one if needed. (Many breast pumps are marketed as \"hospital grade,\" but there are only a few truly hospital-grade pumps, and they generally cost around $2000)  --Pump after feedings or between feedings.  Remember that shorter, more frequent pumping sessions are more helpful than longer sessions that happen less frequently.  Anytime you can squeeze a little time in is helpful!  --Rest 10-15 minutes prior to pumping, eat and drink something.  Be nice to yourself!  During this time trying applying warmth to your breasts and massaging them gently before beginning to pump  --Do hand expression after pumping. This can help bring out more milk, as well as offer extra breast stimulation.  --Try \"power pumping\" for 2 or 3 days. Pumping 12 x a day after feedings, even for a short time. Or, try an hour of pumping for 10min, resting for 10 min, then pumping for another 10 min, etc.,  for a few times a day.     Condition your let-down reflex--sometimes when we are anxious about milk supply, it becomes more and more difficult to release milk, which then impacts supply.  Some ideas:  --Play relaxing music  --Imagine your baby, look at pictures or videos of your baby, smell baby clothing or items you associate with your baby (shampoo, powder, etc)  --Alternatively, if thinking about your baby and " "their need for milk is stressful instead of relaxing, do something completely different!  Call a friend, play a game, listen to a podcast or a meditation while you pump  --Consider covering the bottles with a blanket or baby socks so you don't watch how quickly the milk is flowing  --Always pump in the same quiet, relaxed place, and set up a pleasant routine  --Do slow, deep, relaxed breathing, relax your shoulders  --Try to let go of the idea that a specific amount of milk is needed;  just doing the pumping at all is helpful    Mother care  --Reduce stress and activity, get help  --Increase fluid intake, but just to thirst.  More water doesn't magically turn into more milk!  --Eat nutritious meals, continue to take prenatal vitamins  --Back rubs stimulate nerves that serve the breasts (central part of the spine)  --Increase skin-to-skin holding time with your baby, relax together  --Take a warm, bath, read, meditate, and empty your mind of tasks that need to be done    Herbs, food and supplements   --These may offer help to some women, but frequent milk removal helps much more!  Supplements are not a substitute.    --Moringa (also called malunggay):  this is a leaf that is commonly eaten in [place], and has been shown in a number of studies to increase milk supply.  In [place] it is found in powder form, often sold in capsules.  It is sold under a number of brand names. A common dose is 250-350 mg three times daily.  --Sources for moringa/malunggay for helping with milk supply:    Brands containing or comprised of moringa:  Go-Lacta  Motherlove Herbal Tincture  [employer] \"Adoptive Lactation Milk-in\"  [employer] \"Milk A-Plenty\" ([place])  Milkapalooza or [employer] Cow by [employer]  Lush Leche or Milk Machine by E[employer]  (Or can purchase just moringa itself from [employer] [place] or [employer])     --Fenugreek:  Doses of 3-5 capsules (580-610 mg each) three times per day are " commonly recommended. Avoid fenugreek if you are diabetic, hypoglycemic, asthmatic or allergic to peanuts or other legumes or beans. Taken as directed, it may cause a faint maple body odor. That is to be expected and means that the herb is doing its job.   --Winn's yeast: 3 Tablespoons daily, increase by 1/2 teaspoon daily until results are seen  --Torbangun:  a leaf used in [place] for helping with milk supply.  A few studies have found this to be helpful.  Several companies sell this in compounds for increasing mother's milk.  --Shatavari:  a type of asparagus found in [place], also found to help with milk supply.  Available in several compounds made by different companies.  --Oatmeal:  try a bowl daily.  Barley and quinoa have also been found to be helpful.  --Calcium supplement:  this seems to be particularly helpful for those women who note a decrease in milk supply around their menstrual cycles.  Take 1500 mg of calcium with 750 mg of magnesium daily from midcycle through your period.  --Blessed thistle, goat's rue, or other herbs or beverages such as Mother's Milk Tea taken as directed on the package. Reliable sources of herbs and herbal blends are [employer]love Herbals, [employer] and Legendairy Milk.  --Lactation cookies, bars or snacks:  these are very expensive (often around $20 for one package of mix) and many do not contain anything more special than oatmeal, flaxseed and winn's yeast, along with sugar and chocolate.  Probably not really worth the money.    --Keep records  --It is important to keep a daily log with the number of pumping sessions, amount obtained amount you are having to supplement your baby and 24 hour totals, this amount is more important that the pumped amount at each session. This will help you see your progress over the days.   --Keep in touch with your health care provider so he/she can monitor your progress over the days and modify advice if necessary.     Potential Other Causes  of low milk supply:      Retained placenta  If you are not seeing improvement and you are having any heavy bleeding, discuss the possibility of retained placental fragments with your MD. Small bits of the placenta can secrete enough hormones to prevent the milk from coming in.  There is also a possibility that taking encapsulated placenta may decrease milk supply, although there is little research on this.      Low thyroid  Have you health care provider check your thyroid levels. Low thyroid can affect milk supply. If you have been taking thyroid medication, have your levels checked after delivery, you may need your medication adjusted.     Other resources:     http://www.lowmilksupply.org    Milk Expression and Pumping for strong milk supply  https://[employer]ingo/592672583    [place] Video demonstrating hand expression (demo begins at about minute 2:00)  [email or web address]-[email or web address]    [employer] Maximizing Milk Production Video:  [email or web address]

## 2024-11-21 LAB
PROLACTIN SERPL 3RD IS-MCNC: 76 NG/ML (ref 5–23)
T4 FREE SERPL-MCNC: 0.86 NG/DL (ref 0.9–1.7)
TSH SERPL DL<=0.005 MIU/L-ACNC: 1.24 UIU/ML (ref 0.3–4.2)

## 2024-11-23 ENCOUNTER — MYC MEDICAL ADVICE (OUTPATIENT)
Dept: PSYCHIATRY | Facility: CLINIC | Age: 39
End: 2024-11-23
Payer: COMMERCIAL

## 2024-11-23 DIAGNOSIS — F33.41 MAJOR DEPRESSIVE DISORDER, RECURRENT EPISODE, IN PARTIAL REMISSION (H): ICD-10-CM

## 2024-11-23 DIAGNOSIS — F41.1 GAD (GENERALIZED ANXIETY DISORDER): Primary | ICD-10-CM

## 2024-11-25 ENCOUNTER — THERAPY VISIT (OUTPATIENT)
Dept: PHYSICAL THERAPY | Facility: CLINIC | Age: 39
End: 2024-11-25
Payer: COMMERCIAL

## 2024-11-25 DIAGNOSIS — M54.50 CHRONIC MIDLINE LOW BACK PAIN WITHOUT SCIATICA: Primary | ICD-10-CM

## 2024-11-25 DIAGNOSIS — M99.05 SOMATIC DYSFUNCTION OF PELVIC REGION: ICD-10-CM

## 2024-11-25 DIAGNOSIS — M62.08 DIASTASIS OF RECTUS ABDOMINIS: ICD-10-CM

## 2024-11-25 DIAGNOSIS — G89.29 CHRONIC MIDLINE LOW BACK PAIN WITHOUT SCIATICA: Primary | ICD-10-CM

## 2024-11-25 PROCEDURE — 97530 THERAPEUTIC ACTIVITIES: CPT | Mod: GP

## 2024-11-25 PROCEDURE — 97110 THERAPEUTIC EXERCISES: CPT | Mod: GP

## 2024-11-25 PROCEDURE — 97161 PT EVAL LOW COMPLEX 20 MIN: CPT | Mod: GP

## 2024-11-25 NOTE — PROGRESS NOTES
PHYSICAL THERAPY EVALUATION  Type of Visit: Evaluation       Fall Risk Screen:  Fall screen completed by: PT  Have you fallen 2 or more times in the past year?: No  Have you fallen and had an injury in the past year?: No  Is patient a fall risk?: No    Subjective         Presenting condition or subjective complaint:  Patient returns to therapy 4 weeks post-partum after  10/28/24, baby boy. Reports 3a tear with delivery. Stools have been mixed d/t iron and stool softeners, feels incomplete. Low back pain, noticeable when sleeping, on R side. Notes holding him on R>L and feeling more tension on L side of body. Using a pillow to prop baby during breast feeding. Reduced swell in hands and feet/ankles after delivery. C/o R carpel tunnel.   Date of onset: 10/28/24 (date of delivery)    Relevant medical history:     Past Medical History:   Diagnosis Date    CARDIOVASCULAR SCREENING; LDL GOAL LESS THAN 160 2012    Contraceptive management 2012    History of COVID-19     Jaw pain 2021    Nonintractable episodic headache, unspecified headache type 2022    Plantar warts 2012    Uncomplicated asthma        Dates & types of surgery:    Past Surgical History:   Procedure Laterality Date    MANDIBLE SURGERY  2004    ORTHOPEDIC SURGERY      Jaw surgery    PE TUBES      as child.       Prior diagnostic imaging/testing results:  no     Prior therapy history for the same diagnosis, illness or injury:  Yes, for JOSE D and hip pain antepartum      Living Environment  Social support:   spouse  Type of home:     Stairs to enter the home:         Ramp:     Stairs inside the home:         Help at home:    Equipment owned:       Employment:      Hobbies/Interests:      Patient goals for therapy:  Recovery of pelvic floor and improved low back pain and carpel tunnel       Objective      PELVIC EVALUATION  ADDITIONAL HISTORY:  Sex assigned at birth:  female  Gender identity:      Pronouns:        Bladder  History:  Using mikal bottle with no burning.   Feels bladder filling:  yes, normal urges  Triggers for feeling of inability to wait to go to the bathroom:      How long can you wait to urinate:    Gets up at night to urinate:  yes, due to baby waking    Can stop the flow of urine when urinating:    Volume of urine usually released:     Other issues:    Number of bladder infections in last 12 months:    Fluid intake per day:  90oz water      Medications taken for bladder:       Activities causing urine leak:  none    Amount of urine typically leaked:  none  Pads used to help with leaking:          Bowel History:  Miralax 1 cap and 1 stool softener (senna-S) per day   Frequency of bowel movement:    Consistency of stool:  mixed, BSC types 4-5     Ignores the urge to defecate:    Other bowel issues:  denies straining, incomplete emptying  Length of time spent trying to have a bowel movement:      Sexual Function History:  Sexual orientation:      Sexually active:  no, not since delivery  Lubrication used:      Pelvic pain:  denies    Pain or difficulty with orgasms/erection/ejaculation:      State of menopause:    Hormone medications:  no      Currently breast feeding.   Delivery: 3a level tear with stiches in 2 places.   Walking <1 mile and yoga 3x/week 30 minutes for exercise.        Discussed reason for referral regarding pelvic health needs and external/internal pelvic floor muscle examination with patient/guardian.  Opportunity provided to ask questions and verbal consent for assessment and intervention was given.    PAIN: Pain Level at Rest: 0/10  Pain Level with Use: 5/10  Pain Location: lumbar spine, hip, and R sided  Pain Quality: Dull, Throbbing, and crampy  Pain Frequency: intermittent  Pain is Worst: nighttime  Pain is Exacerbated By: holding baby, breast feeding, laying down for bed (back or sides)  Pain is Relieved By: otc medications and stretch  Pain Progression: Unchanged    R wrist pain:   Numbness in  median nerve distribution in R hand  Performing flexion/extension strengthening with body weight.   Recommended extension stretch, extensor massage, and adding light weight to strengthening.     POSTURE: Standing Posture: Rounded shoulders, Lordosis increased  LUMBAR SCREEN:   Flexion: WNL  Extension: min limitation, tightness  SG: L SG causes LB popping, WNL  HIP SCREEN: flexion & ER WNL. IR mod limited bilaterally with tension  Functional Strength Testing: Double Leg Squat: Anterior knee translation and slight lateral shift to R at lowest squat range  Single Leg Squat: Anterior knee translation, Hip internal rotation, Improper use of glutes/hips, and minimal IR     PELVIC/SI SCREEN: Thigh thrust (-) bilaterally, reports hip discomfort on R. Pelvis compression and distraction (-)     PELVIS/SI SPECIAL TESTS: Supine to sit test: (-)    PELVIC EXAM- deferred until after 6 week post partum follow up appointment  ABDOMINAL ASSESSMENT  Diastasis Rectus Abdominis (JOSE D):  JOSE D presence: Yes  Location   Above Umbilicus Depth/Finger width: 2   At Umbilicus Depth/Finger width: 2   Below Umbilicus Depth/Finger width: 1.5    Abdominal Activation/Strength: SLR: 0/5 difficulty bilaterally, reports tension in hip flexor region bilaterally on active leg.     Assessment & Plan   CLINICAL IMPRESSIONS  Medical Diagnosis:      Treatment Diagnosis: Low back pain, somatic dysfunction of pelvic region, JOSE D   Impression/Assessment: Patient is a 39 year old female with incomplete bowel emptying, low back pain, carpal tunnel numbness, postural changes complaints.  The following significant findings have been identified: Pain, Decreased strength, Impaired muscle performance, Decreased activity tolerance, and Impaired posture. These impairments interfere with their ability to perform self care tasks, recreational activities, household chores, and community mobility as compared to previous level of function.     Clinical Decision Making  (Complexity):  Clinical Presentation: Stable/Uncomplicated  Clinical Presentation Rationale: based on medical and personal factors listed in PT evaluation  Clinical Decision Making (Complexity): Low complexity    PLAN OF CARE  Treatment Interventions:  Modalities: Biofeedback, Ultrasound  Interventions: Manual Therapy, Neuromuscular Re-education, Therapeutic Activity, Therapeutic Exercise, Self-Care/Home Management    Long Term Goals     PT Goal 1  Goal Identifier: Bowel movements  Goal Description: Patient will achieve daily bowel movement without straining or incomplete emptying or without use of stimulant  Rationale: to maximize safety and independence with performance of ADLs and functional tasks;to maximize safety and independence with self cares  Target Date: 02/16/25  PT Goal 2  Goal Identifier: LBP  Goal Description: Patient will report <2/10 low back pain for at least 2 weeks with desired activities  Rationale: to maximize safety and independence with performance of ADLs and functional tasks  Target Date: 02/16/25  PT Goal 3  Goal Identifier: Pelvic floor  Goal Description: Patient will achieve full pelvic floor range of motion without pain or symptoms as measured visually or with RUSI to return to PLOF pre pregnancy  Rationale: to maximize safety and independence with performance of ADLs and functional tasks;to maximize safety and independence with self cares  Target Date: 02/16/25      Frequency of Treatment: 1x per week for 2 weeks, 1x per 2 weeks for 10 week adjusting frequency as indicated by patient presentation  Duration of Treatment: 12 weeks    Recommended Referrals to Other Professionals:   Education Assessment:   Learner/Method: Patient;No Barriers to Learning    Risks and benefits of evaluation/treatment have been explained.   Patient/Family/caregiver agrees with Plan of Care.     Evaluation Time:     PT Eval, Low Complexity Minutes (94498): 30       Signing Clinician: Lady Chery  PT

## 2024-11-26 ENCOUNTER — PATIENT OUTREACH (OUTPATIENT)
Dept: CARE COORDINATION | Facility: CLINIC | Age: 39
End: 2024-11-26
Payer: COMMERCIAL

## 2024-11-26 RX ORDER — SERTRALINE HYDROCHLORIDE 100 MG/1
100 TABLET, FILM COATED ORAL DAILY
Qty: 30 TABLET | Refills: 2 | Status: SHIPPED | OUTPATIENT
Start: 2024-11-26

## 2024-11-29 ASSESSMENT — ANXIETY QUESTIONNAIRES
GAD7 TOTAL SCORE: 11
5. BEING SO RESTLESS THAT IT IS HARD TO SIT STILL: SEVERAL DAYS
IF YOU CHECKED OFF ANY PROBLEMS ON THIS QUESTIONNAIRE, HOW DIFFICULT HAVE THESE PROBLEMS MADE IT FOR YOU TO DO YOUR WORK, TAKE CARE OF THINGS AT HOME, OR GET ALONG WITH OTHER PEOPLE: VERY DIFFICULT
GAD7 TOTAL SCORE: 11
7. FEELING AFRAID AS IF SOMETHING AWFUL MIGHT HAPPEN: SEVERAL DAYS
3. WORRYING TOO MUCH ABOUT DIFFERENT THINGS: MORE THAN HALF THE DAYS
2. NOT BEING ABLE TO STOP OR CONTROL WORRYING: MORE THAN HALF THE DAYS
8. IF YOU CHECKED OFF ANY PROBLEMS, HOW DIFFICULT HAVE THESE MADE IT FOR YOU TO DO YOUR WORK, TAKE CARE OF THINGS AT HOME, OR GET ALONG WITH OTHER PEOPLE?: VERY DIFFICULT
4. TROUBLE RELAXING: MORE THAN HALF THE DAYS
6. BECOMING EASILY ANNOYED OR IRRITABLE: SEVERAL DAYS
1. FEELING NERVOUS, ANXIOUS, OR ON EDGE: MORE THAN HALF THE DAYS

## 2024-12-02 ENCOUNTER — VIRTUAL VISIT (OUTPATIENT)
Dept: PSYCHIATRY | Facility: CLINIC | Age: 39
End: 2024-12-02
Payer: COMMERCIAL

## 2024-12-02 VITALS — WEIGHT: 240 LBS | HEIGHT: 71 IN | BODY MASS INDEX: 33.6 KG/M2

## 2024-12-02 DIAGNOSIS — F33.42 MAJOR DEPRESSIVE DISORDER, RECURRENT, IN FULL REMISSION (H): ICD-10-CM

## 2024-12-02 DIAGNOSIS — F41.1 GAD (GENERALIZED ANXIETY DISORDER): Primary | ICD-10-CM

## 2024-12-02 ASSESSMENT — PAIN SCALES - GENERAL: PAINLEVEL_OUTOF10: NO PAIN (0)

## 2024-12-02 NOTE — NURSING NOTE
Current [address] location[address]  [place] [address]    Is the patient currently in the state of [place]? YES    Visit mode:VIDEO    If the visit is dropped, the patient can be reconnected by: VIDEO VISIT: Text to cell phone:   Telephone Information:   Mobile [phone]       Will anyone else be joining the visit? NO  (If patient encounters technical issues they should call [phone])    How would you like to obtain your AVS? MyChart    Are changes needed to the allergy or medication list? Pt stated no changes to allergies and Pt stated no med changes    Are refills needed on medications prescribed by this physician? NO    Rooming Documentation:  Questionnaire(s) completed    Reason for visit: RECHECK     Nita ENG

## 2024-12-02 NOTE — PROGRESS NOTES
"Virtual Visit Details     Type of service:  Video Visit     Originating Location (pt. Location): Home    Distant Location (provider location):  Off-site  Platform used for Video Visit: [facility] [place]       OUTPATIENT PSYCHIATRIC FOLLOW-UP      2024     Provider: PALOMA Cheek CNP      Appointment Start Time: 233 pm  Appointment End Time: 301 pm    Name: Coty Doyle   : 1985                    Preferred Name: Coty      Screening Tools           10/27/2024     9:59 AM 10/2/2024    10:42 AM 3/29/2024     2:11 PM   PHQ   PHQ-9 Total Score 5  6 2   Q9: Thoughts of better off dead/self-harm past 2 weeks Not at all  Not at all  Not at all        Patient-reported         2024     5:46 PM 3/12/2024     2:47 PM 2024     2:21 PM   BALBINA-7 SCORE   Total Score 11 (moderate anxiety) 2 (minimal anxiety) 5 (mild anxiety)   Total Score 11  2 5       Patient-reported     PROMIS 10-Global Health (only subscores and total score):       3/25/2024    11:24 AM 2024     8:42 AM 10/27/2024    10:00 AM 2024     5:47 PM   PROMIS-10 Scores Only   Global Mental Health Score 13 12 16  10    Global Physical Health Score 16 12 12  10    PROMIS TOTAL - SUBSCORES 29 24 28  20        Patient-reported          History of Present Illness      Patient attended the session alone.     Interim History:  I last saw Coty Doyle for outpatient psychiatry follow-up on 2024. During that appointment, we established care, continued plan     Current stressors include: post partum    Coping mechanisms and supports include: Family and Friends    Side effects: Denies    Medication adherence: Reports good med adherence.    Psychiatric Review of Systems      Coty Doyle reports mood has been: \"good\"    - First week post partum   - Breast pumping 8 times per day.  - Has good support system.     Depression has been:   - Fatigue.  - Burn out.   - Some isolation   - No acute depressive sx.       Anxiety has " been:   - Anxious   - Overwhelmed   - Triggered by baby crying.   - Breastfeeding stressful; will trial one more month.     Sleep has been:   - Sleep disrupted. Waking every 2hours.   - 1 AM pumps.   - Sleeping 730- 10 A while  can work from home.     Louann sxs: Denies    Psychosis sxs: Denies    ADHD sxs: Denies    PTSD sxs: Denies    SI/SIB: Denies SI/SIB/HI      Medications Prior to Appointment       Current Outpatient Medications   Medication Sig Dispense Refill    albuterol (PROAIR HFA/PROVENTIL HFA/VENTOLIN HFA) 108 (90 BASE) MCG/ACT Inhaler Inhale 2 puffs into the lungs as needed for shortness of breath or wheezing      cyanocobalamin (VITAMIN B-12) 500 MCG SUBL sublingual tablet       ferrous sulfate (FEROSUL) 325 (65 Fe) MG tablet Take 1 tablet (325 mg) by mouth daily (with breakfast). 45 tablet 0    polyethylene glycol (MIRALAX) 17 GM/Dose powder Take 17 g (1 Capful) by mouth daily. 510 g 0    Prenatal Vit-DSS-Fe Cbn-FA (PRENATAL AD PO)       senna-docusate (SENOKOT-S/PERICOLACE) 8.6-50 MG tablet Take 2 tablets by mouth daily. Start after delivery.      sertraline (ZOLOFT) 100 MG tablet Take 1 tablet (100 mg) by mouth daily. 30 tablet 2    Vitamin D, Cholecalciferol, 25 MCG (1000 UT) CAPS             Previous medication trials include but not limited to:  - sertraline                  Medication Compliance: Yes                 Pharmacogenomic Testing Completed: No      Medical History      History of head injuries: No  History of seizures: No  History of cardiac events: No  History of Tardive Dyskinesia: No      Past Medical History:   Diagnosis Date    CARDIOVASCULAR SCREENING; LDL GOAL LESS THAN 160 04/17/2012    Contraceptive management 04/18/2012    History of COVID-19     Jaw pain 12/31/2021    Nonintractable episodic headache, unspecified headache type 01/01/2022    Plantar warts 07/06/2012    Uncomplicated asthma         Surgery:   Past Surgical History:   Procedure Laterality Date     "MANDIBLE SURGERY  08/2004    ORTHOPEDIC SURGERY  2004    Jaw surgery    PE TUBES      as child.     Primary Care Provider: PEDRO MIRELES MD        Social History      Current Living situation:  [place], [place] with Spouse/Partner and son (10/24)    Current use of drugs or alcohol: Denies     Tobacco use: No    Employment: Yes: [employer] employed full time    Relationship Status:      Vitals      Not obtained d/t virtual visit.     Ht 1.791 m (5' 10.5\")   Wt 108.9 kg (240 lb)   LMP 02/10/2024   BMI 33.95 kg/m      Labs        Most recent laboratory results reviewed and pertinent results include:   Lab on 05/06/2024   Component Date Value Ref Range Status    WBC Count 05/06/2024 12.6 (H)  4.0 - 11.0 10e3/uL Final    RBC Count 05/06/2024 4.21  3.80 - 5.20 10e6/uL Final    Hemoglobin 05/06/2024 12.3  11.7 - 15.7 g/dL Final    Hematocrit 05/06/2024 36.4  35.0 - 47.0 % Final    MCV 05/06/2024 87  78 - 100 fL Final    MCH 05/06/2024 29.2  26.5 - 33.0 pg Final    MCHC 05/06/2024 33.8  31.5 - 36.5 g/dL Final    RDW 05/06/2024 14.2  10.0 - 15.0 % Final    Platelet Count 05/06/2024 216  150 - 450 10e3/uL Final    Treponema Antibody Total 05/06/2024 Nonreactive  Nonreactive Final    See Scanned Result 05/06/2024 MYRIAD NON-INVASIVE PRENATAL SCREENING PREQUEL-Scanned   Final   Prenatal Office Visit on 04/25/2024   Component Date Value Ref Range Status    Culture 04/25/2024 <10,000 CFU/mL Mixture of urogenital amalia   Final    Color Urine 04/25/2024 Yellow  Colorless, Straw, Light Yellow, Yellow Final    Appearance Urine 04/25/2024 Clear  Clear Final    Glucose Urine 04/25/2024 Negative  Negative mg/dL Final    Bilirubin Urine 04/25/2024 Negative  Negative Final    Ketones Urine 04/25/2024 Negative  Negative mg/dL Final    Specific Gravity Urine 04/25/2024 >=1.030  1.003 - 1.035 Final    Blood Urine 04/25/2024 Negative  Negative Final    pH Urine 04/25/2024 6.0  5.0 - 7.0 Final    Protein Albumin Urine 04/25/2024 " Negative  Negative mg/dL Final    Urobilinogen Urine 04/25/2024 0.2  0.2, 1.0 E.U./dL Final    Nitrite Urine 04/25/2024 Negative  Negative Final    Leukocyte Esterase Urine 04/25/2024 Negative  Negative Final   Lab on 04/14/2024   Component Date Value Ref Range Status    hCG Quantitative 04/14/2024 82,972 (H)  <5 mIU/mL Final    Adult: 0-5 mIU/mL for healthy non-pregnant person  Neonates: Should be within normal ranges by 2 days after birth     Virtual Visit on 03/28/2024   Component Date Value Ref Range Status    Hepatitis C Antibody (External) 01/23/2024 Nonreactive  Nonreactive Final    ABO (External) 01/23/2024 A   Final    Rh (External) 01/23/2024 POSITIVE   Final    HIV 1&2 Antibody (External) 01/23/2024 Nonreactive  Nonreactive Final    Rubella Antibody IgG (External) 01/23/2024 Nonreactive  Nonreactive Final   MyC Medical Advice on 03/28/2024   Component Date Value Ref Range Status    TSH (External) 01/23/2024 1.67  0.270 - 4.2 mU/L Final    Rubella Antibody IgG (External) 01/23/2024 Reactive  Nonreactive Final    80.02    Hemoglobin A1C (External) 01/23/2024 5.4  4 - 5.7 % Final    HIV 1/2 Agn Khushi 4th Gen With Reflex 01/23/2024 Non-reactive   Final    ABO (External) 01/23/2024 A   Final    Rh (External) 01/23/2024 POSITIVE   Final    Vitamin D 25-OH Total 01/23/2024 23 (A)  29 - 30 Final    Varicella Zoster Antibody IgG (Ins* 01/23/2024 2,304.00   Final    Hepatitis C Antibody (External) 01/23/2024 Nonreactive  Nonreactive Final    Hepatitis B Surface Antigen (Exter* 01/23/2024 Nonreactive  Nonreactive Final    RBC Antibody Screen 01/23/2024 None detected   Final    Prolactin 01/23/2024 18.19  4.79 - 23.3 mcg/L Final   Lab on 03/21/2024   Component Date Value Ref Range Status    Influenza A PCR 03/21/2024 Negative  Negative Final    Influenza B PCR 03/21/2024 Negative  Negative Final    RSV PCR 03/21/2024 Negative  Negative Final    SARS CoV2 PCR 03/21/2024 Negative  Negative Final    NEGATIVE: SARS-CoV-2  (COVID-19) RNA not detected, presumed negative.   Lab on 12/21/2023   Component Date Value Ref Range Status    Estradiol 12/21/2023 14  pg/mL Final    Healthy Men:   11.3-43.2 pg/mL    Healthy Postmenopausal Women:  Postmenopause: <5-138 pg/mL    Healthy Pregnant Women:  1st trimester: 154-3243 pg/mL  2nd trimester: 1561-43489 pg/mL  3rd trimester: 8525->98045 pg/mL    Healthy Women Cycle Phase:  Follicular: 30.9-90.4 pg/mL  Ovulation: 60.4-533 pg/mL  Luteal: 60.4-232 pg/mL    Healthy Women Cycle Sub-Phase:  Early Follicular: 20.5-62.8 pg/mL  Intermediate Follicular: 26-79.8 pg/mL  Late Follicular: 49.5-233 pg/mL  Ovulation: 60.4-602 pg/mL  Early Luteal: 51.1-179 pg/mL  Intermediate Luteal: 66.5-305 pg/mL  Late Luteal: 30.2-222 pg/mL    Luteinizing Hormone 12/21/2023 5.2  mIU/mL Final    FEMALE:  Age  0 - 6 mo:  <0.1-8.2 mIU/mL  6 mo - 11 years: <0.1-1.3 mIU/mL   11 - 14 years: <0.1-10 mIU/mL   14 - 19 years: 0.4-25 mIU/mL     19 years and older:   Follicular Phase: 2.4-12.6 mIU/mL  Ovulation Phase: 14.0-95.6 mIU/mL  Luteal Phase: 1.0-11.4  mIU/mL  Postmenopausal: 7.7-58.5 mIU/mL      FSH 12/21/2023 8.6  mIU/mL Final    19 years and older:   Follicular phase: 3.5-12.5 mIU/mL   Ovulation phase: 4.7-21.5 mIU/mL   Luteal phase: 1.7-7.7 mIU/mL   Postmenopause: 25.8-134.8 mIU/mL      Office Visit on 12/12/2023   Component Date Value Ref Range Status    Cholesterol 12/12/2023 249 (H)  <200 mg/dL Final    Triglycerides 12/12/2023 111  <150 mg/dL Final    Direct Measure HDL 12/12/2023 47 (L)  >=50 mg/dL Final    LDL Cholesterol Calculated 12/12/2023 180 (H)  <=100 mg/dL Final    Non HDL Cholesterol 12/12/2023 202 (H)  <130 mg/dL Final    Patient Fasting > 8hrs? 12/12/2023 Yes   Final    Lead Venous Blood 12/12/2023 <2.0  <=4.9 ug/dL Final    Comment: INTERPRETIVE INFORMATION: Lead, Blood (Venous)    Analysis performed by Inductively Coupled Plasma-Mass   Spectrometry (ICP-MS).    Elevated results may be due to skin or  "collection-related   contamination, including the use of a noncertified   lead-free tube. If contamination concerns exist due to   elevated levels of blood lead, confirmation with a second   specimen collected in a certified lead-free tube is   recommended.    Information sources for blood lead reference intervals and   interpretive comments include the CDC's \"Childhood Lead   Poisoning Prevention: Recommended Actions Based on Blood   Lead Level\" and the \"Adult Blood Lead Epidemiology and   Surveillance: Reference Blood Lead Levels (BLLs) for Adults   in the [place].\" Thresholds and time intervals for retesting,   medical evaluation, and response vary by state and   regulatory body. Contact your State Department of Health   and/or applicable regulatory agency for specific guidance   on medical management                            recommendations.    This test was developed and its performance characteristics   determined by [employer]. It has not been cleared or   approved by the U.S. Food and Drug Administration. This   test was performed in a CLIA-certified laboratory and is   intended for clinical purposes.         Group          Concentration   Comment    Children       3.5-19.9 ug/dL  Children under the age of 6                                 years are the most vulnerable                                 to the harmful effects of                                  lead exposure. Environmental                                  investigation and exposure                                  history to identify potential                                 sources of lead. Biological                                  and nutritional monitoring                                 are recommended. Follow-up                                  blood lead monitoring is                                  recommended.                                                           20-44.9 ug/dL   Lead hazard reduction and                      "             prompt medical evaluation are                                 recommended. Contact a                                  Pediatric Environmental                                  Health Specialty Unit or                                  poison control center for                                  guidance.                   Greater than    Critical. Immediate medical                  44.9 ug/dL      evaluation, including                                  detailed neurological exam is                                 recommended. Consider                                  chelation therapy when                                 symptoms of lead toxicity   are                                  present. Contact a Pediatric                                  Environmental Health                                  Specialty Unit or poison                                  control center for                                                             assistance.    Adult          5-19.9 ug/dL    Medical removal is                                  recommended for pregnant                                  women or those who are trying                                 or may become pregnant.                                  Adverse health effects are                                  possible. Reduced lead                                  exposure and increased blood                                  lead monitoring are                                  recommended.                    20-69.9 ug/dL   Adverse health effects are                                  indicated. Medical removal                                  from lead exposure is                                  required by OSHA if blood                                  lead level exceeds 50 ug/dL.                                 Prompt medical evaluation is                                 recommended.                    Greater than    Critical. Immediate medical                                              69.9 ug/dL      evaluation is recommended.                                  Consider chelation therapy                                 when symptoms of lead                                  toxicity are present.  Performed By: [facility]  [address] [place] [address]  : Teodoro Medina MD, PhD  CLIA Number: [id number]    Prolactin 12/12/2023 21  5 - 23 ng/mL Final    Anti-Mullerian Hormone 12/12/2023 0.168  0.150 - 7.500 ng/mL Final   Lab on 09/01/2023   Component Date Value Ref Range Status    Campylobacter species 09/01/2023 Negative  Negative Final    Salmonella species 09/01/2023 Negative  Negative Final    Vibrio species 09/01/2023 Negative  Negative Final    Vibrio cholerae 09/01/2023 Negative  Negative Final    Yersinia enterocolitica 09/01/2023 Negative  Negative Final    Enteropathogenic E. coli (EPEC) 09/01/2023 Negative  Negative, NA Final    Shiga-like toxin-producing E. coli* 09/01/2023 Negative  Negative Final    Shigella/Enteroinvasive E. coli (E* 09/01/2023 Negative  Negative Final    Cryptosporidium species 09/01/2023 Negative  Negative Final    Giardia lamblia 09/01/2023 Negative  Negative Final    Norovirus Gl/Gll 09/01/2023 Negative  Negative Final    Rotavirus A 09/01/2023 Negative  Negative Final    Plesiomonas shigelloides 09/01/2023 Negative  Negative Final    Enteroaggregative E. coli (EAEC) 09/01/2023 Negative  Negative Final    Enterotoxigenic E. coli (ETEC) 09/01/2023 Negative  Negative Final    E. coli O157 09/01/2023 NA  Negative, NA Final    Cyclospora cayetanensis 09/01/2023 Negative  Negative Final    Entamoeba histolytica 09/01/2023 Negative  Negative Final    Adenovirus F40/41 09/01/2023 Negative  Negative Final    Astrovirus 09/01/2023 Negative  Negative Final    Sapovirus 09/01/2023 Negative  Negative Final   Office Visit on 06/05/2023   Component Date Value Ref Range Status    TSH 06/05/2023 1.42  0.30  - 4.20 uIU/mL Final    Ferritin 06/05/2023 41  6 - 175 ng/mL Final    Sodium 06/05/2023 138  136 - 145 mmol/L Final    Potassium 06/05/2023 4.6  3.4 - 5.3 mmol/L Final    Chloride 06/05/2023 103  98 - 107 mmol/L Final    Carbon Dioxide (CO2) 06/05/2023 24  22 - 29 mmol/L Final    Anion Gap 06/05/2023 11  7 - 15 mmol/L Final    Urea Nitrogen 06/05/2023 10.1  6.0 - 20.0 mg/dL Final    Creatinine 06/05/2023 0.88  0.51 - 0.95 mg/dL Final    Calcium 06/05/2023 9.3  8.6 - 10.0 mg/dL Final    Glucose 06/05/2023 91  70 - 99 mg/dL Final    Alkaline Phosphatase 06/05/2023 37  35 - 104 U/L Final    AST 06/05/2023 21  10 - 35 U/L Final    ALT 06/05/2023 11  10 - 35 U/L Final    Protein Total 06/05/2023 7.1  6.4 - 8.3 g/dL Final    Albumin 06/05/2023 4.3  3.5 - 5.2 g/dL Final    Bilirubin Total 06/05/2023 0.3  <=1.2 mg/dL Final    GFR Estimate 06/05/2023 86  >60 mL/min/1.73m2 Final    eGFR calculated using 2021 CKD-EPI equation.    Magnesium 06/05/2023 2.0  1.7 - 2.3 mg/dL Final    WBC Count 06/05/2023 7.9  4.0 - 11.0 10e3/uL Final    RBC Count 06/05/2023 4.63  3.80 - 5.20 10e6/uL Final    Hemoglobin 06/05/2023 13.1  11.7 - 15.7 g/dL Final    Hematocrit 06/05/2023 41.4  35.0 - 47.0 % Final    MCV 06/05/2023 89  78 - 100 fL Final    MCH 06/05/2023 28.3  26.5 - 33.0 pg Final    MCHC 06/05/2023 31.6  31.5 - 36.5 g/dL Final    RDW 06/05/2023 14.6  10.0 - 15.0 % Final    Platelet Count 06/05/2023 208  150 - 450 10e3/uL Final    % Neutrophils 06/05/2023 70  % Final    % Lymphocytes 06/05/2023 19  % Final    % Monocytes 06/05/2023 7  % Final    % Eosinophils 06/05/2023 3  % Final    % Basophils 06/05/2023 1  % Final    % Immature Granulocytes 06/05/2023 0  % Final    NRBCs per 100 WBC 06/05/2023 0  <1 /100 Final    Absolute Neutrophils 06/05/2023 5.5  1.6 - 8.3 10e3/uL Final    Absolute Lymphocytes 06/05/2023 1.5  0.8 - 5.3 10e3/uL Final    Absolute Monocytes 06/05/2023 0.6  0.0 - 1.3 10e3/uL Final    Absolute Eosinophils 06/05/2023  "0.3  0.0 - 0.7 10e3/uL Final    Absolute Basophils 06/05/2023 0.1  0.0 - 0.2 10e3/uL Final    Absolute Immature Granulocytes 06/05/2023 0.0  <=0.4 10e3/uL Final    Absolute NRBCs 06/05/2023 0.0  10e3/uL Final     No EKG on file.       Medical Review of Systems      Pertinent positives noted in HPI and below:   Review of Systems   All other systems reviewed and are negative.       Contraception: None Patient's last menstrual period was 02/10/2024.  Pregnancy status: Pregnant: 6/7 weeks gestation    Mental Status Exam      Appearance: awake, alert, adequately groomed, appeared stated age and no apparent distress  Attitude:  cooperative   Eye Contact:  good  Gait and Station: normal, no gross abnormalities noted by observation  Psychomotor Behavior:  no evidence of tardive dyskinesia, dystonia, or tics  Oriented to:  person, place, time, and situation  Attention Span and Concentration:  normal  Speech:  clear, coherent, regular rate, rhythm, and volume  Language: intact  Mood: \"tired\"  Affect:  appropriate and in normal range  Associations:  no loose associations  Thought Process:  logical, linear and goal oriented  Thought Content:  no evidence of suicidal ideation or homicidal ideation, no evidence of psychotic thought, no auditory hallucinations present and no visual hallucinations present  Recent and Remote Memory:  Intact to interview. Not formally assessed. No amnesia.  Fund of Knowledge: appropriate  Insight: Partial to full  Judgment:  intact, adequate for safety  Impulse Control:  intact            Risk Assessment       Suicide assessment  Acute: Low  Chronic:Low  Imminent: Low     Risk factors  History of suicide attempts: No  History of self-injurious behavior: No  Beau. Axis I psychiatric diagnoses: Yes  Substance use disorder: No  Symptoms:  feeling inadequate  Family history of completed suicide or attempted suicide: No  Accessibility to firearms: No  Interpersonal factors: Pregnancy     Protective " factors  Ability to cope with the stress: Yes  Family responsibility and supportive:Yes  Positive therapeutic relationships: Yes  Social support:Yes  Restorationist beliefs:  No  Connectedness with mental health providers: Yes     Homicidal Risk  Acute: Low  Chronic: Low  Imminent: Low    Assessment     Coty Doyle has a past psychiatry history of depression and anxiety who has been referred for medication management from primary care provider.  No previous psychiatric inpatient hospitalizations.  Denies history of suicidal ideation.  Denies history of suicide attempts.  Denies history of self harming behaviors.  No overt concerns regarding chemical health history.  Did experience difficult childhood.     Coty Doyle reports increased anxiety and sense of being overwhelmed since birth of son.  No signs or symptoms of postpartum depression.  Navigating sleep which has been challenging.  Feels if sleep has stability with child's on schedule this would be beneficial.  MyChart message from last week we did advance sertraline to 100 mg.  Did not start this and wanted to discuss at today's appointment.  Will continue with this change.  Reviewed risk versus benefit and side effects.  He is going to graduate from DBT group therapy to allow more time during the day/week.  Has been engaged in this program for about 2 years.  Has started with new individual DBT therapist.  We did discuss other support options through [employer] and her [employer] groups for their IOP versus PHP groups but will hold off for now.  No imminent safety concerns identified today.  Discussed breast-feeding at length today.  This has been a stressor.  Will continue for the next 1 month and will reassess.    Pharmacologic:   11/23/24: + sertraline 100mg     -Continue sertraline 100 mg by mouth every bedtime        Psychosocial: Would benefit from individual therapy with focus of Dialectical Behavior Therapy (DBT). DBT would be  helpful in addressing emotional regulation, distress tolerance, mindfulness, and interpersonal effectiveness.   - Has individual therapist. Weekly.   - topped DBT group to free up time             Diagnosis       Generalized anxiety disorder  Major depressive disorder, recurrent, full remission     Plan         1) Medications:   -Sertraline 100 mg by mouth every bedtime              MNPMP: I have queried the [place] and/or [place] Prescription Monitoring Program for this patient for the preceding 12 months, or reviewed the report provided by my proxy delegate. I have not identified any concerns.  2) Risk vs benefits of medications reviewed: Yes  3) Life style modifications: sleep hygiene, exercise, healthy diet  4) Medical concerns:   -Breastfeeding.      5) Others   -Continue individual therapy  6) Refrain from drinking alcohol and/or use of drugs.   7) Please secure all prescription and OTC medications, sharps, and caustic substances. Please remove all firearms and ammunition.  8) Review outside records, get JUSTINO's, coordinate with outside providers  - Obtain verbal JUSTINO for    9) In case of emergency call 911 or go to the nearest ER, this includes patient voicing thought of harming self or others as well as additional safety concerns   10) Follow-up: 6-8 weeks, or sooner if needed.    Administrative Billing:   Supportive therapy was provided, focusing on reflective listening and solution focused problem solving.    Total time preparing to see this patient, face-to-face time, documenting in the EHR, and coordinating care time on the same calendar date: 36 minutes    The longitudinal plan of care for the diagnosis(es)/condition(s) as documented were addressed during this visit. Due to the added complexity in care, I will continue to support Coty in the subsequent management and with ongoing continuity of care.      Signed:   PALOMA Cheek CNP on 12/3/2024 at 9:50 AM     Disclaimer: This note consists of symbols  derived from keyboarding, dictation and/or voice recognition software. As a result, there may be errors in the script that have gone undetected. Please consider this when interpreting information found in this chart.

## 2024-12-16 ENCOUNTER — THERAPY VISIT (OUTPATIENT)
Dept: PHYSICAL THERAPY | Facility: CLINIC | Age: 39
End: 2024-12-16
Payer: COMMERCIAL

## 2024-12-16 DIAGNOSIS — G89.29 CHRONIC MIDLINE LOW BACK PAIN WITHOUT SCIATICA: ICD-10-CM

## 2024-12-16 DIAGNOSIS — M62.08 DIASTASIS OF RECTUS ABDOMINIS: Primary | ICD-10-CM

## 2024-12-16 DIAGNOSIS — M54.50 CHRONIC MIDLINE LOW BACK PAIN WITHOUT SCIATICA: ICD-10-CM

## 2024-12-16 DIAGNOSIS — M99.05 SOMATIC DYSFUNCTION OF PELVIC REGION: ICD-10-CM

## 2024-12-16 PROCEDURE — 97110 THERAPEUTIC EXERCISES: CPT | Mod: GP

## 2024-12-30 ENCOUNTER — PRENATAL OFFICE VISIT (OUTPATIENT)
Dept: OBGYN | Facility: CLINIC | Age: 39
End: 2024-12-30
Payer: COMMERCIAL

## 2024-12-30 ENCOUNTER — MYC MEDICAL ADVICE (OUTPATIENT)
Dept: OBGYN | Facility: CLINIC | Age: 39
End: 2024-12-30

## 2024-12-30 VITALS
BODY MASS INDEX: 35.93 KG/M2 | HEART RATE: 90 BPM | OXYGEN SATURATION: 98 % | DIASTOLIC BLOOD PRESSURE: 76 MMHG | SYSTOLIC BLOOD PRESSURE: 109 MMHG | WEIGHT: 254 LBS

## 2024-12-30 DIAGNOSIS — O92.4 DECREASED MILK PRODUCTION: ICD-10-CM

## 2024-12-30 DIAGNOSIS — F33.41 MAJOR DEPRESSIVE DISORDER, RECURRENT EPISODE, IN PARTIAL REMISSION (H): ICD-10-CM

## 2024-12-30 DIAGNOSIS — F41.1 GAD (GENERALIZED ANXIETY DISORDER): ICD-10-CM

## 2024-12-30 DIAGNOSIS — N95.2 VAGINAL ATROPHY: ICD-10-CM

## 2024-12-30 DIAGNOSIS — Z13.29 SCREENING FOR THYROID DISORDER: ICD-10-CM

## 2024-12-30 DIAGNOSIS — F32.A DEPRESSION, UNSPECIFIED DEPRESSION TYPE: ICD-10-CM

## 2024-12-30 DIAGNOSIS — Z13.0 SCREENING, ANEMIA, DEFICIENCY, IRON: Primary | ICD-10-CM

## 2024-12-30 LAB
ERYTHROCYTE [DISTWIDTH] IN BLOOD BY AUTOMATED COUNT: 14.1 % (ref 10–15)
FERRITIN SERPL-MCNC: 32 NG/ML (ref 6–175)
HCT VFR BLD AUTO: 37.3 % (ref 35–47)
HGB BLD-MCNC: 12 G/DL (ref 11.7–15.7)
IRON BINDING CAPACITY (ROCHE): 297 UG/DL (ref 240–430)
IRON SATN MFR SERPL: 9 % (ref 15–46)
IRON SERPL-MCNC: 27 UG/DL (ref 37–145)
MCH RBC QN AUTO: 27.4 PG (ref 26.5–33)
MCHC RBC AUTO-ENTMCNC: 32.2 G/DL (ref 31.5–36.5)
MCV RBC AUTO: 85 FL (ref 78–100)
PLATELET # BLD AUTO: 232 10E3/UL (ref 150–450)
PROLACTIN SERPL 3RD IS-MCNC: 108 NG/ML (ref 5–23)
RBC # BLD AUTO: 4.38 10E6/UL (ref 3.8–5.2)
T4 FREE SERPL-MCNC: 0.83 NG/DL (ref 0.9–1.7)
TSH SERPL DL<=0.005 MIU/L-ACNC: 1.75 UIU/ML (ref 0.3–4.2)
WBC # BLD AUTO: 7.4 10E3/UL (ref 4–11)

## 2024-12-30 PROCEDURE — 84443 ASSAY THYROID STIM HORMONE: CPT | Performed by: OBSTETRICS & GYNECOLOGY

## 2024-12-30 PROCEDURE — 83540 ASSAY OF IRON: CPT | Performed by: OBSTETRICS & GYNECOLOGY

## 2024-12-30 PROCEDURE — 84146 ASSAY OF PROLACTIN: CPT | Performed by: OBSTETRICS & GYNECOLOGY

## 2024-12-30 PROCEDURE — 84439 ASSAY OF FREE THYROXINE: CPT | Performed by: OBSTETRICS & GYNECOLOGY

## 2024-12-30 PROCEDURE — 36415 COLL VENOUS BLD VENIPUNCTURE: CPT | Performed by: OBSTETRICS & GYNECOLOGY

## 2024-12-30 PROCEDURE — 82728 ASSAY OF FERRITIN: CPT | Performed by: OBSTETRICS & GYNECOLOGY

## 2024-12-30 PROCEDURE — 85027 COMPLETE CBC AUTOMATED: CPT | Performed by: OBSTETRICS & GYNECOLOGY

## 2024-12-30 PROCEDURE — 83550 IRON BINDING TEST: CPT | Performed by: OBSTETRICS & GYNECOLOGY

## 2024-12-30 RX ORDER — SERTRALINE HYDROCHLORIDE 100 MG/1
100 TABLET, FILM COATED ORAL DAILY
Qty: 90 TABLET | Refills: 4 | Status: SHIPPED | OUTPATIENT
Start: 2024-12-30

## 2024-12-30 RX ORDER — ESTRADIOL 0.1 MG/G
2 CREAM VAGINAL
Qty: 42.5 G | Refills: 5 | Status: SHIPPED | OUTPATIENT
Start: 2024-12-30

## 2024-12-30 ASSESSMENT — PATIENT HEALTH QUESTIONNAIRE - PHQ9
SUM OF ALL RESPONSES TO PHQ QUESTIONS 1-9: 6
5. POOR APPETITE OR OVEREATING: SEVERAL DAYS

## 2024-12-30 ASSESSMENT — ANXIETY QUESTIONNAIRES
IF YOU CHECKED OFF ANY PROBLEMS ON THIS QUESTIONNAIRE, HOW DIFFICULT HAVE THESE PROBLEMS MADE IT FOR YOU TO DO YOUR WORK, TAKE CARE OF THINGS AT HOME, OR GET ALONG WITH OTHER PEOPLE: SOMEWHAT DIFFICULT
GAD7 TOTAL SCORE: 7
1. FEELING NERVOUS, ANXIOUS, OR ON EDGE: SEVERAL DAYS
5. BEING SO RESTLESS THAT IT IS HARD TO SIT STILL: NOT AT ALL
7. FEELING AFRAID AS IF SOMETHING AWFUL MIGHT HAPPEN: NOT AT ALL
6. BECOMING EASILY ANNOYED OR IRRITABLE: SEVERAL DAYS
3. WORRYING TOO MUCH ABOUT DIFFERENT THINGS: MORE THAN HALF THE DAYS
2. NOT BEING ABLE TO STOP OR CONTROL WORRYING: MORE THAN HALF THE DAYS
GAD7 TOTAL SCORE: 7

## 2024-12-30 NOTE — PROGRESS NOTES
SUBJECTIVE:  Coty Doyle is a 39 year old female  here for a postpartum visit.  She had a  on 10/28/24 delivering a healthy baby boy weighing 6 lbs 13 oz at term.      PHQ=6 and BALBINA=7 on zoloft 100 mg and doing well    delivery complications:  No--PROM and 3a lac  breast feeding:  Yes, but low supply and pumping small amounts, giving bottle and supplementing formula  bladder problems:  No  bowel problems/hemorrhoids:  No  episiotomy/laceration/incision healed? Yes  vaginal flow:  None  [employer]:  No  contraception:  condoms  emotional adjustment:  doing well    OBJECTIVE:  Blood pressure 109/76, pulse 90, weight 115.2 kg (254 lb), last menstrual period 02/10/2024, SpO2 98%, currently breastfeeding.   General - pleasant female in no acute distress.  Breast - deferred  Abdomen - soft, nontender, nondistended, no hepatosplenomegaly.  Pelvic - EG: labia minora fused to majora and clitoral rodriguez fused over clitoris (see images), BUS: within normal limits, Vagina: well rugated, no discharge, Cervix: no lesions or CMT (cervical polyp is gone), Uterus: firm, normal sized and nontender, Adnexae: no masses or tenderness.  Rectovaginal - deferred.    ASSESSMENT:  normal postpartum exam  Vaginal atrophy    PLAN:  Discussed kegel exercises --no tone on exam today  May resume normal activities without restrictions  Pap smear was not  done today    Start topical vaginal estrogen to tissue to try and separate the fused clitoral rodriguez.     The patient will use condoms for birth control. Full counseling was provided, and all questions answered. Compliance is strongly emphasized.  Return to clinic in one year for an annual or PRN.      [place]   1. Physical and labs --one year with annual and if not breastfeeding can check cholesterol then  2. Hyperthyroid concerns --check labs today  3. Anemia concerns --check labs today and see if still need to take iron supplement  4. Polyp check and treatment plan is still there  --not there anymore  5. Medication and vitamin review --continue zoloft 100 mg and refill sent for one year  6. Cholesterol should I be on medication? --not while breas tpumping  7. Breast feeding update --can stop pumping when you are ready, prolactin checked today  8. I started getting knee pain is that common after birth? --low estrogen and should get better done breast pumping  PEDRO MIRELES MD

## 2025-01-02 NOTE — TELEPHONE ENCOUNTER
We should be able to fax her delivery note and discharge summary please.    She is still very concerned about this and not sure who else she should really be talking to.    Katerine Leonard MD

## 2025-01-06 ENCOUNTER — THERAPY VISIT (OUTPATIENT)
Dept: PHYSICAL THERAPY | Facility: CLINIC | Age: 40
End: 2025-01-06
Payer: COMMERCIAL

## 2025-01-06 DIAGNOSIS — M54.50 CHRONIC MIDLINE LOW BACK PAIN WITHOUT SCIATICA: ICD-10-CM

## 2025-01-06 DIAGNOSIS — M62.08 DIASTASIS OF RECTUS ABDOMINIS: Primary | ICD-10-CM

## 2025-01-06 DIAGNOSIS — M99.05 SOMATIC DYSFUNCTION OF PELVIC REGION: ICD-10-CM

## 2025-01-06 DIAGNOSIS — G89.29 CHRONIC MIDLINE LOW BACK PAIN WITHOUT SCIATICA: ICD-10-CM

## 2025-01-06 PROCEDURE — 97530 THERAPEUTIC ACTIVITIES: CPT | Mod: GP

## 2025-01-06 PROCEDURE — 97110 THERAPEUTIC EXERCISES: CPT | Mod: GP

## 2025-01-08 ENCOUNTER — TELEPHONE (OUTPATIENT)
Dept: FAMILY MEDICINE | Facility: CLINIC | Age: 40
End: 2025-01-08
Payer: COMMERCIAL

## 2025-01-08 NOTE — TELEPHONE ENCOUNTER
Pt called to inquire on the status of a medical necessity letter regarding date of service 10/28/24 (delivery of her child).   As pt looked through insurance information, she realized she has communication from insurance that the aforementioned letter has been already received and is being processed.   No action needed from treatment team at this time.   Pt was appreciative of writer's time.   Thalia ANDERSEN RN

## 2025-01-12 ENCOUNTER — MYC MEDICAL ADVICE (OUTPATIENT)
Dept: OBGYN | Facility: CLINIC | Age: 40
End: 2025-01-12
Payer: COMMERCIAL

## 2025-01-14 ENCOUNTER — MYC MEDICAL ADVICE (OUTPATIENT)
Dept: OBGYN | Facility: CLINIC | Age: 40
End: 2025-01-14

## 2025-01-20 ENCOUNTER — THERAPY VISIT (OUTPATIENT)
Dept: PHYSICAL THERAPY | Facility: CLINIC | Age: 40
End: 2025-01-20
Payer: COMMERCIAL

## 2025-01-20 DIAGNOSIS — M62.08 DIASTASIS OF RECTUS ABDOMINIS: Primary | ICD-10-CM

## 2025-01-20 DIAGNOSIS — M54.50 CHRONIC MIDLINE LOW BACK PAIN WITHOUT SCIATICA: ICD-10-CM

## 2025-01-20 DIAGNOSIS — G89.29 CHRONIC MIDLINE LOW BACK PAIN WITHOUT SCIATICA: ICD-10-CM

## 2025-01-20 DIAGNOSIS — M99.05 SOMATIC DYSFUNCTION OF PELVIC REGION: ICD-10-CM

## 2025-01-20 PROCEDURE — 97530 THERAPEUTIC ACTIVITIES: CPT | Mod: GP

## 2025-01-20 PROCEDURE — 97110 THERAPEUTIC EXERCISES: CPT | Mod: GP

## 2025-01-23 ENCOUNTER — MYC MEDICAL ADVICE (OUTPATIENT)
Dept: OBGYN | Facility: CLINIC | Age: 40
End: 2025-01-23

## 2025-01-25 ENCOUNTER — HEALTH MAINTENANCE LETTER (OUTPATIENT)
Age: 40
End: 2025-01-25

## 2025-02-05 ENCOUNTER — MYC MEDICAL ADVICE (OUTPATIENT)
Dept: PSYCHIATRY | Facility: CLINIC | Age: 40
End: 2025-02-05
Payer: COMMERCIAL

## 2025-02-05 DIAGNOSIS — F33.41 MAJOR DEPRESSIVE DISORDER, RECURRENT EPISODE, IN PARTIAL REMISSION: ICD-10-CM

## 2025-02-05 DIAGNOSIS — F41.1 GAD (GENERALIZED ANXIETY DISORDER): ICD-10-CM

## 2025-02-05 RX ORDER — SERTRALINE HYDROCHLORIDE 100 MG/1
100 TABLET, FILM COATED ORAL DAILY
Qty: 90 TABLET | Refills: 0 | Status: SHIPPED | OUTPATIENT
Start: 2025-02-05 | End: 2025-05-06

## 2025-02-05 NOTE — TELEPHONE ENCOUNTER
Date of Last Office Visit: 12/2/24  Date of Next Office Visit:  2/28/25  No shows since last visit: No  More than one patient-initiated cancellation (with reschedule) since last seen in clinic? Yes 1/28/25    []Medication refilled per  Medication Refill in Ambulatory Care  policy.  [x]Medication unable to be refilled by RN due to criteria not met as indicated below:    []Eligibility: has not had a provider visit within last 6 months   []Supervision: no future appointment; < 7 days before next appointment   []Compliance: no shows; cancellations; lapse in therapy   []Verification: order discrepancy; may need modification...   [x] > 30-day supply request   []Advanced refill request: > 7 days before refill date   []Controlled medication   []Medication not included in policy   []Review: new med; med adjusted <= 30 days; safety alert; requires lab monitoring...   []Scope of Practice: refill request processed by LPN/MA   []Other:      Medication(s) requested:     -  sertraline (ZOLOFT) 100 MG tablet   Date last ordered: 1/8/25  Qty: 30  Refills: 0  Appropriate for refill? Provider to review.          Any Controlled Substance(s)? No      Requested medication(s) verified as identical to current order? Yes    Any lapse in adherence to medication(s) greater than 5 days? No      Additional action taken? routed encounter to provider for review.      Last visit treatment plan:   Plan         1) Medications:   -Sertraline 100 mg by mouth every bedtime              [place]MP: I have queried the [place] and/or [place] Prescription Monitoring Program for this patient for the preceding 12 months, or reviewed the report provided by my proxy delegate. I have not identified any concerns.  2) Risk vs benefits of medications reviewed: Yes  3) Life style modifications: sleep hygiene, exercise, healthy diet  4) Medical concerns:   -Breastfeeding.      5) Others   -Continue individual therapy  6) Refrain from drinking alcohol and/or use of drugs.   7)  Please secure all prescription and OTC medications, sharps, and caustic substances. Please remove all firearms and ammunition.  8) Review outside records, get JUSTINO's, coordinate with outside providers  - Obtain verbal JUSTINO for    9) In case of emergency call 911 or go to the nearest ER, this includes patient voicing thought of harming self or others as well as additional safety concerns   10) Follow-up: 6-8 weeks, or sooner if needed.    Any medication(s) require lab monitoring? No    Charlie Morales, RN on 2/5/2025 at 3:11 PM

## 2025-02-14 NOTE — TELEPHONE ENCOUNTER
14w6d    Pt submitted e-visit for urinary symptoms but recommended urgent care (I assume since she's pregnant?)  Pended UA if appropriate.  Routing to provider to advise.  Dannielle Medeiros RN on 2024 at 12:24 PM     [NL] : warm, clear [FreeTextEntry4] : mild congestion

## 2025-02-17 ENCOUNTER — MYC MEDICAL ADVICE (OUTPATIENT)
Dept: OBGYN | Facility: CLINIC | Age: 40
End: 2025-02-17
Payer: COMMERCIAL

## 2025-02-22 ENCOUNTER — E-VISIT (OUTPATIENT)
Dept: OBGYN | Facility: CLINIC | Age: 40
End: 2025-02-22
Payer: COMMERCIAL

## 2025-02-22 ENCOUNTER — MYC MEDICAL ADVICE (OUTPATIENT)
Dept: OBGYN | Facility: CLINIC | Age: 40
End: 2025-02-22

## 2025-02-22 DIAGNOSIS — R63.39 DIFFICULTY WEANING FROM BREAST: ICD-10-CM

## 2025-02-22 DIAGNOSIS — F41.9 ANXIETY: Primary | ICD-10-CM

## 2025-02-22 PROCEDURE — 99421 OL DIG E/M SVC 5-10 MIN: CPT | Performed by: OBSTETRICS & GYNECOLOGY

## 2025-02-24 RX ORDER — CABERGOLINE 0.5 MG/1
1 TABLET ORAL ONCE
Qty: 2 TABLET | Refills: 0 | Status: SHIPPED | OUTPATIENT
Start: 2025-02-24 | End: 2025-02-24

## 2025-02-24 RX ORDER — BUPROPION HYDROCHLORIDE 150 MG/1
150 TABLET ORAL EVERY MORNING
Qty: 90 TABLET | Refills: 4 | Status: SHIPPED | OUTPATIENT
Start: 2025-02-24

## 2025-02-24 ASSESSMENT — ANXIETY QUESTIONNAIRES
GAD7 TOTAL SCORE: 19
7. FEELING AFRAID AS IF SOMETHING AWFUL MIGHT HAPPEN: NEARLY EVERY DAY
6. BECOMING EASILY ANNOYED OR IRRITABLE: SEVERAL DAYS
4. TROUBLE RELAXING: NEARLY EVERY DAY
GAD7 TOTAL SCORE: 19
1. FEELING NERVOUS, ANXIOUS, OR ON EDGE: NEARLY EVERY DAY
2. NOT BEING ABLE TO STOP OR CONTROL WORRYING: NEARLY EVERY DAY
GAD7 TOTAL SCORE: 19
8. IF YOU CHECKED OFF ANY PROBLEMS, HOW DIFFICULT HAVE THESE MADE IT FOR YOU TO DO YOUR WORK, TAKE CARE OF THINGS AT HOME, OR GET ALONG WITH OTHER PEOPLE?: VERY DIFFICULT
5. BEING SO RESTLESS THAT IT IS HARD TO SIT STILL: NEARLY EVERY DAY
1. FEELING NERVOUS, ANXIOUS, OR ON EDGE: NEARLY EVERY DAY
3. WORRYING TOO MUCH ABOUT DIFFERENT THINGS: NEARLY EVERY DAY
7. FEELING AFRAID AS IF SOMETHING AWFUL MIGHT HAPPEN: NEARLY EVERY DAY
8. IF YOU CHECKED OFF ANY PROBLEMS, HOW DIFFICULT HAVE THESE MADE IT FOR YOU TO DO YOUR WORK, TAKE CARE OF THINGS AT HOME, OR GET ALONG WITH OTHER PEOPLE?: VERY DIFFICULT
5. BEING SO RESTLESS THAT IT IS HARD TO SIT STILL: NEARLY EVERY DAY
7. FEELING AFRAID AS IF SOMETHING AWFUL MIGHT HAPPEN: NEARLY EVERY DAY
4. TROUBLE RELAXING: NEARLY EVERY DAY
3. WORRYING TOO MUCH ABOUT DIFFERENT THINGS: NEARLY EVERY DAY
IF YOU CHECKED OFF ANY PROBLEMS ON THIS QUESTIONNAIRE, HOW DIFFICULT HAVE THESE PROBLEMS MADE IT FOR YOU TO DO YOUR WORK, TAKE CARE OF THINGS AT HOME, OR GET ALONG WITH OTHER PEOPLE: VERY DIFFICULT
7. FEELING AFRAID AS IF SOMETHING AWFUL MIGHT HAPPEN: NEARLY EVERY DAY
2. NOT BEING ABLE TO STOP OR CONTROL WORRYING: NEARLY EVERY DAY
IF YOU CHECKED OFF ANY PROBLEMS ON THIS QUESTIONNAIRE, HOW DIFFICULT HAVE THESE PROBLEMS MADE IT FOR YOU TO DO YOUR WORK, TAKE CARE OF THINGS AT HOME, OR GET ALONG WITH OTHER PEOPLE: VERY DIFFICULT
GAD7 TOTAL SCORE: 19
6. BECOMING EASILY ANNOYED OR IRRITABLE: SEVERAL DAYS

## 2025-02-24 ASSESSMENT — PATIENT HEALTH QUESTIONNAIRE - PHQ9
SUM OF ALL RESPONSES TO PHQ QUESTIONS 1-9: 17
10. IF YOU CHECKED OFF ANY PROBLEMS, HOW DIFFICULT HAVE THESE PROBLEMS MADE IT FOR YOU TO DO YOUR WORK, TAKE CARE OF THINGS AT HOME, OR GET ALONG WITH OTHER PEOPLE: VERY DIFFICULT
SUM OF ALL RESPONSES TO PHQ QUESTIONS 1-9: 15
10. IF YOU CHECKED OFF ANY PROBLEMS, HOW DIFFICULT HAVE THESE PROBLEMS MADE IT FOR YOU TO DO YOUR WORK, TAKE CARE OF THINGS AT HOME, OR GET ALONG WITH OTHER PEOPLE: VERY DIFFICULT
SUM OF ALL RESPONSES TO PHQ QUESTIONS 1-9: 17
SUM OF ALL RESPONSES TO PHQ QUESTIONS 1-9: 15

## 2025-02-24 NOTE — TELEPHONE ENCOUNTER
Patient delivered 10/28/24  Returned to work on 2/3/25    Patient wondering if you are in support (filling out paperwork/letter) for STD due to following concerns listed.    Looks like patient does have a therapist and is taking 100mg Zoloft daily.    PHQ9 and GAD7 sent.    Do you want a follow up visit with her to check in?  Should she follow up with PCP for STD based on concerns?    Patt Topete RN

## 2025-02-24 NOTE — TELEPHONE ENCOUNTER
Provider E-Visit time total (minutes): 10 minutes            12/30/2024    10:06 AM 2/24/2025     2:56 AM 2/24/2025     8:57 AM   PHQ   PHQ-9 Total Score 6 17  15    Q9: Thoughts of better off dead/self-harm past 2 weeks Not at all Not at all Not at all       Patient-reported            12/30/2024    10:06 AM 2/24/2025     2:56 AM 2/24/2025     8:58 AM   BALBINA-7 SCORE   Total Score  19 (severe anxiety) 19 (severe anxiety)   Total Score 7 19  19        Patient-reported

## 2025-02-25 ASSESSMENT — PATIENT HEALTH QUESTIONNAIRE - PHQ9: SUM OF ALL RESPONSES TO PHQ QUESTIONS 1-9: 17

## 2025-03-11 ENCOUNTER — MYC MEDICAL ADVICE (OUTPATIENT)
Dept: OBGYN | Facility: CLINIC | Age: 40
End: 2025-03-11
Payer: COMMERCIAL

## 2025-04-07 ENCOUNTER — VIRTUAL VISIT (OUTPATIENT)
Dept: PSYCHIATRY | Facility: CLINIC | Age: 40
End: 2025-04-07
Payer: COMMERCIAL

## 2025-04-07 DIAGNOSIS — F33.41 MAJOR DEPRESSIVE DISORDER, RECURRENT EPISODE, IN PARTIAL REMISSION: ICD-10-CM

## 2025-04-07 DIAGNOSIS — F41.1 GAD (GENERALIZED ANXIETY DISORDER): ICD-10-CM

## 2025-04-07 PROCEDURE — G2211 COMPLEX E/M VISIT ADD ON: HCPCS | Mod: 95 | Performed by: NURSE PRACTITIONER

## 2025-04-07 PROCEDURE — 98006 SYNCH AUDIO-VIDEO EST MOD 30: CPT | Performed by: NURSE PRACTITIONER

## 2025-04-07 RX ORDER — SERTRALINE HYDROCHLORIDE 100 MG/1
100 TABLET, FILM COATED ORAL DAILY
Qty: 90 TABLET | Refills: 0 | Status: SHIPPED | OUTPATIENT
Start: 2025-04-07

## 2025-04-07 NOTE — NURSING NOTE
Current patient location: [address] [place] [address]    Is the patient currently in the state of [place]? YES    Visit mode: VIDEO    If the visit is dropped, the patient can be reconnected by:VIDEO VISIT: Text to cell phone:   Telephone Information:   Mobile [phone]       Will anyone else be joining the visit? NO  (If patient encounters technical issues they should call [phone]    Are changes needed to the allergy or medication list? Pt stated no changes to allergies and Pt stated no med changes    Are refills needed on medications prescribed by this physician? Discuss with provider    Rooming Documentation:  Questionnaire(s) completed    Reason for visit: RECHECK    Pamela ENG

## 2025-04-07 NOTE — PROGRESS NOTES
"Virtual Visit Details     Type of service:  Video Visit     Originating Location (pt. Location): Home    Distant Location (provider location):  Off-site  Platform used for Video Visit: [facility]       OUTPATIENT PSYCHIATRIC FOLLOW-UP      2025     Provider: PALOMA Cheek CNP      Appointment Start Time: 804  Appointment End Time: 829    Name: Coty Doyle   : 1985                    Preferred Name: Coty      Screening Tools           2025     8:18 AM 2025     8:57 AM 2025     2:56 AM   PHQ   PHQ-9 Total Score 14 15  17    Q9: Thoughts of better off dead/self-harm past 2 weeks Not at all Not at all Not at all       Patient-reported         2025     8:58 AM 2025     2:56 AM 2024    10:06 AM   BALBINA-7 SCORE   Total Score 19 (severe anxiety) 19 (severe anxiety)    Total Score 19  19  7       Patient-reported     PROMIS 10-Global Health (only subscores and total score):       3/25/2024    11:24 AM 2024     8:42 AM 10/27/2024    10:00 AM 2024     5:47 PM 2025    10:58 AM   PROMIS-10 Scores Only   Global Mental Health Score 13 12 16  10  10    Global Physical Health Score 16 12 12  10  12    PROMIS TOTAL - SUBSCORES 29 24 28  20  22        Patient-reported          History of Present Illness      Patient attended the session alone.     Interim History:  I last saw Coty Doyle for outpatient psychiatry follow-up on 25. During that appointment, we continued plan    Current stressors include: post partum, work, finances    Coping mechanisms and supports include: Family and Friends    Side effects: Denies    Medication adherence: Reports good med adherence.    Psychiatric Review of Systems      Coty Doyle reports mood has been: \"better\"    - Work going well.   - Processing friendship    Depression has been:   - Fatigue.  - Emotions can fluctuate.     Anxiety has been:   - External stressors.   - Worried about political climate  - " "Waiting \"for pin to drop\".     Sleep has been:   - Switched shifts with . Helpful.   - Sleep less disrupted.     Louann sxs: Denies    Psychosis sxs: Denies    ADHD sxs: Denies    PTSD sxs: Denies    SI/SIB: Denies SI/SIB/HI      Medications Prior to Appointment       Current Outpatient Medications   Medication Sig Dispense Refill    albuterol (PROAIR HFA/PROVENTIL HFA/VENTOLIN HFA) 108 (90 BASE) MCG/ACT Inhaler Inhale 2 puffs into the lungs as needed for shortness of breath or wheezing      buPROPion (WELLBUTRIN XL) 150 MG 24 hr tablet Take 1 tablet (150 mg) by mouth every morning. 90 tablet 4    cabergoline (DOSTINEX) 0.5 MG tablet Take 2 tablets (1 mg) by mouth once for 1 dose. 2 tablet 0    cyanocobalamin (VITAMIN B-12) 500 MCG SUBL sublingual tablet       estradiol (ESTRACE) 0.1 MG/GM vaginal cream Place 2 g vaginally three times a week. 42.5 g 5    ferrous sulfate (FEROSUL) 325 (65 Fe) MG tablet Take 1 tablet (325 mg) by mouth daily (with breakfast). 45 tablet 0    polyethylene glycol (MIRALAX) 17 GM/Dose powder Take 17 g (1 Capful) by mouth daily. 510 g 0    Prenatal Vit-DSS-Fe Cbn-FA (PRENATAL AD PO)       senna-docusate (SENOKOT-S/PERICOLACE) 8.6-50 MG tablet Take 2 tablets by mouth daily. Start after delivery.      sertraline (ZOLOFT) 100 MG tablet Take 1 tablet (100 mg) by mouth daily. 90 tablet 0    Vitamin D, Cholecalciferol, 25 MCG (1000 UT) CAPS             Previous medication trials include but not limited to:  - sertraline                  Medication Compliance: Yes                 Pharmacogenomic Testing Completed: No      Medical History      History of head injuries: No  History of seizures: No  History of cardiac events: No  History of Tardive Dyskinesia: No      Past Medical History:   Diagnosis Date    CARDIOVASCULAR SCREENING; LDL GOAL LESS THAN 160 04/17/2012    Contraceptive management 04/18/2012    History of COVID-19     Jaw pain 12/31/2021    Nonintractable episodic headache, " unspecified headache type 01/01/2022    Plantar warts 07/06/2012    Uncomplicated asthma         Surgery:   Past Surgical History:   Procedure Laterality Date    MANDIBLE SURGERY  08/2004    ORTHOPEDIC SURGERY  2004    Jaw surgery    PE TUBES      as child.     Primary Care Provider: PEDRO MIRELES MD        Social History      Current Living situation:  [place], [place] with Spouse/Partner and son (10/24)    Current use of drugs or alcohol: Denies     Tobacco use: No    Employment: Yes: [employer] employed full time    Relationship Status:      Vitals      Not obtained d/t virtual visit.     There were no vitals taken for this visit.    Labs        Most recent laboratory results reviewed and pertinent results include:   Lab on 05/06/2024   Component Date Value Ref Range Status    WBC Count 05/06/2024 12.6 (H)  4.0 - 11.0 10e3/uL Final    RBC Count 05/06/2024 4.21  3.80 - 5.20 10e6/uL Final    Hemoglobin 05/06/2024 12.3  11.7 - 15.7 g/dL Final    Hematocrit 05/06/2024 36.4  35.0 - 47.0 % Final    MCV 05/06/2024 87  78 - 100 fL Final    MCH 05/06/2024 29.2  26.5 - 33.0 pg Final    MCHC 05/06/2024 33.8  31.5 - 36.5 g/dL Final    RDW 05/06/2024 14.2  10.0 - 15.0 % Final    Platelet Count 05/06/2024 216  150 - 450 10e3/uL Final    Treponema Antibody Total 05/06/2024 Nonreactive  Nonreactive Final    See Scanned Result 05/06/2024 MYRIAD NON-INVASIVE PRENATAL SCREENING PREQUEL-Scanned   Final   Prenatal Office Visit on 04/25/2024   Component Date Value Ref Range Status    Culture 04/25/2024 <10,000 CFU/mL Mixture of urogenital amalia   Final    Color Urine 04/25/2024 Yellow  Colorless, Straw, Light Yellow, Yellow Final    Appearance Urine 04/25/2024 Clear  Clear Final    Glucose Urine 04/25/2024 Negative  Negative mg/dL Final    Bilirubin Urine 04/25/2024 Negative  Negative Final    Ketones Urine 04/25/2024 Negative  Negative mg/dL Final    Specific Gravity Urine 04/25/2024 >=1.030  1.003 - 1.035 Final    Blood Urine  04/25/2024 Negative  Negative Final    pH Urine 04/25/2024 6.0  5.0 - 7.0 Final    Protein Albumin Urine 04/25/2024 Negative  Negative mg/dL Final    Urobilinogen Urine 04/25/2024 0.2  0.2, 1.0 E.U./dL Final    Nitrite Urine 04/25/2024 Negative  Negative Final    Leukocyte Esterase Urine 04/25/2024 Negative  Negative Final   Lab on 04/14/2024   Component Date Value Ref Range Status    hCG Quantitative 04/14/2024 82,972 (H)  <5 mIU/mL Final    Adult: 0-5 mIU/mL for healthy non-pregnant person  Neonates: Should be within normal ranges by 2 days after birth     Virtual Visit on 03/28/2024   Component Date Value Ref Range Status    Hepatitis C Antibody (External) 01/23/2024 Nonreactive  Nonreactive Final    ABO (External) 01/23/2024 A   Final    Rh (External) 01/23/2024 POSITIVE   Final    HIV 1&2 Antibody (External) 01/23/2024 Nonreactive  Nonreactive Final    Rubella Antibody IgG (External) 01/23/2024 Nonreactive  Nonreactive Final   My[facility] Medical Advice on 03/28/2024   Component Date Value Ref Range Status    TSH (External) 01/23/2024 1.67  0.270 - 4.2 mU/L Final    Rubella Antibody IgG (External) 01/23/2024 Reactive  Nonreactive Final    80.02    Hemoglobin A1C (External) 01/23/2024 5.4  4 - 5.7 % Final    HIV 1/2 Agn Khushi 4th Gen With Reflex 01/23/2024 Non-reactive   Final    ABO (External) 01/23/2024 A   Final    Rh (External) 01/23/2024 POSITIVE   Final    Vitamin D 25-OH Total 01/23/2024 23 (A)  29 - 30 Final    Varicella Zoster Antibody IgG (Ins* 01/23/2024 2,304.00   Final    Hepatitis C Antibody (External) 01/23/2024 Nonreactive  Nonreactive Final    Hepatitis B Surface Antigen (Exter* 01/23/2024 Nonreactive  Nonreactive Final    RBC Antibody Screen 01/23/2024 None detected   Final    Prolactin 01/23/2024 18.19  4.79 - 23.3 mcg/L Final   Lab on 03/21/2024   Component Date Value Ref Range Status    Influenza A PCR 03/21/2024 Negative  Negative Final    Influenza B PCR 03/21/2024 Negative  Negative Final    RSV  PCR 03/21/2024 Negative  Negative Final    SARS CoV2 PCR 03/21/2024 Negative  Negative Final    NEGATIVE: SARS-CoV-2 (COVID-19) RNA not detected, presumed negative.   Lab on 12/21/2023   Component Date Value Ref Range Status    Estradiol 12/21/2023 14  pg/mL Final    Healthy Men:   11.3-43.2 pg/mL    Healthy Postmenopausal Women:  Postmenopause: <5-138 pg/mL    Healthy Pregnant Women:  1st trimester: 154-3243 pg/mL  2nd trimester: 1561-42408 pg/mL  3rd trimester: 8525->48388 pg/mL    Healthy Women Cycle Phase:  Follicular: 30.9-90.4 pg/mL  Ovulation: 60.4-533 pg/mL  Luteal: 60.4-232 pg/mL    Healthy Women Cycle Sub-Phase:  Early Follicular: 20.5-62.8 pg/mL  Intermediate Follicular: 26-79.8 pg/mL  Late Follicular: 49.5-233 pg/mL  Ovulation: 60.4-602 pg/mL  Early Luteal: 51.1-179 pg/mL  Intermediate Luteal: 66.5-305 pg/mL  Late Luteal: 30.2-222 pg/mL    Luteinizing Hormone 12/21/2023 5.2  mIU/mL Final    FEMALE:  Age  0 - 6 mo:  <0.1-8.2 mIU/mL  6 mo - 11 years: <0.1-1.3 mIU/mL   11 - 14 years: <0.1-10 mIU/mL   14 - 19 years: 0.4-25 mIU/mL     19 years and older:   Follicular Phase: 2.4-12.6 mIU/mL  Ovulation Phase: 14.0-95.6 mIU/mL  Luteal Phase: 1.0-11.4  mIU/mL  Postmenopausal: 7.7-58.5 mIU/mL      FSH 12/21/2023 8.6  mIU/mL Final    19 years and older:   Follicular phase: 3.5-12.5 mIU/mL   Ovulation phase: 4.7-21.5 mIU/mL   Luteal phase: 1.7-7.7 mIU/mL   Postmenopause: 25.8-134.8 mIU/mL      Office Visit on 12/12/2023   Component Date Value Ref Range Status    Cholesterol 12/12/2023 249 (H)  <200 mg/dL Final    Triglycerides 12/12/2023 111  <150 mg/dL Final    Direct Measure HDL 12/12/2023 47 (L)  >=50 mg/dL Final    LDL Cholesterol Calculated 12/12/2023 180 (H)  <=100 mg/dL Final    Non HDL Cholesterol 12/12/2023 202 (H)  <130 mg/dL Final    Patient Fasting > 8hrs? 12/12/2023 Yes   Final    Lead Venous Blood 12/12/2023 <2.0  <=4.9 ug/dL Final    Comment: INTERPRETIVE INFORMATION: Lead, Blood (Venous)    Analysis  "performed by Inductively Coupled Plasma-Mass   Spectrometry (ICP-MS).    Elevated results may be due to skin or collection-related   contamination, including the use of a noncertified   lead-free tube. If contamination concerns exist due to   elevated levels of blood lead, confirmation with a second   specimen collected in a certified lead-free tube is   recommended.    Information sources for blood lead reference intervals and   interpretive comments include the CDC's \"Childhood Lead   Poisoning Prevention: Recommended Actions Based on Blood   Lead Level\" and the \"Adult Blood Lead Epidemiology and   Surveillance: Reference Blood Lead Levels (BLLs) for Adults   in the [place].\" Thresholds and time intervals for retesting,   medical evaluation, and response vary by state and   regulatory body. Contact your State Department of Health   and/or applicable regulatory agency for specific guidance   on medical management                            recommendations.    This test was developed and its performance characteristics   determined by [employer]. It has not been cleared or   approved by the U.S. Food and Drug Administration. This   test was performed in a CLIA-certified laboratory and is   intended for clinical purposes.         Group          Concentration   Comment    Children       3.5-19.9 ug/dL  Children under the age of 6                                 years are the most vulnerable                                 to the harmful effects of                                  lead exposure. Environmental                                  investigation and exposure                                  history to identify potential                                 sources of lead. Biological                                  and nutritional monitoring                                 are recommended. Follow-up                                  blood lead monitoring is                                  recommended.          "                                                  20-44.9 ug/dL   Lead hazard reduction and                                  prompt medical evaluation are                                 recommended. Contact a                                  Pediatric Environmental                                  Health Specialty Unit or                                  poison control center for                                  guidance.                   Greater than    Critical. Immediate medical                  44.9 ug/dL      evaluation, including                                  detailed neurological exam is                                 recommended. Consider                                  chelation therapy when                                 symptoms of lead toxicity   are                                  present. Contact a Pediatric                                  Environmental Health                                  Specialty Unit or poison                                  control center for                                                             assistance.    Adult          5-19.9 ug/dL    Medical removal is                                  recommended for pregnant                                  women or those who are trying                                 or may become pregnant.                                  Adverse health effects are                                  possible. Reduced lead                                  exposure and increased blood                                  lead monitoring are                                  recommended.                    20-69.9 ug/dL   Adverse health effects are                                  indicated. Medical removal                                  from lead exposure is                                  required by OSHA if blood                                  lead level exceeds 50 ug/dL.                                 Prompt medical evaluation is                                  recommended.                    Greater than    Critical. Immediate medical                                             69.9 ug/dL      evaluation is recommended.                                  Consider chelation therapy                                 when symptoms of lead                                  toxicity are present.  Performed By: [facility]  [address] [place] [id number]  : Teodoro Medina MD, PhD  CLIA Number: [id number]    Prolactin 12/12/2023 21  5 - 23 ng/mL Final    Anti-Mullerian Hormone 12/12/2023 0.168  0.150 - 7.500 ng/mL Final   Lab on 09/01/2023   Component Date Value Ref Range Status    Campylobacter species 09/01/2023 Negative  Negative Final    Salmonella species 09/01/2023 Negative  Negative Final    Vibrio species 09/01/2023 Negative  Negative Final    Vibrio cholerae 09/01/2023 Negative  Negative Final    Yersinia enterocolitica 09/01/2023 Negative  Negative Final    Enteropathogenic E. coli (EPEC) 09/01/2023 Negative  Negative, NA Final    Shiga-like toxin-producing E. coli* 09/01/2023 Negative  Negative Final    Shigella/Enteroinvasive E. coli (E* 09/01/2023 Negative  Negative Final    Cryptosporidium species 09/01/2023 Negative  Negative Final    Giardia lamblia 09/01/2023 Negative  Negative Final    Norovirus Gl/Gll 09/01/2023 Negative  Negative Final    Rotavirus A 09/01/2023 Negative  Negative Final    Plesiomonas shigelloides 09/01/2023 Negative  Negative Final    Enteroaggregative E. coli (EAEC) 09/01/2023 Negative  Negative Final    Enterotoxigenic E. coli (ETEC) 09/01/2023 Negative  Negative Final    E. coli O157 09/01/2023 NA  Negative, NA Final    Cyclospora cayetanensis 09/01/2023 Negative  Negative Final    Entamoeba histolytica 09/01/2023 Negative  Negative Final    Adenovirus F40/41 09/01/2023 Negative  Negative Final    Astrovirus 09/01/2023 Negative  Negative Final    Sapovirus 09/01/2023 Negative   Negative Final   Office Visit on 06/05/2023   Component Date Value Ref Range Status    TSH 06/05/2023 1.42  0.30 - 4.20 uIU/mL Final    Ferritin 06/05/2023 41  6 - 175 ng/mL Final    Sodium 06/05/2023 138  136 - 145 mmol/L Final    Potassium 06/05/2023 4.6  3.4 - 5.3 mmol/L Final    Chloride 06/05/2023 103  98 - 107 mmol/L Final    Carbon Dioxide (CO2) 06/05/2023 24  22 - 29 mmol/L Final    Anion Gap 06/05/2023 11  7 - 15 mmol/L Final    Urea Nitrogen 06/05/2023 10.1  6.0 - 20.0 mg/dL Final    Creatinine 06/05/2023 0.88  0.51 - 0.95 mg/dL Final    Calcium 06/05/2023 9.3  8.6 - 10.0 mg/dL Final    Glucose 06/05/2023 91  70 - 99 mg/dL Final    Alkaline Phosphatase 06/05/2023 37  35 - 104 U/L Final    AST 06/05/2023 21  10 - 35 U/L Final    ALT 06/05/2023 11  10 - 35 U/L Final    Protein Total 06/05/2023 7.1  6.4 - 8.3 g/dL Final    Albumin 06/05/2023 4.3  3.5 - 5.2 g/dL Final    Bilirubin Total 06/05/2023 0.3  <=1.2 mg/dL Final    GFR Estimate 06/05/2023 86  >60 mL/min/1.73m2 Final    eGFR calculated using 2021 CKD-EPI equation.    Magnesium 06/05/2023 2.0  1.7 - 2.3 mg/dL Final    WBC Count 06/05/2023 7.9  4.0 - 11.0 10e3/uL Final    RBC Count 06/05/2023 4.63  3.80 - 5.20 10e6/uL Final    Hemoglobin 06/05/2023 13.1  11.7 - 15.7 g/dL Final    Hematocrit 06/05/2023 41.4  35.0 - 47.0 % Final    MCV 06/05/2023 89  78 - 100 fL Final    MCH 06/05/2023 28.3  26.5 - 33.0 pg Final    MCHC 06/05/2023 31.6  31.5 - 36.5 g/dL Final    RDW 06/05/2023 14.6  10.0 - 15.0 % Final    Platelet Count 06/05/2023 208  150 - 450 10e3/uL Final    % Neutrophils 06/05/2023 70  % Final    % Lymphocytes 06/05/2023 19  % Final    % Monocytes 06/05/2023 7  % Final    % Eosinophils 06/05/2023 3  % Final    % Basophils 06/05/2023 1  % Final    % Immature Granulocytes 06/05/2023 0  % Final    NRBCs per 100 WBC 06/05/2023 0  <1 /100 Final    Absolute Neutrophils 06/05/2023 5.5  1.6 - 8.3 10e3/uL Final    Absolute Lymphocytes 06/05/2023 1.5  0.8 - 5.3  "10e3/uL Final    Absolute Monocytes 06/05/2023 0.6  0.0 - 1.3 10e3/uL Final    Absolute Eosinophils 06/05/2023 0.3  0.0 - 0.7 10e3/uL Final    Absolute Basophils 06/05/2023 0.1  0.0 - 0.2 10e3/uL Final    Absolute Immature Granulocytes 06/05/2023 0.0  <=0.4 10e3/uL Final    Absolute NRBCs 06/05/2023 0.0  10e3/uL Final     No EKG on file.       Medical Review of Systems      Pertinent positives noted in HPI and below:   Review of Systems   All other systems reviewed and are negative.       Contraception: None Patient's last menstrual period was 02/10/2024.  Pregnancy status: Pregnant: 6/7 weeks gestation    Mental Status Exam      Appearance: awake, alert, adequately groomed, appeared stated age and no apparent distress  Attitude:  cooperative   Eye Contact:  good  Gait and Station: normal, no gross abnormalities noted by observation  Psychomotor Behavior:  no evidence of tardive dyskinesia, dystonia, or tics  Oriented to:  person, place, time, and situation  Attention Span and Concentration:  normal  Speech:  clear, coherent, regular rate, rhythm, and volume  Language: intact  Mood: \"tired\"  Affect:  appropriate and in normal range  Associations:  no loose associations  Thought Process:  logical, linear and goal oriented  Thought Content:  no evidence of suicidal ideation or homicidal ideation, no evidence of psychotic thought, no auditory hallucinations present and no visual hallucinations present  Recent and Remote Memory:  Intact to interview. Not formally assessed. No amnesia.  Fund of Knowledge: appropriate  Insight: Partial to full  Judgment:  intact, adequate for safety  Impulse Control:  intact            Risk Assessment       Suicide assessment  Acute: Low  Chronic:Low  Imminent: Low     Risk factors  History of suicide attempts: No  History of self-injurious behavior: No  Beau. Axis I psychiatric diagnoses: Yes  Substance use disorder: No  Symptoms:  feeling inadequate  Family history of completed suicide " or attempted suicide: No  Accessibility to firearms: No  Interpersonal factors: Pregnancy     Protective factors  Ability to cope with the stress: Yes  Family responsibility and supportive:Yes  Positive therapeutic relationships: Yes  Social support:Yes  Restoration beliefs:  No  Connectedness with mental health providers: Yes     Homicidal Risk  Acute: Low  Chronic: Low  Imminent: Low    Assessment     Coty Doyle has a past psychiatry history of depression and anxiety who has been referred for medication management from primary care provider.  No previous psychiatric inpatient hospitalizations.  Denies history of suicidal ideation.  Denies history of suicide attempts.  Denies history of self harming behaviors.  No overt concerns regarding chemical health history.  Did experience difficult childhood.     Coty Doyle reports she has stopped breastfeeding,  has taken middle of the night shift with baby which has improved sleep quality thus improving mood.  We will continue current regimen.  We did discuss OB/GYN's addition of Wellbutrin  mg which she did not start.  We did discuss that with sleep deprivation and heightened anxiety I was hesitant for a few reasons: 1) Wellbutrin could worsen anxiety and irritability and 2) there is room for further optimization on sertraline which she has had benefit on previously.  We will consider further optimization of sertraline if needed.  No imminent safety concerns identified today.    Pharmacologic:   11/23/24: + sertraline 100mg     -Continue sertraline 100 mg by mouth every bedtime        Psychosocial: Would benefit from individual therapy with focus of Dialectical Behavior Therapy (DBT). DBT would be helpful in addressing emotional regulation, distress tolerance, mindfulness, and interpersonal effectiveness.   - Has individual therapist. Weekly.          Diagnosis       Generalized anxiety disorder  Major depressive disorder, recurrent,  mild     Plan         1) Medications:   -Sertraline 100 mg by mouth every bedtime              MNPMP: I have queried the [place] and/or [place] Prescription Monitoring Program for this patient for the preceding 12 months, or reviewed the report provided by my proxy delegate. I have not identified any concerns.  2) Risk vs benefits of medications reviewed: Yes  3) Life style modifications: sleep hygiene, exercise, healthy diet  4) Medical concerns:   -Breastfeeding.   -Working with endocrinology  5) Others   -Continue individual therapy  6) Refrain from drinking alcohol and/or use of drugs.   7) Please secure all prescription and OTC medications, sharps, and caustic substances. Please remove all firearms and ammunition.  8) Review outside records, get JUSTINO's, coordinate with outside providers  - Obtain verbal JUSTINO for    9) In case of emergency call 911 or go to the nearest ER, this includes patient voicing thought of harming self or others as well as additional safety concerns   10) Follow-up: 6-8 weeks, or sooner if needed.    Administrative Billing:   Supportive therapy was provided, focusing on reflective listening and solution focused problem solving.    Total time preparing to see this patient, face-to-face time, documenting in the EHR, and coordinating care time on the same calendar date:27minutes    The longitudinal plan of care for the diagnosis(es)/condition(s) as documented were addressed during this visit. Due to the added complexity in care, I will continue to support Coty in the subsequent management and with ongoing continuity of care.      Signed:   PALOMA Cheek CNP on 4/7/2025 at 8:31 AM     Disclaimer: This note consists of symbols derived from keyboarding, dictation and/or voice recognition software. As a result, there may be errors in the script that have gone undetected. Please consider this when interpreting information found in this chart.       Cyclophosphamide Counseling:  I discussed with the patient the risks of cyclophosphamide including but not limited to hair loss, hormonal abnormalities, decreased fertility, abdominal pain, diarrhea, nausea and vomiting, bone marrow suppression and infection. The patient understands that monitoring is required while taking this medication.

## 2025-04-11 PROBLEM — M54.50 CHRONIC MIDLINE LOW BACK PAIN WITHOUT SCIATICA: Status: RESOLVED | Noted: 2024-11-25 | Resolved: 2025-04-11

## 2025-04-11 PROBLEM — O09.519 SUPERVISION OF PRIMIGRAVIDA OF ADVANCED MATERNAL AGE, ANTEPARTUM: Status: RESOLVED | Noted: 2024-04-27 | Resolved: 2025-04-11

## 2025-04-11 PROBLEM — Z36.89 ENCOUNTER FOR TRIAGE IN PREGNANT PATIENT: Status: RESOLVED | Noted: 2024-10-28 | Resolved: 2025-04-11

## 2025-04-11 PROBLEM — O26.899 PELVIC PAIN IN PREGNANCY: Status: RESOLVED | Noted: 2024-08-16 | Resolved: 2025-04-11

## 2025-04-11 PROBLEM — F41.1 GAD (GENERALIZED ANXIETY DISORDER): Status: ACTIVE | Noted: 2021-12-31

## 2025-04-11 PROBLEM — F33.0 MAJOR DEPRESSIVE DISORDER, RECURRENT EPISODE, MILD: Status: ACTIVE | Noted: 2025-04-11

## 2025-04-11 PROBLEM — R73.03 PREDIABETES: Status: ACTIVE | Noted: 2025-04-11

## 2025-04-11 PROBLEM — G89.29 CHRONIC MIDLINE LOW BACK PAIN WITHOUT SCIATICA: Status: RESOLVED | Noted: 2024-11-25 | Resolved: 2025-04-11

## 2025-04-11 PROBLEM — L30.9 ECZEMA, UNSPECIFIED TYPE: Status: RESOLVED | Noted: 2021-12-31 | Resolved: 2025-04-11

## 2025-04-11 PROBLEM — D64.9 ANEMIA: Status: RESOLVED | Noted: 2024-08-06 | Resolved: 2025-04-11

## 2025-04-11 PROBLEM — R06.83 SNORING: Status: RESOLVED | Noted: 2022-01-01 | Resolved: 2025-04-11

## 2025-04-11 PROBLEM — M99.05 SOMATIC DYSFUNCTION OF PELVIC REGION: Status: RESOLVED | Noted: 2024-11-25 | Resolved: 2025-04-11

## 2025-04-11 PROBLEM — R10.2 PELVIC PAIN IN PREGNANCY: Status: RESOLVED | Noted: 2024-08-16 | Resolved: 2025-04-11

## 2025-04-11 PROBLEM — M25.511 SHOULDER PAIN, RIGHT: Status: RESOLVED | Noted: 2022-09-09 | Resolved: 2025-04-11

## 2025-04-11 PROBLEM — G47.9 SLEEP DISORDER: Status: RESOLVED | Noted: 2022-09-23 | Resolved: 2025-04-11

## 2025-04-11 PROBLEM — O42.90 PROM (PREMATURE RUPTURE OF MEMBRANES): Status: RESOLVED | Noted: 2024-10-28 | Resolved: 2025-04-11

## 2025-04-14 DIAGNOSIS — J45.20 MILD INTERMITTENT ASTHMA WITHOUT COMPLICATION: ICD-10-CM

## 2025-04-14 RX ORDER — BUDESONIDE AND FORMOTEROL FUMARATE DIHYDRATE 160; 4.5 UG/1; UG/1
AEROSOL RESPIRATORY (INHALATION)
Qty: 10.2 G | Refills: 0 | Status: SHIPPED | OUTPATIENT
Start: 2025-04-14

## 2025-04-14 NOTE — TELEPHONE ENCOUNTER
"DOD/covering provider --     This was sent generic already but the note to pharmacy stated. \"Note: MA prefers brand Symbicort\". Are we able to re-send this without this note?    Maddy Sierra RN  [facility]    "

## 2025-04-14 NOTE — TELEPHONE ENCOUNTER
Paulette Tucker calling from [facility] called stating that the patient's insurance does not cover Symbicort inhaler. However they do cover the generic brand of Budesonide Formoterol inhaler and would need provider to order this instead for the patient as it is time sensitive. Writer informed that this will be sent to provider and care team to review.       [phone]  Case: [id number]

## 2025-04-18 ENCOUNTER — MYC MEDICAL ADVICE (OUTPATIENT)
Dept: FAMILY MEDICINE | Facility: CLINIC | Age: 40
End: 2025-04-18
Payer: COMMERCIAL

## 2025-04-28 ENCOUNTER — MYC MEDICAL ADVICE (OUTPATIENT)
Dept: OBGYN | Facility: CLINIC | Age: 40
End: 2025-04-28
Payer: COMMERCIAL

## 2025-05-19 ENCOUNTER — VIRTUAL VISIT (OUTPATIENT)
Dept: PSYCHIATRY | Facility: CLINIC | Age: 40
End: 2025-05-19
Payer: COMMERCIAL

## 2025-05-19 VITALS — WEIGHT: 260 LBS | BODY MASS INDEX: 36.26 KG/M2

## 2025-05-19 DIAGNOSIS — F33.41 MAJOR DEPRESSIVE DISORDER, RECURRENT EPISODE, IN PARTIAL REMISSION: ICD-10-CM

## 2025-05-19 DIAGNOSIS — F41.1 GAD (GENERALIZED ANXIETY DISORDER): ICD-10-CM

## 2025-05-19 RX ORDER — SERTRALINE HYDROCHLORIDE 100 MG/1
100 TABLET, FILM COATED ORAL DAILY
Qty: 90 TABLET | Refills: 1 | Status: SHIPPED | OUTPATIENT
Start: 2025-05-19

## 2025-05-19 ASSESSMENT — PATIENT HEALTH QUESTIONNAIRE - PHQ9
10. IF YOU CHECKED OFF ANY PROBLEMS, HOW DIFFICULT HAVE THESE PROBLEMS MADE IT FOR YOU TO DO YOUR WORK, TAKE CARE OF THINGS AT HOME, OR GET ALONG WITH OTHER PEOPLE: SOMEWHAT DIFFICULT
SUM OF ALL RESPONSES TO PHQ QUESTIONS 1-9: 7
SUM OF ALL RESPONSES TO PHQ QUESTIONS 1-9: 7

## 2025-05-19 ASSESSMENT — PAIN SCALES - GENERAL: PAINLEVEL_OUTOF10: NO PAIN (0)

## 2025-05-19 NOTE — NURSING NOTE
Is the patient currently in the state of [place]? YES    Current patient location: [address] [place] [address]    Visit mode:Video    If the visit is dropped, the patient can be reconnected by: VIDEO VISIT: Text to cell phone:   Telephone Information:   Mobile [phone]       Will anyone else be joining the visit? No  (If patient encounters technical issues they should call [phone])    Are changes needed to the allergy or medication list? No    Are refills needed on medications prescribed by this physician? Discuss with Provider    Rooming Documentation: Questionnaire(s) completed.    Reason for visit: RECHALBERTINA Marquez

## 2025-05-19 NOTE — PROGRESS NOTES
"Virtual Visit Details     Type of service:  Video Visit     Originating Location (pt. Location): Home    Distant Location (provider location):  Off-site  Platform used for Video Visit: [facility]       OUTPATIENT PSYCHIATRIC FOLLOW-UP      2025     Provider: PALOMA Cheek CNP      Appointment Start Time: 258  Appointment End Time: 314    Name: Coty Doyle   : 1985                    Preferred Name: Coty      Screening Tools           2025     1:50 PM 2025     7:54 AM 2025     8:18 AM   PHQ   PHQ-9 Total Score 7  4  14   Q9: Thoughts of better off dead/self-harm past 2 weeks Not at all Not at all Not at all       Patient-reported         2025     7:54 AM 2025     8:58 AM 2025     2:56 AM   BALBINA-7 SCORE   Total Score 4 (minimal anxiety) 19 (severe anxiety) 19 (severe anxiety)   Total Score 4  19  19        Patient-reported     PROMIS 10-Global Health (only subscores and total score):       3/25/2024    11:24 AM 2024     8:42 AM 10/27/2024    10:00 AM 2024     5:47 PM 2025    10:58 AM   PROMIS-10 Scores Only   Global Mental Health Score 13 12 16  10  10    Global Physical Health Score 16 12 12  10  12    PROMIS TOTAL - SUBSCORES 29 24 28  20  22        Patient-reported          History of Present Illness      Patient attended the session alone.     Interim History:  I last saw Coty Doyle for outpatient psychiatry follow-up on 25. During that appointment, we continued plan    Current stressors include: finances, work, relationships    Coping mechanisms and supports include: Family and Friends    Side effects: Denies    Medication adherence: Reports good med adherence.    Psychiatric Review of Systems      Coty Doyle reports mood has been: \"good\"    - Work going well. Busy. 3 days a week  - Processing friendship  - Son 6.5 months.     Depression has been:   - Fatigue. Continues  - Mood evened    Anxiety has been:   - Less " anxious     Sleep has been:   - 530 A, waking  - 10 P, sleep    Louann sxs: Denies    Psychosis sxs: Denies    ADHD sxs: Denies    PTSD sxs: Denies    SI/SIB: Denies SI/SIB/HI      Medications Prior to Appointment       Current Outpatient Medications   Medication Sig Dispense Refill    budesonide-formoterol (SYMBICORT/BREYNA) 160-4.5 MCG/ACT Inhaler Inhale 1 puff as needed. May use up to 12 puffs per day. 10.2 g 0    cyanocobalamin (VITAMIN B-12) 500 MCG SUBL sublingual tablet       Elemental iron 65 mg Vitamin C 125 mg (VITRON C)  MG TABS tablet Take 1 tablet by mouth every other day.      estradiol (ESTRACE) 0.1 MG/GM vaginal cream Place 2 g vaginally three times a week. 42.5 g 5    MAGNESIUM GLYCINATE PO Take by mouth. 1/3rd of a 400 mg dose nightly      Omega-3 Fatty Acids (FISH OIL) 1200 MG capsule Take 1,200 mg by mouth daily.      Prenatal Vit-DSS-Fe Cbn-FA (PRENATAL AD PO)       sertraline (ZOLOFT) 100 MG tablet Take 1 tablet (100 mg) by mouth daily. 90 tablet 0    Vitamin D, Cholecalciferol, 25 MCG (1000 UT) CAPS             Previous medication trials include but not limited to:  - sertraline                  Medication Compliance: Yes                 Pharmacogenomic Testing Completed: No      Medical History      History of head injuries: No  History of seizures: No  History of cardiac events: No  History of Tardive Dyskinesia: No      Past Medical History:   Diagnosis Date    Anemia 08/06/2024    BMI 28.0-28.9,adult 01/01/2022    CARDIOVASCULAR SCREENING; LDL GOAL LESS THAN 160 04/17/2012    Chronic midline low back pain without sciatica 11/25/2024    Contraceptive management 04/18/2012    Eczema, unspecified type 12/31/2021    Encounter for triage in pregnant patient 10/28/2024    History of COVID-19     Jaw pain 12/31/2021    Nonintractable episodic headache, unspecified headache type 01/01/2022    Pelvic pain in pregnancy 08/16/2024    Plantar warts 07/06/2012    PROM (premature rupture of  membranes) 10/28/2024    Shoulder pain, right 2022    Sleep disorder 2022    Snoring 2022    Somatic dysfunction of pelvic region 2024    Supervision of primigravida of advanced maternal age, antepartum 2024    Dates by LMP c/w 10 wk u/s, spontaneous pregnancy     AMA--first trimester--anterior placenta, normal  NIPT--  AFP--  Level 2--      Uncomplicated asthma         Surgery:   Past Surgical History:   Procedure Laterality Date    MANDIBLE SURGERY  2004    PE TUBES      as child.    REPAIR LACERATION PERINEAL      during  10/2024     Primary Care Provider: PEDRO MIRELES MD        Social History      Current Living situation:  [place], [place] with Spouse/Partner and son (10/24)    Current use of drugs or alcohol: Denies     Tobacco use: No    Employment: Yes: [employer] employed full time    Relationship Status:      Vitals      Not obtained d/t virtual visit.     Wt 117.9 kg (260 lb)   LMP 2025 (Exact Date)   BMI 36.26 kg/m      Labs        Most recent laboratory results reviewed and pertinent results include:   Lab on 2024   Component Date Value Ref Range Status    WBC Count 2024 12.6 (H)  4.0 - 11.0 10e3/uL Final    RBC Count 2024 4.21  3.80 - 5.20 10e6/uL Final    Hemoglobin 2024 12.3  11.7 - 15.7 g/dL Final    Hematocrit 2024 36.4  35.0 - 47.0 % Final    MCV 2024 87  78 - 100 fL Final    MCH 2024 29.2  26.5 - 33.0 pg Final    MCHC 2024 33.8  31.5 - 36.5 g/dL Final    RDW 2024 14.2  10.0 - 15.0 % Final    Platelet Count 2024 216  150 - 450 10e3/uL Final    Treponema Antibody Total 2024 Nonreactive  Nonreactive Final    See Scanned Result 2024 MYRIAD NON-INVASIVE PRENATAL SCREENING PREQUEL-Scanned   Final   Prenatal Office Visit on 2024   Component Date Value Ref Range Status    Culture 2024 <10,000 CFU/mL Mixture of urogenital amalia   Final    Color Urine 2024 Yellow   Colorless, Straw, Light Yellow, Yellow Final    Appearance Urine 04/25/2024 Clear  Clear Final    Glucose Urine 04/25/2024 Negative  Negative mg/dL Final    Bilirubin Urine 04/25/2024 Negative  Negative Final    Ketones Urine 04/25/2024 Negative  Negative mg/dL Final    Specific Gravity Urine 04/25/2024 >=1.030  1.003 - 1.035 Final    Blood Urine 04/25/2024 Negative  Negative Final    pH Urine 04/25/2024 6.0  5.0 - 7.0 Final    Protein Albumin Urine 04/25/2024 Negative  Negative mg/dL Final    Urobilinogen Urine 04/25/2024 0.2  0.2, 1.0 E.U./dL Final    Nitrite Urine 04/25/2024 Negative  Negative Final    Leukocyte Esterase Urine 04/25/2024 Negative  Negative Final   Lab on 04/14/2024   Component Date Value Ref Range Status    hCG Quantitative 04/14/2024 82,972 (H)  <5 mIU/mL Final    Adult: 0-5 mIU/mL for healthy non-pregnant person  Neonates: Should be within normal ranges by 2 days after birth     Virtual Visit on 03/28/2024   Component Date Value Ref Range Status    Hepatitis C Antibody (External) 01/23/2024 Nonreactive  Nonreactive Final    ABO (External) 01/23/2024 A   Final    Rh (External) 01/23/2024 POSITIVE   Final    HIV 1&2 Antibody (External) 01/23/2024 Nonreactive  Nonreactive Final    Rubella Antibody IgG (External) 01/23/2024 Nonreactive  Nonreactive Final   [facility] Medical Advice on 03/28/2024   Component Date Value Ref Range Status    TSH (External) 01/23/2024 1.67  0.270 - 4.2 mU/L Final    Rubella Antibody IgG (External) 01/23/2024 Reactive  Nonreactive Final    80.02    Hemoglobin A1C (External) 01/23/2024 5.4  4 - 5.7 % Final    HIV 1/2 Agn Khushi 4th Gen With Reflex 01/23/2024 Non-reactive   Final    ABO (External) 01/23/2024 A   Final    Rh (External) 01/23/2024 POSITIVE   Final    Vitamin D 25-OH Total 01/23/2024 23 (A)  29 - 30 Final    Varicella Zoster Antibody IgG (Ins* 01/23/2024 2,304.00   Final    Hepatitis C Antibody (External) 01/23/2024 Nonreactive  Nonreactive Final    Hepatitis B Surface  Antigen (Exter* 01/23/2024 Nonreactive  Nonreactive Final    RBC Antibody Screen 01/23/2024 None detected   Final    Prolactin 01/23/2024 18.19  4.79 - 23.3 mcg/L Final   Lab on 03/21/2024   Component Date Value Ref Range Status    Influenza A PCR 03/21/2024 Negative  Negative Final    Influenza B PCR 03/21/2024 Negative  Negative Final    RSV PCR 03/21/2024 Negative  Negative Final    SARS CoV2 PCR 03/21/2024 Negative  Negative Final    NEGATIVE: SARS-CoV-2 (COVID-19) RNA not detected, presumed negative.   Lab on 12/21/2023   Component Date Value Ref Range Status    Estradiol 12/21/2023 14  pg/mL Final    Healthy Men:   11.3-43.2 pg/mL    Healthy Postmenopausal Women:  Postmenopause: <5-138 pg/mL    Healthy Pregnant Women:  1st trimester: 154-3243 pg/mL  2nd trimester: 1561-92389 pg/mL  3rd trimester: 8525->81126 pg/mL    Healthy Women Cycle Phase:  Follicular: 30.9-90.4 pg/mL  Ovulation: 60.4-533 pg/mL  Luteal: 60.4-232 pg/mL    Healthy Women Cycle Sub-Phase:  Early Follicular: 20.5-62.8 pg/mL  Intermediate Follicular: 26-79.8 pg/mL  Late Follicular: 49.5-233 pg/mL  Ovulation: 60.4-602 pg/mL  Early Luteal: 51.1-179 pg/mL  Intermediate Luteal: 66.5-305 pg/mL  Late Luteal: 30.2-222 pg/mL    Luteinizing Hormone 12/21/2023 5.2  mIU/mL Final    FEMALE:  Age  0 - 6 mo:  <0.1-8.2 mIU/mL  6 mo - 11 years: <0.1-1.3 mIU/mL   11 - 14 years: <0.1-10 mIU/mL   14 - 19 years: 0.4-25 mIU/mL     19 years and older:   Follicular Phase: 2.4-12.6 mIU/mL  Ovulation Phase: 14.0-95.6 mIU/mL  Luteal Phase: 1.0-11.4  mIU/mL  Postmenopausal: 7.7-58.5 mIU/mL      FSH 12/21/2023 8.6  mIU/mL Final    19 years and older:   Follicular phase: 3.5-12.5 mIU/mL   Ovulation phase: 4.7-21.5 mIU/mL   Luteal phase: 1.7-7.7 mIU/mL   Postmenopause: 25.8-134.8 mIU/mL      Office Visit on 12/12/2023   Component Date Value Ref Range Status    Cholesterol 12/12/2023 249 (H)  <200 mg/dL Final    Triglycerides 12/12/2023 111  <150 mg/dL Final    Direct Measure  "HDL 12/12/2023 47 (L)  >=50 mg/dL Final    LDL Cholesterol Calculated 12/12/2023 180 (H)  <=100 mg/dL Final    Non HDL Cholesterol 12/12/2023 202 (H)  <130 mg/dL Final    Patient Fasting > 8hrs? 12/12/2023 Yes   Final    Lead Venous Blood 12/12/2023 <2.0  <=4.9 ug/dL Final    Comment: INTERPRETIVE INFORMATION: Lead, Blood (Venous)    Analysis performed by Inductively Coupled Plasma-Mass   Spectrometry (ICP-MS).    Elevated results may be due to skin or collection-related   contamination, including the use of a noncertified   lead-free tube. If contamination concerns exist due to   elevated levels of blood lead, confirmation with a second   specimen collected in a certified lead-free tube is   recommended.    Information sources for blood lead reference intervals and   interpretive comments include the CDC's \"Childhood Lead   Poisoning Prevention: Recommended Actions Based on Blood   Lead Level\" and the \"Adult Blood Lead Epidemiology and   Surveillance: Reference Blood Lead Levels (BLLs) for Adults   in the [place].\" Thresholds and time intervals for retesting,   medical evaluation, and response vary by state and   regulatory body. Contact your State Department of Health   and/or applicable regulatory agency for specific guidance   on medical management                            recommendations.    This test was developed and its performance characteristics   determined by [facility]. It has not been cleared or   approved by the U.S. Food and Drug Administration. This   test was performed in a CLIA-certified laboratory and is   intended for clinical purposes.         Group          Concentration   Comment    Children       3.5-19.9 ug/dL  Children under the age of 6                                 years are the most vulnerable                                 to the harmful effects of                                  lead exposure. Environmental                                  investigation and exposure           "                        history to identify potential                                 sources of lead. Biological                                  and nutritional monitoring                                 are recommended. Follow-up                                  blood lead monitoring is                                  recommended.                                                           20-44.9 ug/dL   Lead hazard reduction and                                  prompt medical evaluation are                                 recommended. Contact a                                  Pediatric Environmental                                  Health Specialty Unit or                                  poison control center for                                  guidance.                   Greater than    Critical. Immediate medical                  44.9 ug/dL      evaluation, including                                  detailed neurological exam is                                 recommended. Consider                                  chelation therapy when                                 symptoms of lead toxicity   are                                  present. Contact a Pediatric                                  Environmental Health                                  Specialty Unit or poison                                  control center for                                                             assistance.    Adult          5-19.9 ug/dL    Medical removal is                                  recommended for pregnant                                  women or those who are trying                                 or may become pregnant.                                  Adverse health effects are                                  possible. Reduced lead                                  exposure and increased blood                                  lead monitoring are                                  recommended.                     20-69.9 ug/dL   Adverse health effects are                                  indicated. Medical removal                                  from lead exposure is                                  required by OSHA if blood                                  lead level exceeds 50 ug/dL.                                 Prompt medical evaluation is                                 recommended.                    Greater than    Critical. Immediate medical                                             69.9 ug/dL      evaluation is recommended.                                  Consider chelation therapy                                 when symptoms of lead                                  toxicity are present.  Performed By: [facility]  [address] [place] [address]  : Teodoro Medina MD, PhD  CLIA Number: [id number]    Prolactin 12/12/2023 21  5 - 23 ng/mL Final    Anti-Mullerian Hormone 12/12/2023 0.168  0.150 - 7.500 ng/mL Final   Lab on 09/01/2023   Component Date Value Ref Range Status    Campylobacter species 09/01/2023 Negative  Negative Final    Salmonella species 09/01/2023 Negative  Negative Final    Vibrio species 09/01/2023 Negative  Negative Final    Vibrio cholerae 09/01/2023 Negative  Negative Final    Yersinia enterocolitica 09/01/2023 Negative  Negative Final    Enteropathogenic E. coli (EPEC) 09/01/2023 Negative  Negative, NA Final    Shiga-like toxin-producing E. coli* 09/01/2023 Negative  Negative Final    Shigella/Enteroinvasive E. coli (E* 09/01/2023 Negative  Negative Final    Cryptosporidium species 09/01/2023 Negative  Negative Final    Giardia lamblia 09/01/2023 Negative  Negative Final    Norovirus Gl/Gll 09/01/2023 Negative  Negative Final    Rotavirus A 09/01/2023 Negative  Negative Final    Plesiomonas shigelloides 09/01/2023 Negative  Negative Final    Enteroaggregative E. coli (EAEC) 09/01/2023 Negative  Negative Final    Enterotoxigenic E. coli (ETEC)  09/01/2023 Negative  Negative Final    E. coli O157 09/01/2023 NA  Negative, NA Final    Cyclospora cayetanensis 09/01/2023 Negative  Negative Final    Entamoeba histolytica 09/01/2023 Negative  Negative Final    Adenovirus F40/41 09/01/2023 Negative  Negative Final    Astrovirus 09/01/2023 Negative  Negative Final    Sapovirus 09/01/2023 Negative  Negative Final   Office Visit on 06/05/2023   Component Date Value Ref Range Status    TSH 06/05/2023 1.42  0.30 - 4.20 uIU/mL Final    Ferritin 06/05/2023 41  6 - 175 ng/mL Final    Sodium 06/05/2023 138  136 - 145 mmol/L Final    Potassium 06/05/2023 4.6  3.4 - 5.3 mmol/L Final    Chloride 06/05/2023 103  98 - 107 mmol/L Final    Carbon Dioxide (CO2) 06/05/2023 24  22 - 29 mmol/L Final    Anion Gap 06/05/2023 11  7 - 15 mmol/L Final    Urea Nitrogen 06/05/2023 10.1  6.0 - 20.0 mg/dL Final    Creatinine 06/05/2023 0.88  0.51 - 0.95 mg/dL Final    Calcium 06/05/2023 9.3  8.6 - 10.0 mg/dL Final    Glucose 06/05/2023 91  70 - 99 mg/dL Final    Alkaline Phosphatase 06/05/2023 37  35 - 104 U/L Final    AST 06/05/2023 21  10 - 35 U/L Final    ALT 06/05/2023 11  10 - 35 U/L Final    Protein Total 06/05/2023 7.1  6.4 - 8.3 g/dL Final    Albumin 06/05/2023 4.3  3.5 - 5.2 g/dL Final    Bilirubin Total 06/05/2023 0.3  <=1.2 mg/dL Final    GFR Estimate 06/05/2023 86  >60 mL/min/1.73m2 Final    eGFR calculated using 2021 CKD-EPI equation.    Magnesium 06/05/2023 2.0  1.7 - 2.3 mg/dL Final    WBC Count 06/05/2023 7.9  4.0 - 11.0 10e3/uL Final    RBC Count 06/05/2023 4.63  3.80 - 5.20 10e6/uL Final    Hemoglobin 06/05/2023 13.1  11.7 - 15.7 g/dL Final    Hematocrit 06/05/2023 41.4  35.0 - 47.0 % Final    MCV 06/05/2023 89  78 - 100 fL Final    MCH 06/05/2023 28.3  26.5 - 33.0 pg Final    MCHC 06/05/2023 31.6  31.5 - 36.5 g/dL Final    RDW 06/05/2023 14.6  10.0 - 15.0 % Final    Platelet Count 06/05/2023 208  150 - 450 10e3/uL Final    % Neutrophils 06/05/2023 70  % Final    %  "Lymphocytes 06/05/2023 19  % Final    % Monocytes 06/05/2023 7  % Final    % Eosinophils 06/05/2023 3  % Final    % Basophils 06/05/2023 1  % Final    % Immature Granulocytes 06/05/2023 0  % Final    NRBCs per 100 WBC 06/05/2023 0  <1 /100 Final    Absolute Neutrophils 06/05/2023 5.5  1.6 - 8.3 10e3/uL Final    Absolute Lymphocytes 06/05/2023 1.5  0.8 - 5.3 10e3/uL Final    Absolute Monocytes 06/05/2023 0.6  0.0 - 1.3 10e3/uL Final    Absolute Eosinophils 06/05/2023 0.3  0.0 - 0.7 10e3/uL Final    Absolute Basophils 06/05/2023 0.1  0.0 - 0.2 10e3/uL Final    Absolute Immature Granulocytes 06/05/2023 0.0  <=0.4 10e3/uL Final    Absolute NRBCs 06/05/2023 0.0  10e3/uL Final     No EKG on file.       Medical Review of Systems      Pertinent positives noted in HPI and below:   Review of Systems   All other systems reviewed and are negative.       Contraception: None Patient's last menstrual period was 02/10/2024.  Pregnancy status: Pregnant: 6/7 weeks gestation    Mental Status Exam      Appearance: awake, alert, adequately groomed, appeared stated age and no apparent distress  Attitude:  cooperative   Eye Contact:  good  Gait and Station: normal, no gross abnormalities noted by observation  Psychomotor Behavior:  no evidence of tardive dyskinesia, dystonia, or tics  Oriented to:  person, place, time, and situation  Attention Span and Concentration:  normal  Speech:  clear, coherent, regular rate, rhythm, and volume  Language: intact  Mood: \"good\"  Affect:  appropriate and in normal range  Associations:  no loose associations  Thought Process:  logical, linear and goal oriented  Thought Content:  no evidence of suicidal ideation or homicidal ideation, no evidence of psychotic thought, no auditory hallucinations present and no visual hallucinations present  Recent and Remote Memory:  Intact to interview. Not formally assessed. No amnesia.  Fund of Knowledge: appropriate  Insight: Partial to full  Judgment:  intact, " adequate for safety  Impulse Control:  intact            Risk Assessment       Suicide assessment  Acute: Low  Chronic:Low  Imminent: Low     Risk factors  History of suicide attempts: No  History of self-injurious behavior: No  Beau. Axis I psychiatric diagnoses: Yes  Substance use disorder: No  Symptoms:  feeling inadequate  Family history of completed suicide or attempted suicide: No  Accessibility to firearms: No  Interpersonal factors: Pregnancy     Protective factors  Ability to cope with the stress: Yes  Family responsibility and supportive:Yes  Positive therapeutic relationships: Yes  Social support:Yes  Quaker beliefs:  No  Connectedness with mental health providers: Yes     Homicidal Risk  Acute: Low  Chronic: Low  Imminent: Low    Assessment     Coty Doyle has a past psychiatry history of depression and anxiety who has been referred for medication management from primary care provider.  No previous psychiatric inpatient hospitalizations.  Denies history of suicidal ideation.  Denies history of suicide attempts.  Denies history of self harming behaviors.  No overt concerns regarding chemical health history.  Did experience difficult childhood.     Coty Doyle reports she continues to do relatively well. Mood and anxiety more contained. Sleep routine. Tolerating medication well. Will continue current regimen.     Pharmacologic:   11/23/24: + sertraline 100mg     -Continue sertraline 100 mg by mouth every bedtime        Psychosocial: Would benefit from individual therapy with focus of Dialectical Behavior Therapy (DBT). DBT would be helpful in addressing emotional regulation, distress tolerance, mindfulness, and interpersonal effectiveness.   - Has individual therapist. Weekly.          Diagnosis       Generalized anxiety disorder  Major depressive disorder, recurrent, mild     Plan         1) Medications:   -Sertraline 100 mg by mouth every bedtime              MNPMP: I have queried  the [place] and/or [place] Prescription Monitoring Program for this patient for the preceding 12 months, or reviewed the report provided by my proxy delegate. I have not identified any concerns.  2) Risk vs benefits of medications reviewed: Yes  3) Life style modifications: sleep hygiene, exercise, healthy diet  4) Medical concerns:   -Working with endocrinology  5) Others   -Continue individual therapy  6) Refrain from drinking alcohol and/or use of drugs.   7) Please secure all prescription and OTC medications, sharps, and caustic substances. Please remove all firearms and ammunition.  8) Review outside records, get JUSTINO's, coordinate with outside providers  - Obtain verbal JUSTINO for    9) In case of emergency call 911 or go to the nearest ER, this includes patient voicing thought of harming self or others as well as additional safety concerns   10) Follow-up: 12 weeks, or sooner if needed.    Administrative Billing:   Supportive therapy was provided, focusing on reflective listening and solution focused problem solving.    Total time preparing to see this patient, face-to-face time, documenting in the EHR, and coordinating care time on the same calendar date:17minutes    The longitudinal plan of care for the diagnosis(es)/condition(s) as documented were addressed during this visit. Due to the added complexity in care, I will continue to support Coty in the subsequent management and with ongoing continuity of care.      Signed:   PALOMA Cheek CNP on 5/19/2025 at 3:17 PM     Disclaimer: This note consists of symbols derived from keyboarding, dictation and/or voice recognition software. As a result, there may be errors in the script that have gone undetected. Please consider this when interpreting information found in this chart.

## 2025-05-23 ENCOUNTER — MYC MEDICAL ADVICE (OUTPATIENT)
Dept: PSYCHIATRY | Facility: CLINIC | Age: 40
End: 2025-05-23
Payer: COMMERCIAL

## 2025-05-29 ENCOUNTER — E-VISIT (OUTPATIENT)
Dept: OBGYN | Facility: CLINIC | Age: 40
End: 2025-05-29
Payer: COMMERCIAL

## 2025-05-29 DIAGNOSIS — N95.1 MENOPAUSAL SYNDROME (HOT FLASHES): Primary | ICD-10-CM

## 2025-06-03 ENCOUNTER — RESULTS FOLLOW-UP (OUTPATIENT)
Dept: OBGYN | Facility: CLINIC | Age: 40
End: 2025-06-03

## 2025-07-12 ENCOUNTER — MYC MEDICAL ADVICE (OUTPATIENT)
Dept: OBGYN | Facility: CLINIC | Age: 40
End: 2025-07-12
Payer: COMMERCIAL

## 2025-07-15 ENCOUNTER — OFFICE VISIT (OUTPATIENT)
Dept: URGENT CARE | Facility: URGENT CARE | Age: 40
End: 2025-07-15
Payer: COMMERCIAL

## 2025-07-15 ENCOUNTER — ANCILLARY PROCEDURE (OUTPATIENT)
Dept: GENERAL RADIOLOGY | Facility: CLINIC | Age: 40
End: 2025-07-15
Attending: FAMILY MEDICINE
Payer: COMMERCIAL

## 2025-07-15 VITALS
HEART RATE: 100 BPM | WEIGHT: 257 LBS | SYSTOLIC BLOOD PRESSURE: 115 MMHG | BODY MASS INDEX: 35.84 KG/M2 | DIASTOLIC BLOOD PRESSURE: 80 MMHG | OXYGEN SATURATION: 96 % | TEMPERATURE: 99.1 F | RESPIRATION RATE: 16 BRPM

## 2025-07-15 DIAGNOSIS — H92.03 OTALGIA, BILATERAL: Primary | ICD-10-CM

## 2025-07-15 DIAGNOSIS — R05.3 PERSISTENT COUGH: ICD-10-CM

## 2025-07-15 DIAGNOSIS — H93.8X3 EAR CONGESTION, BILATERAL: ICD-10-CM

## 2025-07-15 PROCEDURE — 71046 X-RAY EXAM CHEST 2 VIEWS: CPT | Mod: TC | Performed by: RADIOLOGY

## 2025-07-15 PROCEDURE — 99214 OFFICE O/P EST MOD 30 MIN: CPT | Performed by: FAMILY MEDICINE

## 2025-07-15 PROCEDURE — 3079F DIAST BP 80-89 MM HG: CPT | Performed by: FAMILY MEDICINE

## 2025-07-15 PROCEDURE — 3074F SYST BP LT 130 MM HG: CPT | Performed by: FAMILY MEDICINE

## 2025-07-15 RX ORDER — BENZONATATE 100 MG/1
100 CAPSULE ORAL 3 TIMES DAILY PRN
Qty: 30 CAPSULE | Refills: 0 | Status: SHIPPED | OUTPATIENT
Start: 2025-07-15

## 2025-07-15 NOTE — PATIENT INSTRUCTIONS
Follow-up if symptoms do not get better.  Continue on the antiallergy with a decongestant.  Continue on the Tessalon Perles if symptoms do not improve in 4 to 5 days to follow-up      Luz Lindquist MD

## 2025-07-15 NOTE — PROGRESS NOTES
Urgent Care Clinic Visit    Chief Complaint   Patient presents with    Otalgia     2 days, both ears               7/15/2025    10:02 AM   Additional Questions   Roomed by josh lou

## 2025-07-15 NOTE — PROGRESS NOTES
Chief Complaint   Patient presents with    Otalgia     2 days, both ears     Coty was seen today for otalgia.    Diagnoses and all orders for this visit:    Otalgia, bilateral    Persistent cough  -     XR Chest 2 Views      D/d  Bronchitis-viral, Pneumonia, Sinusitis, Tonsilitis, Viral pharyngitis, Viral syndrome, and Viral upper respiratory illness    PLAN:  Symptomatic therapy suggested: push fluids, rest, use vaporizer or mist needed , use antihistamine-decongestant of choice as needed, and Return office visit if symptoms persist or worsen. Call or return to clinic prn if these symptoms worsen or fail to improve as anticipated.  Your x-ray results with patient no acute infiltrate noted discussed with patient to watch for any worsening symptoms.    Patient was stable at the time of discharge   Reviewed result with patients in details was educated about the diagnosis and also discussed in details about treatment plans, patient / understood and agreed with the plan    -Discussed return precautions and/or reasons to go to ED, including but not limited to: fevers/chills, worsening sob    Luz Lindquist MD       SUBJECTIVE:   Coty Doyle is a 40 year old female who complains of nasal congestion, post nasal drip, sore throat, and left ear pain and fullness for 5 days. She denies a history of wheezing and shortness of breath and denies a history of asthma. Patient denies smoke cigarettes.   She has been noticing low grade fever       OBJECTIVE:  She appears well, vital signs are as noted by the nurse. Ears congested    Throat and pharynx normal.  Neck supple. No adenopathy in the neck. Nose is congested. Sinuses non tender. The chest is clear, without wheezes or rales.    Luz Lindquist MD

## 2025-07-16 DIAGNOSIS — J18.9 ATYPICAL PNEUMONIA: Primary | ICD-10-CM

## 2025-07-16 RX ORDER — AZITHROMYCIN 250 MG/1
TABLET, FILM COATED ORAL
Qty: 6 TABLET | Refills: 0 | Status: SHIPPED | OUTPATIENT
Start: 2025-07-16 | End: 2025-07-21

## 2025-07-22 ENCOUNTER — NURSE TRIAGE (OUTPATIENT)
Dept: NURSING | Facility: CLINIC | Age: 40
End: 2025-07-22

## 2025-07-23 ENCOUNTER — OFFICE VISIT (OUTPATIENT)
Dept: PEDIATRICS | Facility: CLINIC | Age: 40
End: 2025-07-23
Payer: COMMERCIAL

## 2025-07-23 VITALS
HEIGHT: 71 IN | TEMPERATURE: 96 F | WEIGHT: 259.9 LBS | SYSTOLIC BLOOD PRESSURE: 114 MMHG | BODY MASS INDEX: 36.39 KG/M2 | OXYGEN SATURATION: 97 % | RESPIRATION RATE: 20 BRPM | DIASTOLIC BLOOD PRESSURE: 81 MMHG | HEART RATE: 74 BPM

## 2025-07-23 DIAGNOSIS — J45.31 MILD PERSISTENT ASTHMA WITH EXACERBATION: Primary | ICD-10-CM

## 2025-07-23 DIAGNOSIS — J18.9 PNEUMONIA DUE TO INFECTIOUS ORGANISM, UNSPECIFIED LATERALITY, UNSPECIFIED PART OF LUNG: ICD-10-CM

## 2025-07-23 PROCEDURE — 99214 OFFICE O/P EST MOD 30 MIN: CPT | Performed by: PHYSICIAN ASSISTANT

## 2025-07-23 PROCEDURE — 3079F DIAST BP 80-89 MM HG: CPT | Performed by: PHYSICIAN ASSISTANT

## 2025-07-23 PROCEDURE — 1126F AMNT PAIN NOTED NONE PRSNT: CPT | Performed by: PHYSICIAN ASSISTANT

## 2025-07-23 PROCEDURE — 3074F SYST BP LT 130 MM HG: CPT | Performed by: PHYSICIAN ASSISTANT

## 2025-07-23 RX ORDER — PREDNISONE 20 MG/1
20 TABLET ORAL DAILY
Qty: 10 TABLET | Refills: 0 | Status: SHIPPED | OUTPATIENT
Start: 2025-07-23

## 2025-07-23 ASSESSMENT — PAIN SCALES - GENERAL: PAINLEVEL_OUTOF10: NO PAIN (0)

## 2025-07-23 ASSESSMENT — PATIENT HEALTH QUESTIONNAIRE - PHQ9
10. IF YOU CHECKED OFF ANY PROBLEMS, HOW DIFFICULT HAVE THESE PROBLEMS MADE IT FOR YOU TO DO YOUR WORK, TAKE CARE OF THINGS AT HOME, OR GET ALONG WITH OTHER PEOPLE: SOMEWHAT DIFFICULT
SUM OF ALL RESPONSES TO PHQ QUESTIONS 1-9: 5
SUM OF ALL RESPONSES TO PHQ QUESTIONS 1-9: 5

## 2025-07-23 NOTE — PROGRESS NOTES
"  Assessment & Plan     Mild persistent asthma with exacerbation  Begin oral prednisone. Begin symbicort 2 puffs twice daily and PRN  - predniSONE (DELTASONE) 20 MG tablet; Take 1 tablet (20 mg) by mouth daily.    Pneumonia due to infectious organism, unspecified laterality, unspecified part of lung  improving    Subjective   Coty is a 40 year old, presenting for the following health issues:  Vomiting    History of Present Illness       Reason for visit:  Vomitted last niggt  Symptom onset:  1-3 days ago  Symptoms include:  Vomiting  Symptom intensity:  Moderate  Symptom progression:  Improving  Had these symptoms before:  No  What makes it worse:  Standing up quickly  What makes it better:  Water saltines   She is taking medications regularly.      Acute Illness  Acute illness concerns: vomiting   Onset/Duration: had an episode last night after 30 minutes of taking Zoloft due to coughing fit and retook after vomiting   Symptoms:  Fever: No  Chills/Sweats: YES- chills and night sweats   Headache (location?): YES- last night   Sinus Pressure: No  Conjunctivitis:  No  Ear Pain: YES- left ear   Rhinorrhea: No  Congestion: YES  Sore Throat: YES  Cough: YES-productive of clear sputum- had pneumonia   Wheeze: YES- with pneumonia   Decreased Appetite: YES  Nausea: No  Vomiting: YES- one episode of vomiting   Diarrhea: No  Dysuria/Freq.: No  Dysuria or Hematuria: No  Fatigue/Achiness: YES  Sick/Strep Exposure: YES- son is sick, with  HMPV  Therapies tried and outcome: recent antibiotic use-zpak and inhaler- completed course last week.   Review of Systems  Constitutional, HEENT, cardiovascular, pulmonary, gi and gu systems are negative, except as otherwise noted.      Objective    /81 (BP Location: Right arm, Patient Position: Sitting, Cuff Size: Adult Large)   Pulse 74   Temp (!) 96  F (35.6  C) (Temporal)   Resp 20   Ht 1.803 m (5' 11\")   Wt 117.9 kg (259 lb 14.4 oz)   LMP 06/24/2025   SpO2 97%   BMI 36.25 " kg/m    Body mass index is 36.25 kg/m .  Physical Exam   GENERAL: alert and no distress  EYES: Eyes grossly normal to inspection, PERRL and conjunctivae and sclerae normal  HENT: ear canals and TM's normal, nose and mouth without ulcers or lesions  NECK: no adenopathy  RESP: diminished breath sounds throughout with wheezing and rhonchi present  CV: regular rate and rhythm, normal S1 S2, no S3 or S4, no murmur  No results found for any visits on 07/23/25.        Signed Electronically by: Tyler Glaser PA-C

## 2025-07-23 NOTE — TELEPHONE ENCOUNTER
Patient calling reports vomiting once following taking her medication. Reports taking the medication 30 minutes prior to her vomiting episode. Advised per protocol to repeat dose if within 60 minutes of taking her medication with patient agreeable. Patient reports vomiting once while coughing. Denies difficulty breathing, coughing up blood and shock. Advised to see a provider within 24 hours with patient agreeable to the plan.     Yessi Martin RN 07/22/25 7:54 PM   [facility] Triage Nurse Advisor        Reason for Disposition   Caller has medicine question only, adult not sick, AND triager answers question   Vomits prescription medicine once and doesn't mind the taste   Vomiting occurs only while coughing   SEVERE coughing spells (e.g., whooping sound after coughing, vomiting after coughing)    Additional Information   Negative: [1] Intentional drug overdose AND [2] suicidal thoughts or ideas   Negative: Drug overdose and triager unable to answer question   Negative: Caller requesting a renewal or refill of a medicine patient is currently taking   Negative: Caller requesting information unrelated to medicine   Negative: Caller requesting information about COVID-19 Vaccine   Negative: Caller requesting information about Emergency Contraception   Negative: Caller requesting information about Combined Birth Control Pills   Negative: Caller requesting information about Progestin Birth Control Pills   Negative: Caller requesting information about Post-Op pain or medicines   Negative: Caller requesting a prescription antibiotic (such as Penicillin) for Strep throat and has a positive culture result   Negative: Caller requesting a prescription anti-viral med (such as Tamiflu) and has influenza (flu) symptoms   Negative: Immunization reaction suspected   Negative: Rash while taking a medicine or within 3 days of stopping it   Negative: [1] Asthma and [2] having symptoms of asthma (cough, wheezing, etc.)   Negative: [1]  Symptom of illness (e.g., headache, abdominal pain, earache, vomiting) AND [2] more than mild   Negative: Breastfeeding questions about mother's medicines and diet   Negative: Shock suspected (e.g., cold/pale/clammy skin, too weak to stand, low BP, rapid pulse)   Negative: Difficult to awaken or acting confused (e.g., disoriented, slurred speech)   Negative: Sounds like a life-threatening emergency to the triager   Negative: SEVERE difficulty breathing (e.g., struggling for each breath, speaks in single words)   Negative: Bluish (or gray) lips or face now   Negative: [1] Rapid onset of cough AND [2] has hives   Negative: Coughing started suddenly after medicine, an allergic food or bee sting   Negative: [1] Difficulty breathing AND [2] exposure to flames, smoke, or fumes   Negative: [1] Stridor AND [2] difficulty breathing   Negative: Sounds like a life-threatening emergency to the triager   Negative: Choked on object of food that could be caught in the throat   Negative: Chest pain is main symptom   Negative: [1] Previous asthma attacks AND [2] this feels like asthma attack   Negative: Cough lasts > 3 weeks   Negative: Wet cough (productive; white-yellow, yellow, green, or blair colored sputum)   Negative: [1] Dry cough (non-productive;  no sputum or minimal clear sputum) AND [2] within 14 days of COVID-19 Exposure   Negative: [1] MODERATE difficulty breathing (e.g., speaks in phrases, SOB even at rest, pulse 100-120) AND [2] still present when not coughing   Negative: Chest pain  (Exception: MILD central chest pain, present only when coughing.)   Negative: Patient sounds very sick or weak to the triager   Negative: [1] MILD difficulty breathing (e.g., minimal/no SOB at rest, SOB with walking, pulse <100) AND [2] still present when not coughing   Negative: [1] Coughed up blood AND [2] > 1 tablespoon (15 ml)   (Exception: Blood-tinged sputum.)   Negative: Fever > 103 F (39.4 C)   Negative: [1] Fever > 100.0 F (37.8  C) AND [2] diabetes mellitus or weak immune system (e.g., HIV positive, cancer chemo, splenectomy, organ transplant, chronic steroids)   Negative: [1] Fever > 101 F (38.3 C) AND [2] age > 60 years   Negative: [1] Fever > 100.0 F (37.8 C) AND [2] bedridden (e.g., CVA, chronic illness, recovering from surgery)   Negative: Wheezing is present   Negative: [1] Ankle swelling AND [2] swelling is increasing    Protocols used: Medication Question Call-A-AH, Vomiting on Meds-P-AH, Vomiting-A-AH, Cough - Acute Non-Productive-A-AH

## 2025-07-23 NOTE — PATIENT INSTRUCTIONS
Begin oral steroids 2 tabs in the AM with food daily x 5 days  Symbicort 2 puffs twice daily and then use as needed 2 puffs   
None

## 2025-08-13 ENCOUNTER — OFFICE VISIT (OUTPATIENT)
Dept: PEDIATRICS | Facility: CLINIC | Age: 40
End: 2025-08-13
Payer: COMMERCIAL

## 2025-08-13 ENCOUNTER — ANCILLARY PROCEDURE (OUTPATIENT)
Dept: GENERAL RADIOLOGY | Facility: CLINIC | Age: 40
End: 2025-08-13
Attending: PHYSICIAN ASSISTANT
Payer: COMMERCIAL

## 2025-08-13 VITALS
HEART RATE: 99 BPM | SYSTOLIC BLOOD PRESSURE: 108 MMHG | OXYGEN SATURATION: 99 % | HEIGHT: 71 IN | RESPIRATION RATE: 18 BRPM | BODY MASS INDEX: 36.96 KG/M2 | DIASTOLIC BLOOD PRESSURE: 62 MMHG | WEIGHT: 264 LBS | TEMPERATURE: 96.6 F

## 2025-08-13 DIAGNOSIS — J18.9 PNEUMONIA DUE TO INFECTIOUS ORGANISM, UNSPECIFIED LATERALITY, UNSPECIFIED PART OF LUNG: Primary | ICD-10-CM

## 2025-08-13 DIAGNOSIS — J18.9 PNEUMONIA DUE TO INFECTIOUS ORGANISM, UNSPECIFIED LATERALITY, UNSPECIFIED PART OF LUNG: ICD-10-CM

## 2025-08-13 PROCEDURE — 3074F SYST BP LT 130 MM HG: CPT | Performed by: PHYSICIAN ASSISTANT

## 2025-08-13 PROCEDURE — 1126F AMNT PAIN NOTED NONE PRSNT: CPT | Performed by: PHYSICIAN ASSISTANT

## 2025-08-13 PROCEDURE — 99213 OFFICE O/P EST LOW 20 MIN: CPT | Performed by: PHYSICIAN ASSISTANT

## 2025-08-13 PROCEDURE — 71046 X-RAY EXAM CHEST 2 VIEWS: CPT | Mod: TC | Performed by: RADIOLOGY

## 2025-08-13 PROCEDURE — 3078F DIAST BP <80 MM HG: CPT | Performed by: PHYSICIAN ASSISTANT

## 2025-08-13 ASSESSMENT — PAIN SCALES - GENERAL: PAINLEVEL_OUTOF10: NO PAIN (0)

## 2025-08-19 ENCOUNTER — VIRTUAL VISIT (OUTPATIENT)
Dept: PSYCHIATRY | Facility: CLINIC | Age: 40
End: 2025-08-19
Payer: COMMERCIAL

## 2025-08-19 DIAGNOSIS — F33.41 MAJOR DEPRESSIVE DISORDER, RECURRENT EPISODE, IN PARTIAL REMISSION: ICD-10-CM

## 2025-08-19 DIAGNOSIS — F41.1 GAD (GENERALIZED ANXIETY DISORDER): ICD-10-CM

## 2025-08-19 PROCEDURE — 99207 PR NO CHARGE LOS: CPT | Mod: 95 | Performed by: NURSE PRACTITIONER

## 2025-08-19 PROCEDURE — 1126F AMNT PAIN NOTED NONE PRSNT: CPT | Mod: 95 | Performed by: NURSE PRACTITIONER

## 2025-08-19 RX ORDER — SERTRALINE HYDROCHLORIDE 100 MG/1
100 TABLET, FILM COATED ORAL DAILY
Qty: 90 TABLET | Refills: 1 | Status: SHIPPED | OUTPATIENT
Start: 2025-08-19

## 2025-08-19 ASSESSMENT — ANXIETY QUESTIONNAIRES
4. TROUBLE RELAXING: MORE THAN HALF THE DAYS
IF YOU CHECKED OFF ANY PROBLEMS ON THIS QUESTIONNAIRE, HOW DIFFICULT HAVE THESE PROBLEMS MADE IT FOR YOU TO DO YOUR WORK, TAKE CARE OF THINGS AT HOME, OR GET ALONG WITH OTHER PEOPLE: SOMEWHAT DIFFICULT
5. BEING SO RESTLESS THAT IT IS HARD TO SIT STILL: MORE THAN HALF THE DAYS
7. FEELING AFRAID AS IF SOMETHING AWFUL MIGHT HAPPEN: NOT AT ALL
6. BECOMING EASILY ANNOYED OR IRRITABLE: NOT AT ALL
GAD7 TOTAL SCORE: 10
GAD7 TOTAL SCORE: 10
3. WORRYING TOO MUCH ABOUT DIFFERENT THINGS: MORE THAN HALF THE DAYS
2. NOT BEING ABLE TO STOP OR CONTROL WORRYING: MORE THAN HALF THE DAYS
1. FEELING NERVOUS, ANXIOUS, OR ON EDGE: MORE THAN HALF THE DAYS
7. FEELING AFRAID AS IF SOMETHING AWFUL MIGHT HAPPEN: NOT AT ALL
GAD7 TOTAL SCORE: 10
8. IF YOU CHECKED OFF ANY PROBLEMS, HOW DIFFICULT HAVE THESE MADE IT FOR YOU TO DO YOUR WORK, TAKE CARE OF THINGS AT HOME, OR GET ALONG WITH OTHER PEOPLE?: SOMEWHAT DIFFICULT

## 2025-08-19 ASSESSMENT — PAIN SCALES - GENERAL: PAINLEVEL_OUTOF10: NO PAIN (0)

## 2025-08-27 ENCOUNTER — MYC MEDICAL ADVICE (OUTPATIENT)
Dept: PSYCHIATRY | Facility: CLINIC | Age: 40
End: 2025-08-27
Payer: COMMERCIAL